# Patient Record
Sex: FEMALE | Race: WHITE | NOT HISPANIC OR LATINO | Employment: OTHER | URBAN - METROPOLITAN AREA
[De-identification: names, ages, dates, MRNs, and addresses within clinical notes are randomized per-mention and may not be internally consistent; named-entity substitution may affect disease eponyms.]

---

## 2017-01-01 ENCOUNTER — PATIENT MESSAGE (OUTPATIENT)
Dept: RHEUMATOLOGY | Facility: CLINIC | Age: 52
End: 2017-01-01

## 2017-01-01 DIAGNOSIS — M06.09 RHEUMATOID ARTHRITIS OF MULTIPLE SITES WITH NEGATIVE RHEUMATOID FACTOR: ICD-10-CM

## 2017-01-02 DIAGNOSIS — M06.00 SERONEGATIVE RHEUMATOID ARTHRITIS: ICD-10-CM

## 2017-01-02 DIAGNOSIS — M54.32 SCIATICA OF LEFT SIDE: ICD-10-CM

## 2017-01-02 DIAGNOSIS — M79.7 FIBROMYALGIA: Chronic | ICD-10-CM

## 2017-01-02 DIAGNOSIS — R51.9 GENERALIZED HEADACHES: ICD-10-CM

## 2017-01-02 RX ORDER — HYDROXYCHLOROQUINE SULFATE 200 MG/1
TABLET, FILM COATED ORAL
Qty: 60 TABLET | Refills: 0 | Status: SHIPPED | OUTPATIENT
Start: 2017-01-02 | End: 2017-02-13 | Stop reason: SDUPTHER

## 2017-01-02 RX ORDER — METHOCARBAMOL 750 MG/1
TABLET, FILM COATED ORAL
Qty: 60 TABLET | Refills: 0 | Status: SHIPPED | OUTPATIENT
Start: 2017-01-02 | End: 2017-01-09 | Stop reason: SDUPTHER

## 2017-01-02 RX ORDER — MELOXICAM 7.5 MG/1
TABLET ORAL
Qty: 180 TABLET | Refills: 0 | Status: SHIPPED | OUTPATIENT
Start: 2017-01-02 | End: 2017-03-22 | Stop reason: SDUPTHER

## 2017-01-03 DIAGNOSIS — M79.7 FIBROMYALGIA: Chronic | ICD-10-CM

## 2017-01-03 DIAGNOSIS — M06.00 SERONEGATIVE RHEUMATOID ARTHRITIS: ICD-10-CM

## 2017-01-03 DIAGNOSIS — M54.32 SCIATICA OF LEFT SIDE: ICD-10-CM

## 2017-01-03 DIAGNOSIS — R51.9 GENERALIZED HEADACHES: ICD-10-CM

## 2017-01-03 RX ORDER — HYDROCODONE BITARTRATE AND ACETAMINOPHEN 5; 325 MG/1; MG/1
TABLET ORAL
Qty: 105 TABLET | Refills: 0 | OUTPATIENT
Start: 2017-01-03

## 2017-01-03 RX ORDER — HYDROCODONE BITARTRATE AND ACETAMINOPHEN 5; 325 MG/1; MG/1
TABLET ORAL
Qty: 105 TABLET | Refills: 0 | Status: SHIPPED | OUTPATIENT
Start: 2017-01-16 | End: 2017-02-15 | Stop reason: SDUPTHER

## 2017-01-03 RX ORDER — NORTRIPTYLINE HYDROCHLORIDE 75 MG/1
CAPSULE ORAL
Qty: 60 CAPSULE | Refills: 0 | Status: SHIPPED | OUTPATIENT
Start: 2017-01-03 | End: 2017-01-29 | Stop reason: SDUPTHER

## 2017-01-05 ENCOUNTER — TELEPHONE (OUTPATIENT)
Dept: OPHTHALMOLOGY | Facility: CLINIC | Age: 52
End: 2017-01-05

## 2017-01-05 DIAGNOSIS — Z79.899 LONG-TERM USE OF PLAQUENIL: Primary | ICD-10-CM

## 2017-01-05 NOTE — TELEPHONE ENCOUNTER
Spoke with pt who stated she sort of forgot appt today something came up can come in on Monday for test and exam.  R/S HVF/OCT and exam on 1/9/17 @ 10:00 and 10: 30.

## 2017-01-07 ENCOUNTER — PATIENT MESSAGE (OUTPATIENT)
Dept: RHEUMATOLOGY | Facility: CLINIC | Age: 52
End: 2017-01-07

## 2017-01-09 ENCOUNTER — PATIENT MESSAGE (OUTPATIENT)
Dept: RHEUMATOLOGY | Facility: CLINIC | Age: 52
End: 2017-01-09

## 2017-01-09 ENCOUNTER — CLINICAL SUPPORT (OUTPATIENT)
Dept: OPHTHALMOLOGY | Facility: CLINIC | Age: 52
End: 2017-01-09
Payer: MEDICAID

## 2017-01-09 ENCOUNTER — INITIAL CONSULT (OUTPATIENT)
Dept: OPTOMETRY | Facility: CLINIC | Age: 52
End: 2017-01-09
Payer: MEDICAID

## 2017-01-09 DIAGNOSIS — Z79.899 LONG-TERM USE OF PLAQUENIL: Primary | ICD-10-CM

## 2017-01-09 DIAGNOSIS — M06.00 SERONEGATIVE RHEUMATOID ARTHRITIS: ICD-10-CM

## 2017-01-09 DIAGNOSIS — Z79.899 HIGH RISK MEDICATION USE: Chronic | ICD-10-CM

## 2017-01-09 DIAGNOSIS — H52.203 ASTIGMATISM, BILATERAL: ICD-10-CM

## 2017-01-09 DIAGNOSIS — H52.4 PRESBYOPIA: ICD-10-CM

## 2017-01-09 DIAGNOSIS — D51.8 OTHER VITAMIN B12 DEFICIENCY ANEMIA: Primary | ICD-10-CM

## 2017-01-09 PROCEDURE — 92004 COMPRE OPH EXAM NEW PT 1/>: CPT | Mod: S$PBB,,, | Performed by: OPTOMETRIST

## 2017-01-09 PROCEDURE — 99999 PR PBB SHADOW E&M-EST. PATIENT-LVL II: CPT | Mod: PBBFAC,,, | Performed by: OPTOMETRIST

## 2017-01-09 PROCEDURE — 92134 CPTRZ OPH DX IMG PST SGM RTA: CPT | Mod: PBBFAC | Performed by: OPTOMETRIST

## 2017-01-09 PROCEDURE — 92083 EXTENDED VISUAL FIELD XM: CPT | Mod: PBBFAC | Performed by: OPTOMETRIST

## 2017-01-09 PROCEDURE — 92015 DETERMINE REFRACTIVE STATE: CPT | Mod: ,,, | Performed by: OPTOMETRIST

## 2017-01-09 PROCEDURE — 99212 OFFICE O/P EST SF 10 MIN: CPT | Mod: PBBFAC | Performed by: OPTOMETRIST

## 2017-01-09 NOTE — PROGRESS NOTES
Rel/fix/coop. Good -[facility]  Assessment /Plan     For exam results, see Encounter Report.    There are no diagnoses linked to this encounter.

## 2017-01-09 NOTE — LETTER
January 13, 2017      Andi Dhillon MD  [address]  [facility] [address]           [facility]  [address]  [facility] [address]  Phone: [phone]  Fax: [phone]          Patient: Angela Carlson   MR Number: [id number]   YOB: 1965   Date of Visit: 1/9/2017       Dear Dr. Andi Dhillon:    Thank you for referring Angela Carlson to me for evaluation. Attached you will find relevant portions of my assessment and plan of care.    If you have questions, please do not hesitate to call me. I look forward to following Angela Carlson along with you.    Sincerely,    Dianna Valdivia, OD    Enclosure  CC:  No Recipients    If you would like to receive this communication electronically, please contact [email or web address] or [phone] to request more information on [employer] access.    For providers and/or their staff who would like to refer a patient to [facility], please contact us through our one-stop-shop provider referral line, [facility], at [phone].    If you feel you have received this communication in error or would no longer like to receive these types of communications, please e-mail [email or web address]

## 2017-01-10 RX ORDER — CYANOCOBALAMIN 1000 UG/ML
INJECTION, SOLUTION INTRAMUSCULAR; SUBCUTANEOUS
Qty: 12 ML | Refills: 0 | Status: SHIPPED | OUTPATIENT
Start: 2017-01-10 | End: 2017-03-17 | Stop reason: SDUPTHER

## 2017-01-13 NOTE — PROGRESS NOTES
HPI     Patient's age: 51 y.o.    Approximate date of last eye examination:   3 mos ago  Name of last eye doctor seen: [facility]      Pt states that medical doctor sent her here to get eyes checked due to   medication that she' on. Also got glasses about 3 mos ago have to hold   head up to read.      Wears glasses? yes     Wears CLs?:  no            Headaches?  no  Eye pain/discomfort?  no                                                                                     Flashes?  no  Floaters?  See little silver stars in vision.  Diplopia/Double vision?  Yes,  3 to 4rtimes a day    Patient's Ocular History:         Any eye surgeries? no         Family history of eye disease?  no    Significant patient medical history:         1. Diabetes?  no       If yes, IDDM or NIDDM? no   2. HBP?  no              3. Other (describe):  Rhematoid Arthritis, Fibromyalgia     ! OTC eyedrops currently using:  none   ! Prescription eye meds currently using:  none           Last edited by Maggie Griffin MA on 1/9/2017 11:45 AM.         Assessment /Plan     For exam results, see Encounter Report.  Seronegative rheumatoid arthritis  High risk medication use  Long-term use of Plaquenil  -     Posterior Segment OCT Retina-WNL OU  -     Davis Visual Field - OU - Extended - WNLOU      Presbyopia  Astigmatism, bilateral   Rx specs    RTC 1 year, DFE, OCT, HVF 10-2

## 2017-01-19 RX ORDER — HYDROXYZINE HYDROCHLORIDE 50 MG/1
TABLET, FILM COATED ORAL
Qty: 60 TABLET | Refills: 0 | Status: SHIPPED | OUTPATIENT
Start: 2017-01-19 | End: 2017-02-16 | Stop reason: SDUPTHER

## 2017-01-27 ENCOUNTER — PATIENT MESSAGE (OUTPATIENT)
Dept: RHEUMATOLOGY | Facility: CLINIC | Age: 52
End: 2017-01-27

## 2017-01-27 ENCOUNTER — PATIENT MESSAGE (OUTPATIENT)
Dept: NEUROLOGY | Facility: CLINIC | Age: 52
End: 2017-01-27

## 2017-01-30 RX ORDER — NORTRIPTYLINE HYDROCHLORIDE 75 MG/1
CAPSULE ORAL
Qty: 60 CAPSULE | Refills: 3 | Status: SHIPPED | OUTPATIENT
Start: 2017-01-30 | End: 2017-05-02 | Stop reason: SDUPTHER

## 2017-01-31 ENCOUNTER — PATIENT MESSAGE (OUTPATIENT)
Dept: NEUROLOGY | Facility: CLINIC | Age: 52
End: 2017-01-31

## 2017-01-31 DIAGNOSIS — M79.7 FIBROMYALGIA: Primary | Chronic | ICD-10-CM

## 2017-01-31 RX ORDER — METHOCARBAMOL 750 MG/1
TABLET, FILM COATED ORAL
Qty: 60 TABLET | Refills: 2 | Status: SHIPPED | OUTPATIENT
Start: 2017-01-31 | End: 2017-04-21 | Stop reason: SDUPTHER

## 2017-02-12 ENCOUNTER — PATIENT MESSAGE (OUTPATIENT)
Dept: RHEUMATOLOGY | Facility: CLINIC | Age: 52
End: 2017-02-12

## 2017-02-12 ENCOUNTER — PATIENT MESSAGE (OUTPATIENT)
Dept: NEUROLOGY | Facility: CLINIC | Age: 52
End: 2017-02-12

## 2017-02-13 DIAGNOSIS — M06.09 RHEUMATOID ARTHRITIS OF MULTIPLE SITES WITH NEGATIVE RHEUMATOID FACTOR: ICD-10-CM

## 2017-02-13 RX ORDER — PROMETHAZINE HYDROCHLORIDE 25 MG/1
TABLET ORAL
Qty: 15 TABLET | Refills: 0 | Status: SHIPPED | OUTPATIENT
Start: 2017-02-13 | End: 2017-05-02 | Stop reason: SDUPTHER

## 2017-02-13 RX ORDER — HYDROXYCHLOROQUINE SULFATE 200 MG/1
TABLET, FILM COATED ORAL
Qty: 60 TABLET | Refills: 2 | Status: SHIPPED | OUTPATIENT
Start: 2017-02-13 | End: 2017-06-17 | Stop reason: SDUPTHER

## 2017-02-15 DIAGNOSIS — M06.00 SERONEGATIVE RHEUMATOID ARTHRITIS: ICD-10-CM

## 2017-02-15 DIAGNOSIS — R51.9 GENERALIZED HEADACHES: ICD-10-CM

## 2017-02-15 DIAGNOSIS — M79.7 FIBROMYALGIA: Chronic | ICD-10-CM

## 2017-02-15 DIAGNOSIS — M54.32 SCIATICA OF LEFT SIDE: ICD-10-CM

## 2017-02-15 RX ORDER — HYDROCODONE BITARTRATE AND ACETAMINOPHEN 5; 325 MG/1; MG/1
TABLET ORAL
Qty: 105 TABLET | Refills: 0 | Status: SHIPPED | OUTPATIENT
Start: 2017-02-15 | End: 2017-03-06 | Stop reason: SDUPTHER

## 2017-02-16 RX ORDER — HYDROXYZINE HYDROCHLORIDE 50 MG/1
TABLET, FILM COATED ORAL
Qty: 60 TABLET | Refills: 0 | Status: SHIPPED | OUTPATIENT
Start: 2017-02-16 | End: 2017-03-15 | Stop reason: SDUPTHER

## 2017-03-06 ENCOUNTER — PATIENT MESSAGE (OUTPATIENT)
Dept: RHEUMATOLOGY | Facility: CLINIC | Age: 52
End: 2017-03-06

## 2017-03-06 DIAGNOSIS — M06.00 SERONEGATIVE RHEUMATOID ARTHRITIS: ICD-10-CM

## 2017-03-06 DIAGNOSIS — M54.32 SCIATICA OF LEFT SIDE: ICD-10-CM

## 2017-03-06 DIAGNOSIS — M79.7 FIBROMYALGIA: Chronic | ICD-10-CM

## 2017-03-06 DIAGNOSIS — R51.9 GENERALIZED HEADACHES: ICD-10-CM

## 2017-03-06 RX ORDER — HYDROCODONE BITARTRATE AND ACETAMINOPHEN 5; 325 MG/1; MG/1
TABLET ORAL
Qty: 105 TABLET | Refills: 0 | Status: SHIPPED | OUTPATIENT
Start: 2017-03-13 | End: 2017-04-10 | Stop reason: SDUPTHER

## 2017-03-16 RX ORDER — HYDROXYZINE HYDROCHLORIDE 50 MG/1
TABLET, FILM COATED ORAL
Qty: 60 TABLET | Refills: 0 | Status: SHIPPED | OUTPATIENT
Start: 2017-03-16 | End: 2017-04-13 | Stop reason: SDUPTHER

## 2017-03-17 ENCOUNTER — TELEPHONE (OUTPATIENT)
Dept: PHARMACY | Facility: CLINIC | Age: 52
End: 2017-03-17

## 2017-03-17 ENCOUNTER — LAB VISIT (OUTPATIENT)
Dept: LAB | Facility: HOSPITAL | Age: 52
End: 2017-03-17
Attending: INTERNAL MEDICINE
Payer: MEDICAID

## 2017-03-17 ENCOUNTER — OFFICE VISIT (OUTPATIENT)
Dept: RHEUMATOLOGY | Facility: CLINIC | Age: 52
End: 2017-03-17
Payer: MEDICAID

## 2017-03-17 VITALS
HEART RATE: 104 BPM | WEIGHT: 190.69 LBS | DIASTOLIC BLOOD PRESSURE: 98 MMHG | BODY MASS INDEX: 27.3 KG/M2 | SYSTOLIC BLOOD PRESSURE: 159 MMHG | HEIGHT: 70 IN

## 2017-03-17 DIAGNOSIS — M06.00 SERONEGATIVE RHEUMATOID ARTHRITIS: ICD-10-CM

## 2017-03-17 DIAGNOSIS — M06.09 RHEUMATOID ARTHRITIS OF MULTIPLE SITES WITH NEGATIVE RHEUMATOID FACTOR: Primary | ICD-10-CM

## 2017-03-17 DIAGNOSIS — Z79.899 HIGH RISK MEDICATION USE: ICD-10-CM

## 2017-03-17 LAB
ALBUMIN SERPL BCP-MCNC: 4.1 G/DL
ALP SERPL-CCNC: 103 U/L
ALT SERPL W/O P-5'-P-CCNC: 16 U/L
ANION GAP SERPL CALC-SCNC: 8 MMOL/L
AST SERPL-CCNC: 15 U/L
BASOPHILS # BLD AUTO: 0 K/UL
BASOPHILS NFR BLD: 0 %
BILIRUB SERPL-MCNC: 0.5 MG/DL
BUN SERPL-MCNC: 15 MG/DL
CALCIUM SERPL-MCNC: 10.2 MG/DL
CHLORIDE SERPL-SCNC: 104 MMOL/L
CO2 SERPL-SCNC: 29 MMOL/L
CREAT SERPL-MCNC: 0.9 MG/DL
CRP SERPL-MCNC: 2.5 MG/L
DIFFERENTIAL METHOD: NORMAL
EOSINOPHIL # BLD AUTO: 0 K/UL
EOSINOPHIL NFR BLD: 0 %
ERYTHROCYTE [DISTWIDTH] IN BLOOD BY AUTOMATED COUNT: 13.4 %
ERYTHROCYTE [SEDIMENTATION RATE] IN BLOOD BY WESTERGREN METHOD: 5 MM/HR
EST. GFR  (AFRICAN AMERICAN): >60 ML/MIN/1.73 M^2
EST. GFR  (NON AFRICAN AMERICAN): >60 ML/MIN/1.73 M^2
GLUCOSE SERPL-MCNC: 88 MG/DL
HCT VFR BLD AUTO: 44.2 %
HGB BLD-MCNC: 14.5 G/DL
LYMPHOCYTES # BLD AUTO: 1.2 K/UL
LYMPHOCYTES NFR BLD: 30.1 %
MCH RBC QN AUTO: 27.1 PG
MCHC RBC AUTO-ENTMCNC: 32.8 %
MCV RBC AUTO: 83 FL
MONOCYTES # BLD AUTO: 0.3 K/UL
MONOCYTES NFR BLD: 6.4 %
NEUTROPHILS # BLD AUTO: 2.6 K/UL
NEUTROPHILS NFR BLD: 63.3 %
PLATELET # BLD AUTO: 222 K/UL
PMV BLD AUTO: 9.9 FL
POTASSIUM SERPL-SCNC: 3.8 MMOL/L
PROT SERPL-MCNC: 7.6 G/DL
RBC # BLD AUTO: 5.36 M/UL
SODIUM SERPL-SCNC: 141 MMOL/L
WBC # BLD AUTO: 4.05 K/UL

## 2017-03-17 PROCEDURE — 99213 OFFICE O/P EST LOW 20 MIN: CPT | Mod: PBBFAC | Performed by: INTERNAL MEDICINE

## 2017-03-17 PROCEDURE — 99214 OFFICE O/P EST MOD 30 MIN: CPT | Mod: S$PBB,,, | Performed by: INTERNAL MEDICINE

## 2017-03-17 PROCEDURE — 99999 PR PBB SHADOW E&M-EST. PATIENT-LVL III: CPT | Mod: PBBFAC,,, | Performed by: INTERNAL MEDICINE

## 2017-03-17 ASSESSMENT — ROUTINE ASSESSMENT OF PATIENT INDEX DATA (RAPID3)
AM STIFFNESS SCORE: 1, YES
PSYCHOLOGICAL DISTRESS SCORE: 4.4
MDHAQ FUNCTION SCORE: .8
FATIGUE SCORE: 8
PAIN SCORE: 7
TOTAL RAPID3 SCORE: 5.55
PATIENT GLOBAL ASSESSMENT SCORE: 7
WHEN YOU AWAKENED IN THE MORNING OVER THE LAST WEEK, PLEASE INDICATE THE AMOUNT OF TIME IT TAKES UNTIL YOU ARE AS LIMBER AS YOU WILL BE FOR THE DAY: 45 MINUTES

## 2017-03-17 ASSESSMENT — DISEASE ACTIVITY SCORE (DAS28)
CRP_MG_PER_LITER: 2.5
TOTAL_SCORE_CRP: 4.7
ESR_MM_PER_HR: 5
TENDER_JOINTS_COUNT: 9
GLOBAL_HEALTH_SCORE: 70
TOTAL_SCORE_ESR: 4.41
SWOLLEN_JOINTS_COUNT: 5

## 2017-03-17 NOTE — PROGRESS NOTES
"Subjective:       Patient ID: Angela Carlson is a 51 y.o. female.    Chief Complaint: Disease Management            Associated symptoms include fatigue and headaches. Pertinent negatives include no fever.          F/u seroneg RA, FMS  RA since ~2004; hx MTX and HCQ 2004-5; Enbrel ~2010, stopped when developed neutropenia; sx remitted 2013-15, then recurrent synovitis; HCQ 2015-current     FMS: Dx before 2013; robaxin and doxepin; nortriptyline added 2014; hydrocodone since 2014 after failed tramadol     Current meds:   bid ; went to eye clinic Dr Valdivia Jan 2017  Meloxicam 7.5 mg  Duloxetine 60  Nortriptyline 75  Hydrocodone : has been using 105 over 90 days  Robaxin, helps her; says less hand and wrist pain ; usually takes afternoon or night    Morning stiffness for about 45 minutes  R hand hurts; cannot remove ring; shakes it to feel better  L hand not bad  R hand throbs  Stays busy ; always cleaning or gardening  Family activities with sister's children    Review of Systems   Constitutional: Positive for fatigue and unexpected weight change. Negative for fever.   HENT: Negative for mouth sores.    Respiratory: Negative for cough and shortness of breath.    Cardiovascular: Negative for chest pain.   Gastrointestinal: Negative for abdominal pain and diarrhea.   Skin: Negative for rash.   Neurological: Positive for headaches.   Psychiatric/Behavioral: Negative for sleep disturbance.         Objective:     BP (!) 159/98  Pulse 104  Ht 5' 9.5" (1.765 m)  Wt 86.5 kg (190 lb 11.2 oz)  BMI 27.76 kg/m2     Physical Exam   Constitutional: She is well-developed, well-nourished, and in no distress.   HENT:   Mouth/Throat: Oropharynx is clear and moist.   Eyes: Conjunctivae are normal.   Cardiovascular: Normal rate, regular rhythm and normal heart sounds.        Right Side Rheumatological Exam     The patient is tender to palpation of the 1st MCP, 2nd PIP, 2nd MCP, 3rd PIP, 3rd MCP, 4th PIP and 5th PIP    She " has swelling of the 1st MCP, 2nd MCP and 3rd MCP    Left Side Rheumatological Exam     The patient is tender to palpation of the wrist, 2nd MCP and 3rd MCP.    She has swelling of the wrist, 2nd MCP and 3rd MCP      Lymphadenopathy:     She has no cervical adenopathy.   Neurological: She is alert.   Skin: No rash noted.         Today:  Nl CBC, ESR 5, CMP nl, CRP 2.5    Assessment:       1. Rheumatoid arthritis of multiple sites with negative rheumatoid factor    2. High risk medication use        Seronegative RA with increased swollen and tender joints  While on hydroxychloroquine; previously failed mtx and previously improved on Enbrel    DAS28 is 4.41 (YAN-crp 4.7)    Chronic hydrocodone    Plan:     1. Schedule Quantiferon Gold next week  2. Start Enbrel after Quantiferon gold TB test; Rx sent to [facility]  3. Cont HCQ until starts Enbrel  4. RTC 3 mo with lab to assess response to Enbrel

## 2017-03-17 NOTE — MR AVS SNAPSHOT
[facility]  [address]  [place] [address]  Phone: [phone]  Fax: [phone]                  Angela Carlson   3/17/2017 10:30 AM   Office Visit    Description:  Female : 1965   Provider:  Andi Dhillon MD   Department:  [facility]           Reason for Visit     Disease Management           Diagnoses this Visit        Comments    Rheumatoid arthritis of multiple sites with negative rheumatoid factor    -  Primary            To Do List           Future Appointments        Provider Department Dept Phone    2017 3:30 PM Chava Montoya MD [facility] [phone]      Goals (5 Years of Data)     None       These Medications        Disp Refills Start End    etanercept (ENBREL SURECLICK) 50 mg/mL (0.98 mL) PnIj 4 Syringe 11 3/17/2017 3/17/2018    Inject 50 mg into the skin once a week. - Subcutaneous    Pharmacy: [facility] [place] [address]  Ph #: [phone]         [facility]     [facility] Nurse Care Line -  Assistance  Registered nurses in the [facility] provide clinical advisement, health education, appointment booking, and other advisory services.  Call for this free service at [phone].             Medications           Message regarding Medications     Verify the changes and/or additions to your medication regime listed below are the same as discussed with your clinician today.  If any of these changes or additions are incorrect, please notify your healthcare provider.        START taking these NEW medications        Refills    etanercept (ENBREL SURECLICK) 50 mg/mL (0.98 mL) PnIj 11    Sig: Inject 50 mg into the skin once a week.    Class: Normal    Route: Subcutaneous           Verify that the below list of medications is an accurate representation of the medications you are currently taking.  If none reported, the list may be blank. If  "incorrect, please contact your healthcare provider. Carry this list with you in case of emergency.           Current Medications     atorvastatin (LIPITOR) 20 MG tablet TK 1 T PO QD    cyanocobalamin 1,000 mcg/mL injection INJECT 1 ML AS DIRECTED 3 TIMES A WEEK    duloxetine (CYMBALTA) 60 MG capsule TAKE 1 CAPSULE BY MOUTH EVERY DAY    hydrocodone-acetaminophen 5-325mg (NORCO) 5-325 mg per tablet Alternate 3 or 4 every other day as needed for pain    hydroxychloroquine (PLAQUENIL) 200 mg tablet TAKE 1 TABLET BY MOUTH TWICE DAILY    hydrOXYzine (ATARAX) 50 MG tablet TAKE 1 TABLET BY MOUTH TWICE DAILY AS NEEDED FOR ITCHING    levothyroxine (SYNTHROID) 150 MCG tablet Take 150 mcg by mouth once daily.    meloxicam (MOBIC) 7.5 MG tablet TAKE 1 TO 2 TABLETS(7.5 TO 15 MG) BY MOUTH DAILY AS NEEDED FOR PAIN OR ARTHRITIS    methocarbamol (ROBAXIN) 750 MG Tab TAKE 1 TABLET BY MOUTH TWICE DAILY AS NEEDED    metoprolol tartrate (LOPRESSOR) 50 MG tablet Take 50 mg by mouth every evening.     nortriptyline (PAMELOR) 75 MG Cap TAKE 1 TO 2 CAPSULES BY MOUTH EVERY NIGHT AT BEDTIME AS NEEDED    promethazine (PHENERGAN) 25 MG tablet TAKE 1 TABLET BY MOUTH EVERY 6 HOURS AS NEEDED FOR NAUSEA OR VOMITING    etanercept (ENBREL SURECLICK) 50 mg/mL (0.98 mL) PnIj Inject 50 mg into the skin once a week.           Clinical Reference Information           Your Vitals Were     BP Pulse Height Weight BMI    159/98 104 5' 9.5" (1.765 m) 86.5 kg (190 lb 11.2 oz) 27.76 kg/m2      Blood Pressure          Most Recent Value    BP  (!)  159/98      Allergies as of 3/17/2017     No Known Allergies      Immunizations Administered on Date of Encounter - 3/17/2017     None      Language Assistance Services     ATTENTION: Language assistance services are available, free of charge. Please call [phone].      ATENCIÓN: Si christal salgado, tiene a fontaine disposición servicios gratuitos de asistencia lingüística. Llame al [phone].     CHÚ Ý: N?u b?n nói " Ti?ng Vi?t, có các d?ch v? h? tr? ngôn ng? mi?n phí dành cho b?n. G?i s? [phone].         [facility] complies with applicable Federal civil rights laws and does not discriminate on the basis of race, color, national origin, age, disability, or sex.

## 2017-03-21 ENCOUNTER — LAB VISIT (OUTPATIENT)
Dept: LAB | Facility: HOSPITAL | Age: 52
End: 2017-03-21
Attending: INTERNAL MEDICINE
Payer: MEDICAID

## 2017-03-21 DIAGNOSIS — Z79.899 HIGH RISK MEDICATION USE: ICD-10-CM

## 2017-03-21 DIAGNOSIS — M06.09 RHEUMATOID ARTHRITIS OF MULTIPLE SITES WITH NEGATIVE RHEUMATOID FACTOR: ICD-10-CM

## 2017-03-21 PROCEDURE — 36415 COLL VENOUS BLD VENIPUNCTURE: CPT

## 2017-03-21 PROCEDURE — 86480 TB TEST CELL IMMUN MEASURE: CPT

## 2017-03-22 DIAGNOSIS — M06.00 SERONEGATIVE RHEUMATOID ARTHRITIS: ICD-10-CM

## 2017-03-22 RX ORDER — MELOXICAM 7.5 MG/1
TABLET ORAL
Qty: 180 TABLET | Refills: 0 | Status: SHIPPED | OUTPATIENT
Start: 2017-03-22 | End: 2017-06-17 | Stop reason: SDUPTHER

## 2017-03-23 LAB
MITOGEN NIL: >10 IU/ML
NIL: 0.09 IU/ML
TB ANTIGEN NIL: -0.02 IU/ML
TB ANTIGEN: 0.07 IU/ML
TB GOLD: NEGATIVE

## 2017-03-27 NOTE — TELEPHONE ENCOUNTER
Dr Dhillon,    Specialty Pharmacy received rx for Enbrel which was submitted for [place] an was denied by her insurance, however, this decision was overturned on the basis of appeal.    PA# [id number]  Approval dates: 3/27/17-9/27/17    Our staff will reach out to the patient to offer consultation/injection training and shipment/ of the medication.      Carlos A [place], Mendoza, BCPS [facility]  [phone]

## 2017-03-28 ENCOUNTER — TELEPHONE (OUTPATIENT)
Dept: PHARMACY | Facility: CLINIC | Age: 52
End: 2017-03-28

## 2017-04-03 ENCOUNTER — TELEPHONE (OUTPATIENT)
Dept: PHARMACY | Facility: CLINIC | Age: 52
End: 2017-04-03

## 2017-04-03 NOTE — TELEPHONE ENCOUNTER
patient contacted to offer consult and setup shipment. She verified she has not started any new medication. She has not developed any new drug allergies or medical conditions. She schedule her ship date for 4-4-17 for a 4-5-17 delivery. She verified her address on file is correct and would like her medication to go there. She is new to pharmacy but existing to drug and has declined consultation with a clinical pharmacist. She verified understanding of the refill process and has no additional questions at the time_ 4-3-17.

## 2017-04-10 ENCOUNTER — PATIENT MESSAGE (OUTPATIENT)
Dept: RHEUMATOLOGY | Facility: CLINIC | Age: 52
End: 2017-04-10

## 2017-04-10 DIAGNOSIS — D51.8 OTHER VITAMIN B12 DEFICIENCY ANEMIA: ICD-10-CM

## 2017-04-10 DIAGNOSIS — M79.7 FIBROMYALGIA: Chronic | ICD-10-CM

## 2017-04-10 DIAGNOSIS — M54.32 SCIATICA OF LEFT SIDE: ICD-10-CM

## 2017-04-10 DIAGNOSIS — M06.00 SERONEGATIVE RHEUMATOID ARTHRITIS: ICD-10-CM

## 2017-04-10 DIAGNOSIS — R51.9 GENERALIZED HEADACHES: ICD-10-CM

## 2017-04-10 RX ORDER — HYDROCODONE BITARTRATE AND ACETAMINOPHEN 5; 325 MG/1; MG/1
TABLET ORAL
Qty: 105 TABLET | Refills: 0 | Status: SHIPPED | OUTPATIENT
Start: 2017-04-10 | End: 2017-05-15 | Stop reason: SDUPTHER

## 2017-04-11 RX ORDER — CYANOCOBALAMIN 1000 UG/ML
INJECTION, SOLUTION INTRAMUSCULAR; SUBCUTANEOUS
Qty: 12 ML | Refills: 0 | Status: SHIPPED | OUTPATIENT
Start: 2017-04-11 | End: 2017-05-02 | Stop reason: SDUPTHER

## 2017-04-13 RX ORDER — HYDROXYZINE HYDROCHLORIDE 50 MG/1
TABLET, FILM COATED ORAL
Qty: 60 TABLET | Refills: 0 | Status: SHIPPED | OUTPATIENT
Start: 2017-04-13 | End: 2017-05-02 | Stop reason: SDUPTHER

## 2017-04-21 DIAGNOSIS — M79.7 FIBROMYALGIA: Chronic | ICD-10-CM

## 2017-04-21 RX ORDER — DULOXETIN HYDROCHLORIDE 60 MG/1
CAPSULE, DELAYED RELEASE ORAL
Qty: 30 CAPSULE | Refills: 2 | Status: SHIPPED | OUTPATIENT
Start: 2017-04-21 | End: 2017-07-19 | Stop reason: SDUPTHER

## 2017-04-21 RX ORDER — METHOCARBAMOL 750 MG/1
TABLET, FILM COATED ORAL
Qty: 60 TABLET | Refills: 2 | Status: SHIPPED | OUTPATIENT
Start: 2017-04-21 | End: 2017-07-13 | Stop reason: SDUPTHER

## 2017-04-25 ENCOUNTER — TELEPHONE (OUTPATIENT)
Dept: PHARMACY | Facility: CLINIC | Age: 52
End: 2017-04-25

## 2017-05-02 ENCOUNTER — OFFICE VISIT (OUTPATIENT)
Dept: NEUROLOGY | Facility: CLINIC | Age: 52
End: 2017-05-02
Payer: MEDICAID

## 2017-05-02 VITALS
HEART RATE: 100 BPM | HEIGHT: 69 IN | WEIGHT: 189.81 LBS | BODY MASS INDEX: 28.11 KG/M2 | RESPIRATION RATE: 16 BRPM | DIASTOLIC BLOOD PRESSURE: 92 MMHG | SYSTOLIC BLOOD PRESSURE: 124 MMHG

## 2017-05-02 DIAGNOSIS — G47.33 OSA (OBSTRUCTIVE SLEEP APNEA): ICD-10-CM

## 2017-05-02 DIAGNOSIS — M79.7 FIBROMYALGIA: ICD-10-CM

## 2017-05-02 DIAGNOSIS — G43.109 MIGRAINE WITH AURA AND WITHOUT STATUS MIGRAINOSUS, NOT INTRACTABLE: Primary | ICD-10-CM

## 2017-05-02 PROCEDURE — 99999 PR PBB SHADOW E&M-EST. PATIENT-LVL III: CPT | Mod: PBBFAC,,, | Performed by: PSYCHIATRY & NEUROLOGY

## 2017-05-02 PROCEDURE — 99214 OFFICE O/P EST MOD 30 MIN: CPT | Mod: S$PBB,,, | Performed by: PSYCHIATRY & NEUROLOGY

## 2017-05-02 PROCEDURE — 99213 OFFICE O/P EST LOW 20 MIN: CPT | Mod: PBBFAC | Performed by: PSYCHIATRY & NEUROLOGY

## 2017-05-02 RX ORDER — HYDROXYZINE HYDROCHLORIDE 50 MG/1
TABLET, FILM COATED ORAL
Qty: 60 TABLET | Refills: 11 | Status: SHIPPED | OUTPATIENT
Start: 2017-05-02 | End: 2018-05-08 | Stop reason: SDUPTHER

## 2017-05-02 RX ORDER — PROMETHAZINE HYDROCHLORIDE 25 MG/1
TABLET ORAL
Qty: 15 TABLET | Refills: 5 | Status: SHIPPED | OUTPATIENT
Start: 2017-05-02 | End: 2017-07-06 | Stop reason: SDUPTHER

## 2017-05-02 RX ORDER — NORTRIPTYLINE HYDROCHLORIDE 75 MG/1
CAPSULE ORAL
Qty: 60 CAPSULE | Refills: 11 | Status: SHIPPED | OUTPATIENT
Start: 2017-05-02 | End: 2018-05-14 | Stop reason: SDUPTHER

## 2017-05-02 NOTE — PROGRESS NOTES
HPI: Angela Carlson is a 51 y.o. female with migraine with aura and with  cervicogenic trigger possibly, although MRI C spine with only minor changes. Some complicated migraine with some alteration of consciousness at times-resolved. Anxiety and depression. She has  TREY.   Symptoms of lumbar radicular pain down the left leg with MRI showing disc bulge and nerve root displacement at S1 on the left 2015.   She reports great control of her headaches since 2/2017.  She states this is because she does not isolate at home any more and stress is reduced.  Her pain is well controlled with a new bed  She is on Enbrel for RA starting today  Her Lumbar radicular pain is worse in episodes. She treats this with rest.   She is using atrax for anxiety/itching related to anxiety a few days per week.  She uses phenergan once or twice per week to help with nausea side effects of other meds  RA is also managed with hydrocodone via Rheumatology  She is no longer Fioricet    Review of Systems   Constitutional: Negative for fever.   HENT: Negative for nosebleeds.    Eyes: Negative for double vision.   Respiratory: Negative for hemoptysis.    Cardiovascular: Negative for leg swelling.   Gastrointestinal: Negative for abdominal pain, blood in stool and diarrhea.   Genitourinary: Negative for hematuria.   Musculoskeletal: Positive for back pain.   Skin: Negative for rash.   Neurological: Negative for tremors and seizures.   Psychiatric/Behavioral: The patient does not have insomnia.        Exam:     Gen Appearance, well developed/nourished in no apparent distress  CV: 2+ distal pulses with no edema or swelling  Neuro:  MS: Awake, alert, Sustains attention. Recent/remote memory intact, Language is full to spontaneous speech and comprehension.  Fund of Knowledge is full  CN: Optic discs are flat with normal vasculature, PERRL, Extraoccular movements and visual fields are full. Normal facial sensation and strength, Hearing symmetric,  Tongue and Palate are midline and strong. Shoulder Shrug symmetric and strong.  Motor: Normal bulk, tone, no abnormal movements. 5/5 strength bilateral upper/lower extremities with 2+ reflexes  Sensory: Romberg negative and intact to temp, vibration  Cerebellar: Finger-nose, Rapid alternating movements intact  Gait: Normal stance, no ataxia          Assessment/Recommendation: Angela Carlson is a 51 y.o. female with migraine with aura and with  cervicogenic trigger possibly, although MRI C spine with only minor changes. Some complicated migraine with some alteration of consciousness at times-resolved. Anxiety and depression. She has  TREY.   Symptoms of lumbar radicular pain down the left leg with MRI showing disc bulge and nerve root displacement at S1 on the left 2015.   I recommend:     1. Saw spine surgery- conservative measures recommended and she is currently responding to rest for episodic back pain  2. Regarding Fibromyalgia and headache prevention: She is using Pamelor via (higher dose per rheumatology prior) me for prevention of pain. Also using Cymbalta with Rheumatology and has good pain relief without symptoms of serotonin excessive.  Gabapentin caused GI upset and she tried Lyrica and Zonegran prior  3.  Can continue Atarax PRN( don't use atarax with phenergan) for anxiety. Phenergan for nausea associated with headache and med side effects (all currently well controlled) Rheumatology also managing hydrocodone for pain with RA.   4. Continue CPAP for TREY  5. Prior spells resolved/ could have been complicated migraine  6. Continue B12 monthly for prior deficiency      RTC in   1 year

## 2017-05-02 NOTE — MR AVS SNAPSHOT
[facility]  [address] [facility] [address]  Phone: [phone]  Fax: [phone]                  Angela Carlson   2017 3:30 PM   Office Visit    Description:  Female : 1965   Provider:  Chava Montoya MD   Department:  [facility]           Reason for Visit     Migraine           Diagnoses this Visit        Comments    Migraine with aura and without status migrainosus, not intractable    -  Primary     Fibromyalgia         TREY (obstructive sleep apnea)                To Do List           Future Appointments        Provider Department Dept Phone    2017 8:45 AM LAB, SAME DAY [facility] [phone]    2017 9:30 AM Andi Dhillon MD [facility] - Rheumatology [phone]      Goals (5 Years of Data)     None      Follow-Up and Disposition     Return in about 1 year (around 2018).       These Medications        Disp Refills Start End    hydrOXYzine (ATARAX) 50 MG tablet 60 tablet 11 2017     TAKE 1 TABLET BY MOUTH TWICE DAILY AS NEEDED FOR ITCHING    Pharmacy: [facility] [address] [place] [address] - [address] AT [facility] Ph #: [phone]       promethazine (PHENERGAN) 25 MG tablet 15 tablet 5 2017     TAKE 1 TABLET BY MOUTH EVERY 6 HOURS AS NEEDED FOR NAUSEA OR VOMITING    [facility] [id number] [facility] [place] [facility] [address] AT [facility] Ph #: [phone]       nortriptyline (PAMELOR) 75 MG Cap 60 capsule 11 2017     TAKE 2 CAPSULES BY MOUTH EVERY NIGHT AT BEDTIME    Pharmacy: [facility] [address] [facility] [place] - [address] AT [facility] Ph #: [phone]         [facility]     [facility] Nurse Care Line -  Assistance  Unless otherwise directed by your provider, please contact [facility], our nurse care line that is available for  assistance.     Registered nurses in the [facility] provide:  appointment scheduling, clinical advisement, health education, and other advisory services.  Call: [phone] (toll free)               Medications           Message regarding Medications     Verify the changes and/or additions to your medication regime listed below are the same as discussed with your clinician today.  If any of these changes or additions are incorrect, please notify your healthcare provider.        CHANGE how you are taking these medications     Start Taking Instead of    nortriptyline (PAMELOR) 75 MG Cap nortriptyline (PAMELOR) 75 MG Cap    Dosage:  TAKE 2 CAPSULES BY MOUTH EVERY NIGHT AT BEDTIME Dosage:  TAKE 1 TO 2 CAPSULES BY MOUTH EVERY NIGHT AT BEDTIME AS NEEDED    Reason for Change:  Reorder            Verify that the below list of medications is an accurate representation of the medications you are currently taking.  If none reported, the list may be blank. If incorrect, please contact your healthcare provider. Carry this list with you in case of emergency.           Current Medications     atorvastatin (LIPITOR) 20 MG tablet TK 1 T PO QD    cyanocobalamin 1,000 mcg/mL injection INJECT 1 ML AS DIRECTED 3 TIMES A WEEK    duloxetine (CYMBALTA) 60 MG capsule TAKE 1 CAPSULE BY MOUTH EVERY DAY    etanercept (ENBREL SURECLICK) 50 mg/mL (0.98 mL) PnIj Inject 50 mg into the skin once a week.    hydrocodone-acetaminophen 5-325mg (NORCO) 5-325 mg per tablet Alternate 3 or 4 every other day as needed for pain    hydroxychloroquine (PLAQUENIL) 200 mg tablet TAKE 1 TABLET BY MOUTH TWICE DAILY    hydrOXYzine (ATARAX) 50 MG tablet TAKE 1 TABLET BY MOUTH TWICE DAILY AS NEEDED FOR ITCHING    levothyroxine (SYNTHROID) 150 MCG tablet Take 150 mcg by mouth once daily.    meloxicam (MOBIC) 7.5 MG tablet TAKE 1 TO 2 TABLETS(7.5 TO 15 MG) BY MOUTH DAILY AS NEEDED FOR PAIN OR ARTHRITIS    methocarbamol (ROBAXIN) 750 MG Tab TAKE 1 TABLET BY MOUTH TWICE DAILY AS NEEDED    metoprolol tartrate (LOPRESSOR) 50 MG  "tablet Take 50 mg by mouth every evening.     nortriptyline (PAMELOR) 75 MG Cap TAKE 2 CAPSULES BY MOUTH EVERY NIGHT AT BEDTIME    promethazine (PHENERGAN) 25 MG tablet TAKE 1 TABLET BY MOUTH EVERY 6 HOURS AS NEEDED FOR NAUSEA OR VOMITING           Clinical Reference Information           Your Vitals Were     BP Pulse Resp Height Weight BMI    124/92 (BP Location: Right arm, Patient Position: Sitting, BP Method: Manual) 100 16 5' 9" (1.753 m) 86.1 kg (189 lb 13.1 oz) 28.03 kg/m2      Blood Pressure          Most Recent Value    BP  (!)  124/92      Allergies as of 5/2/2017     No Known Allergies      Immunizations Administered on Date of Encounter - 5/2/2017     None      Language Assistance Services     ATTENTION: Language assistance services are available, free of charge. Please call [phone].      ATENCIÓN: Si christal salgado, tiene a fontaine disposición servicios gratuitos de asistencia lingüística. Llame al [phone].     CHÚ Ý: N?u b?n nói Ti?ng Vi?t, có các d?ch v? h? tr? ngôn ng? mi?n phí dành cho b?n. G?i s? [phone].         [facility] complies with applicable Federal civil rights laws and does not discriminate on the basis of race, color, national origin, age, disability, or sex.        "

## 2017-05-15 ENCOUNTER — PATIENT MESSAGE (OUTPATIENT)
Dept: RHEUMATOLOGY | Facility: CLINIC | Age: 52
End: 2017-05-15

## 2017-05-15 DIAGNOSIS — M79.7 FIBROMYALGIA: Chronic | ICD-10-CM

## 2017-05-15 DIAGNOSIS — M06.00 SERONEGATIVE RHEUMATOID ARTHRITIS: ICD-10-CM

## 2017-05-15 DIAGNOSIS — M54.32 SCIATICA OF LEFT SIDE: ICD-10-CM

## 2017-05-15 DIAGNOSIS — R51.9 GENERALIZED HEADACHES: ICD-10-CM

## 2017-05-15 RX ORDER — HYDROCODONE BITARTRATE AND ACETAMINOPHEN 5; 325 MG/1; MG/1
TABLET ORAL
Qty: 105 TABLET | Refills: 0 | Status: SHIPPED | OUTPATIENT
Start: 2017-05-15 | End: 2017-06-05 | Stop reason: SDUPTHER

## 2017-05-15 RX ORDER — HYDROXYZINE HYDROCHLORIDE 50 MG/1
TABLET, FILM COATED ORAL
Qty: 60 TABLET | Refills: 0 | OUTPATIENT
Start: 2017-05-15

## 2017-05-24 ENCOUNTER — TELEPHONE (OUTPATIENT)
Dept: PHARMACY | Facility: CLINIC | Age: 52
End: 2017-05-24

## 2017-05-24 NOTE — TELEPHONE ENCOUNTER
Patient was diagnosed with RA ~ 10 years ago and has been on Enbrel for about 2 months.     Description: The RAPID3 score is a sum of the converted Patient Functional Status Score, the Pain Tolerance score and the Global health score.  Score Value: 14.3  Score Stratification: HIGH Activity  Recommended Follow up: Wed June 21 2017  TTN

## 2017-06-05 ENCOUNTER — PATIENT MESSAGE (OUTPATIENT)
Dept: RHEUMATOLOGY | Facility: CLINIC | Age: 52
End: 2017-06-05

## 2017-06-05 DIAGNOSIS — M06.00 SERONEGATIVE RHEUMATOID ARTHRITIS: ICD-10-CM

## 2017-06-05 DIAGNOSIS — M79.7 FIBROMYALGIA: Chronic | ICD-10-CM

## 2017-06-05 DIAGNOSIS — R51.9 GENERALIZED HEADACHES: ICD-10-CM

## 2017-06-05 DIAGNOSIS — M54.32 SCIATICA OF LEFT SIDE: ICD-10-CM

## 2017-06-05 RX ORDER — HYDROCODONE BITARTRATE AND ACETAMINOPHEN 5; 325 MG/1; MG/1
TABLET ORAL
Qty: 105 TABLET | Refills: 0 | Status: SHIPPED | OUTPATIENT
Start: 2017-06-05 | End: 2017-07-10 | Stop reason: SDUPTHER

## 2017-06-13 DIAGNOSIS — D51.8 OTHER VITAMIN B12 DEFICIENCY ANEMIA: ICD-10-CM

## 2017-06-13 RX ORDER — CYANOCOBALAMIN 1000 UG/ML
INJECTION, SOLUTION INTRAMUSCULAR; SUBCUTANEOUS
Qty: 12 ML | Refills: 0 | Status: SHIPPED | OUTPATIENT
Start: 2017-06-13 | End: 2017-11-01

## 2017-06-17 DIAGNOSIS — M06.00 SERONEGATIVE RHEUMATOID ARTHRITIS: ICD-10-CM

## 2017-06-17 DIAGNOSIS — M06.09 RHEUMATOID ARTHRITIS OF MULTIPLE SITES WITH NEGATIVE RHEUMATOID FACTOR: ICD-10-CM

## 2017-06-19 RX ORDER — HYDROXYCHLOROQUINE SULFATE 200 MG/1
TABLET, FILM COATED ORAL
Qty: 60 TABLET | Refills: 0 | Status: SHIPPED | OUTPATIENT
Start: 2017-06-19 | End: 2017-07-19 | Stop reason: SDUPTHER

## 2017-06-19 RX ORDER — MELOXICAM 7.5 MG/1
TABLET ORAL
Qty: 180 TABLET | Refills: 0 | Status: SHIPPED | OUTPATIENT
Start: 2017-06-19 | End: 2017-09-22

## 2017-06-20 ENCOUNTER — TELEPHONE (OUTPATIENT)
Dept: PHARMACY | Facility: CLINIC | Age: 52
End: 2017-06-20

## 2017-07-06 RX ORDER — PROMETHAZINE HYDROCHLORIDE 25 MG/1
TABLET ORAL
Qty: 15 TABLET | Refills: 0 | Status: SHIPPED | OUTPATIENT
Start: 2017-07-06 | End: 2017-11-13 | Stop reason: SDUPTHER

## 2017-07-09 ENCOUNTER — PATIENT MESSAGE (OUTPATIENT)
Dept: RHEUMATOLOGY | Facility: CLINIC | Age: 52
End: 2017-07-09

## 2017-07-09 DIAGNOSIS — R51.9 GENERALIZED HEADACHES: ICD-10-CM

## 2017-07-09 DIAGNOSIS — M54.32 SCIATICA OF LEFT SIDE: ICD-10-CM

## 2017-07-09 DIAGNOSIS — M06.00 SERONEGATIVE RHEUMATOID ARTHRITIS: ICD-10-CM

## 2017-07-09 DIAGNOSIS — M79.7 FIBROMYALGIA: Chronic | ICD-10-CM

## 2017-07-10 RX ORDER — HYDROCODONE BITARTRATE AND ACETAMINOPHEN 5; 325 MG/1; MG/1
TABLET ORAL
Qty: 105 TABLET | Refills: 0 | Status: SHIPPED | OUTPATIENT
Start: 2017-07-10 | End: 2017-08-10 | Stop reason: SDUPTHER

## 2017-07-13 DIAGNOSIS — M79.7 FIBROMYALGIA: Chronic | ICD-10-CM

## 2017-07-13 DIAGNOSIS — D51.8 OTHER VITAMIN B12 DEFICIENCY ANEMIA: ICD-10-CM

## 2017-07-13 RX ORDER — CYANOCOBALAMIN 1000 UG/ML
INJECTION, SOLUTION INTRAMUSCULAR; SUBCUTANEOUS
Qty: 12 ML | Refills: 0 | Status: SHIPPED | OUTPATIENT
Start: 2017-07-13 | End: 2019-04-02

## 2017-07-13 RX ORDER — METHOCARBAMOL 750 MG/1
750 TABLET, FILM COATED ORAL 2 TIMES DAILY PRN
Qty: 60 TABLET | Refills: 2 | Status: SHIPPED | OUTPATIENT
Start: 2017-07-13 | End: 2017-11-01 | Stop reason: SDUPTHER

## 2017-07-13 RX ORDER — METHOCARBAMOL 750 MG/1
TABLET, FILM COATED ORAL
Qty: 60 TABLET | Refills: 0 | OUTPATIENT
Start: 2017-07-13

## 2017-07-17 ENCOUNTER — TELEPHONE (OUTPATIENT)
Dept: PHARMACY | Facility: CLINIC | Age: 52
End: 2017-07-17

## 2017-07-19 ENCOUNTER — TELEPHONE (OUTPATIENT)
Dept: PHARMACY | Facility: CLINIC | Age: 52
End: 2017-07-19

## 2017-07-19 DIAGNOSIS — M06.09 RHEUMATOID ARTHRITIS OF MULTIPLE SITES WITH NEGATIVE RHEUMATOID FACTOR: ICD-10-CM

## 2017-07-19 DIAGNOSIS — M79.7 FIBROMYALGIA: Chronic | ICD-10-CM

## 2017-07-19 RX ORDER — DULOXETIN HYDROCHLORIDE 60 MG/1
CAPSULE, DELAYED RELEASE ORAL
Qty: 30 CAPSULE | Refills: 2 | Status: SHIPPED | OUTPATIENT
Start: 2017-07-19 | End: 2017-10-25 | Stop reason: SDUPTHER

## 2017-07-19 RX ORDER — HYDROXYCHLOROQUINE SULFATE 200 MG/1
TABLET, FILM COATED ORAL
Qty: 60 TABLET | Refills: 2 | Status: SHIPPED | OUTPATIENT
Start: 2017-07-19 | End: 2017-10-25 | Stop reason: SDUPTHER

## 2017-08-10 ENCOUNTER — TELEPHONE (OUTPATIENT)
Dept: PHARMACY | Facility: CLINIC | Age: 52
End: 2017-08-10

## 2017-08-10 ENCOUNTER — PATIENT MESSAGE (OUTPATIENT)
Dept: RHEUMATOLOGY | Facility: CLINIC | Age: 52
End: 2017-08-10

## 2017-08-10 DIAGNOSIS — M54.32 SCIATICA OF LEFT SIDE: ICD-10-CM

## 2017-08-10 DIAGNOSIS — M06.00 SERONEGATIVE RHEUMATOID ARTHRITIS: ICD-10-CM

## 2017-08-10 DIAGNOSIS — R51.9 GENERALIZED HEADACHES: ICD-10-CM

## 2017-08-10 DIAGNOSIS — M79.7 FIBROMYALGIA: Chronic | ICD-10-CM

## 2017-08-10 RX ORDER — HYDROCODONE BITARTRATE AND ACETAMINOPHEN 5; 325 MG/1; MG/1
TABLET ORAL
Qty: 105 TABLET | Refills: 0 | Status: SHIPPED | OUTPATIENT
Start: 2017-08-10 | End: 2017-09-06 | Stop reason: SDUPTHER

## 2017-08-14 ENCOUNTER — TELEPHONE (OUTPATIENT)
Dept: PHARMACY | Facility: CLINIC | Age: 52
End: 2017-08-14

## 2017-08-25 ENCOUNTER — TELEPHONE (OUTPATIENT)
Dept: PHARMACY | Facility: CLINIC | Age: 52
End: 2017-08-25

## 2017-09-06 ENCOUNTER — PATIENT MESSAGE (OUTPATIENT)
Dept: RHEUMATOLOGY | Facility: CLINIC | Age: 52
End: 2017-09-06

## 2017-09-06 DIAGNOSIS — M79.7 FIBROMYALGIA: Chronic | ICD-10-CM

## 2017-09-06 DIAGNOSIS — R51.9 GENERALIZED HEADACHES: ICD-10-CM

## 2017-09-06 DIAGNOSIS — M06.00 SERONEGATIVE RHEUMATOID ARTHRITIS: ICD-10-CM

## 2017-09-06 DIAGNOSIS — M54.32 SCIATICA OF LEFT SIDE: ICD-10-CM

## 2017-09-06 RX ORDER — HYDROCODONE BITARTRATE AND ACETAMINOPHEN 5; 325 MG/1; MG/1
TABLET ORAL
Qty: 105 TABLET | Refills: 0 | Status: SHIPPED | OUTPATIENT
Start: 2017-09-06 | End: 2017-11-01 | Stop reason: SDUPTHER

## 2017-09-12 NOTE — TELEPHONE ENCOUNTER
DOCUMENTATION ONLY  Prior authorization re auth approved from 9/12/2017 through 9/12/2018. Copay is $3.00. LBM

## 2017-09-13 ENCOUNTER — TELEPHONE (OUTPATIENT)
Dept: PHARMACY | Facility: HOSPITAL | Age: 52
End: 2017-09-13

## 2017-09-13 NOTE — TELEPHONE ENCOUNTER
Total RAPID3 Assessment Score  Description: The RAPID3 score is a sum of the converted Patient Functional Status Score, the Pain Tolerance score and the Global health score.  Score Value:   13    Score Stratification: High Activity  Recommended Follow up: Fri Oct 13 2017            patient is doing well on enbrel, she does think that it is working.  we will reassess TTT in the recommended 1 month.  she confirmed shipping of enbrel 9/14/17.

## 2017-09-21 DIAGNOSIS — M06.00 SERONEGATIVE RHEUMATOID ARTHRITIS: ICD-10-CM

## 2017-09-22 RX ORDER — MELOXICAM 7.5 MG/1
TABLET ORAL
Qty: 180 TABLET | Refills: 0 | Status: SHIPPED | OUTPATIENT
Start: 2017-09-22 | End: 2017-12-11

## 2017-10-05 ENCOUNTER — TELEPHONE (OUTPATIENT)
Dept: PHARMACY | Facility: CLINIC | Age: 52
End: 2017-10-05

## 2017-10-07 DIAGNOSIS — M79.7 FIBROMYALGIA: Chronic | ICD-10-CM

## 2017-10-08 ENCOUNTER — PATIENT MESSAGE (OUTPATIENT)
Dept: RHEUMATOLOGY | Facility: CLINIC | Age: 52
End: 2017-10-08

## 2017-10-09 DIAGNOSIS — R51.9 GENERALIZED HEADACHES: ICD-10-CM

## 2017-10-09 DIAGNOSIS — M06.00 SERONEGATIVE RHEUMATOID ARTHRITIS: ICD-10-CM

## 2017-10-09 DIAGNOSIS — M79.7 FIBROMYALGIA: Chronic | ICD-10-CM

## 2017-10-09 DIAGNOSIS — M54.32 SCIATICA OF LEFT SIDE: ICD-10-CM

## 2017-10-09 RX ORDER — METHOCARBAMOL 750 MG/1
TABLET, FILM COATED ORAL
Qty: 60 TABLET | Refills: 0 | OUTPATIENT
Start: 2017-10-09

## 2017-10-09 RX ORDER — HYDROCODONE BITARTRATE AND ACETAMINOPHEN 5; 325 MG/1; MG/1
TABLET ORAL
Qty: 105 TABLET | Refills: 0 | OUTPATIENT
Start: 2017-10-09

## 2017-10-10 ENCOUNTER — PATIENT MESSAGE (OUTPATIENT)
Dept: RHEUMATOLOGY | Facility: CLINIC | Age: 52
End: 2017-10-10

## 2017-10-10 RX ORDER — HYDROCODONE BITARTRATE AND ACETAMINOPHEN 5; 325 MG/1; MG/1
TABLET ORAL
Qty: 105 TABLET | Refills: 0 | OUTPATIENT
Start: 2017-10-10

## 2017-10-25 DIAGNOSIS — M06.09 RHEUMATOID ARTHRITIS OF MULTIPLE SITES WITH NEGATIVE RHEUMATOID FACTOR: ICD-10-CM

## 2017-10-25 DIAGNOSIS — M79.7 FIBROMYALGIA: Chronic | ICD-10-CM

## 2017-10-25 RX ORDER — DULOXETIN HYDROCHLORIDE 60 MG/1
CAPSULE, DELAYED RELEASE ORAL
Qty: 30 CAPSULE | Refills: 0 | Status: SHIPPED | OUTPATIENT
Start: 2017-10-25 | End: 2017-11-27 | Stop reason: SDUPTHER

## 2017-10-25 RX ORDER — HYDROXYCHLOROQUINE SULFATE 200 MG/1
TABLET, FILM COATED ORAL
Qty: 60 TABLET | Refills: 0 | Status: SHIPPED | OUTPATIENT
Start: 2017-10-25 | End: 2017-11-01 | Stop reason: SDUPTHER

## 2017-11-01 ENCOUNTER — LAB VISIT (OUTPATIENT)
Dept: LAB | Facility: HOSPITAL | Age: 52
End: 2017-11-01
Attending: INTERNAL MEDICINE
Payer: MEDICAID

## 2017-11-01 ENCOUNTER — OFFICE VISIT (OUTPATIENT)
Dept: RHEUMATOLOGY | Facility: CLINIC | Age: 52
End: 2017-11-01
Payer: MEDICAID

## 2017-11-01 VITALS
DIASTOLIC BLOOD PRESSURE: 91 MMHG | SYSTOLIC BLOOD PRESSURE: 132 MMHG | WEIGHT: 181.5 LBS | HEART RATE: 77 BPM | HEIGHT: 69 IN | BODY MASS INDEX: 26.88 KG/M2

## 2017-11-01 DIAGNOSIS — M54.32 SCIATICA OF LEFT SIDE: ICD-10-CM

## 2017-11-01 DIAGNOSIS — R51.9 GENERALIZED HEADACHES: ICD-10-CM

## 2017-11-01 DIAGNOSIS — M79.7 FIBROMYALGIA: Chronic | ICD-10-CM

## 2017-11-01 DIAGNOSIS — M06.00 SERONEGATIVE RHEUMATOID ARTHRITIS: Primary | ICD-10-CM

## 2017-11-01 DIAGNOSIS — M06.00 SERONEGATIVE RHEUMATOID ARTHRITIS: ICD-10-CM

## 2017-11-01 DIAGNOSIS — M06.09 RHEUMATOID ARTHRITIS OF MULTIPLE SITES WITH NEGATIVE RHEUMATOID FACTOR: ICD-10-CM

## 2017-11-01 LAB
ALBUMIN SERPL BCP-MCNC: 4.2 G/DL
ALP SERPL-CCNC: 86 U/L
ALT SERPL W/O P-5'-P-CCNC: 21 U/L
ANION GAP SERPL CALC-SCNC: 8 MMOL/L
AST SERPL-CCNC: 19 U/L
BASOPHILS # BLD AUTO: 0 K/UL
BASOPHILS NFR BLD: 0 %
BILIRUB SERPL-MCNC: 0.4 MG/DL
BUN SERPL-MCNC: 12 MG/DL
CALCIUM SERPL-MCNC: 10.3 MG/DL
CHLORIDE SERPL-SCNC: 105 MMOL/L
CO2 SERPL-SCNC: 27 MMOL/L
CREAT SERPL-MCNC: 0.8 MG/DL
CRP SERPL-MCNC: 1.7 MG/L
DIFFERENTIAL METHOD: NORMAL
EOSINOPHIL # BLD AUTO: 0 K/UL
EOSINOPHIL NFR BLD: 0.2 %
ERYTHROCYTE [DISTWIDTH] IN BLOOD BY AUTOMATED COUNT: 12.3 %
ERYTHROCYTE [SEDIMENTATION RATE] IN BLOOD BY WESTERGREN METHOD: 5 MM/HR
EST. GFR  (AFRICAN AMERICAN): >60 ML/MIN/1.73 M^2
EST. GFR  (NON AFRICAN AMERICAN): >60 ML/MIN/1.73 M^2
GLUCOSE SERPL-MCNC: 93 MG/DL
HCT VFR BLD AUTO: 43.3 %
HGB BLD-MCNC: 14.1 G/DL
IMM GRANULOCYTES # BLD AUTO: 0.01 K/UL
IMM GRANULOCYTES NFR BLD AUTO: 0.2 %
LYMPHOCYTES # BLD AUTO: 1.3 K/UL
LYMPHOCYTES NFR BLD: 27.4 %
MCH RBC QN AUTO: 27.5 PG
MCHC RBC AUTO-ENTMCNC: 32.6 G/DL
MCV RBC AUTO: 84 FL
MONOCYTES # BLD AUTO: 0.4 K/UL
MONOCYTES NFR BLD: 8 %
NEUTROPHILS # BLD AUTO: 3 K/UL
NEUTROPHILS NFR BLD: 64.2 %
NRBC BLD-RTO: 0 /100 WBC
PLATELET # BLD AUTO: 232 K/UL
PMV BLD AUTO: 10.6 FL
POTASSIUM SERPL-SCNC: 3.7 MMOL/L
PROT SERPL-MCNC: 7.7 G/DL
RBC # BLD AUTO: 5.13 M/UL
SODIUM SERPL-SCNC: 140 MMOL/L
WBC # BLD AUTO: 4.74 K/UL

## 2017-11-01 PROCEDURE — 86140 C-REACTIVE PROTEIN: CPT

## 2017-11-01 PROCEDURE — 99214 OFFICE O/P EST MOD 30 MIN: CPT | Mod: S$PBB,,, | Performed by: INTERNAL MEDICINE

## 2017-11-01 PROCEDURE — 85025 COMPLETE CBC W/AUTO DIFF WBC: CPT

## 2017-11-01 PROCEDURE — 99213 OFFICE O/P EST LOW 20 MIN: CPT | Mod: PBBFAC | Performed by: INTERNAL MEDICINE

## 2017-11-01 PROCEDURE — 85651 RBC SED RATE NONAUTOMATED: CPT

## 2017-11-01 PROCEDURE — 36415 COLL VENOUS BLD VENIPUNCTURE: CPT

## 2017-11-01 PROCEDURE — 99999 PR PBB SHADOW E&M-EST. PATIENT-LVL III: CPT | Mod: PBBFAC,,, | Performed by: INTERNAL MEDICINE

## 2017-11-01 PROCEDURE — 80053 COMPREHEN METABOLIC PANEL: CPT

## 2017-11-01 RX ORDER — HYDROCODONE BITARTRATE AND ACETAMINOPHEN 5; 325 MG/1; MG/1
TABLET ORAL
Qty: 105 TABLET | Refills: 0 | Status: SHIPPED | OUTPATIENT
Start: 2017-11-01 | End: 2017-11-28 | Stop reason: SDUPTHER

## 2017-11-01 RX ORDER — METHOCARBAMOL 750 MG/1
750 TABLET, FILM COATED ORAL 2 TIMES DAILY PRN
Qty: 60 TABLET | Refills: 2 | Status: SHIPPED | OUTPATIENT
Start: 2017-11-01 | End: 2018-01-24 | Stop reason: SDUPTHER

## 2017-11-01 RX ORDER — HYDROXYCHLOROQUINE SULFATE 200 MG/1
200 TABLET, FILM COATED ORAL 2 TIMES DAILY
Qty: 60 TABLET | Refills: 5 | Status: SHIPPED | OUTPATIENT
Start: 2017-11-01 | End: 2017-11-27

## 2017-11-01 ASSESSMENT — DISEASE ACTIVITY SCORE (DAS28)
ESR_MM_PER_HR: 5
GLOBAL_HEALTH_SCORE: 60
TENDER_JOINTS_COUNT: 0
CRP_MG_PER_LITER: 1.7
TOTAL_SCORE_CRP: 2.64
SWOLLEN_JOINTS_COUNT: 3
TOTAL_SCORE_ESR: 2.45

## 2017-11-01 ASSESSMENT — ROUTINE ASSESSMENT OF PATIENT INDEX DATA (RAPID3)
AM STIFFNESS SCORE: 1, YES
PAIN SCORE: 6.5
FATIGUE SCORE: 6
PSYCHOLOGICAL DISTRESS SCORE: 2.2
TOTAL RAPID3 SCORE: 4.72
WHEN YOU AWAKENED IN THE MORNING OVER THE LAST WEEK, PLEASE INDICATE THE AMOUNT OF TIME IT TAKES UNTIL YOU ARE AS LIMBER AS YOU WILL BE FOR THE DAY: 25 MINUTES
PATIENT GLOBAL ASSESSMENT SCORE: 6
MDHAQ FUNCTION SCORE: .5

## 2017-11-01 NOTE — PROGRESS NOTES
"Subjective:       Patient ID: Angela Carlson is a 52 y.o. female.    Chief Complaint: Fibromyalgia and Rheumatoid Arthritis    F/u seroneg RA, FMS  RA since ~2004;   hx MTX and HCQ 2004-5;   Enbrel ~2010, stopped when developed neutropenia; sx remitted 2013-15, then recurrent synovitis;   HCQ 2015-current  Enbrel restarted March 2017    FMS: Dx before 2013; robaxin and doxepin; nortriptyline added 2014; hydrocodone since 2014 after failed tramadol     Current meds:  Enbrel 50 weekly   bid ; went to eye clinic Dr Valdivia Jan 2017  Meloxicam 7.5 mg  Duloxetine 60  Nortriptyline 75  Hydrocodone : has been using 105 over 90 days  Robaxin, helps her; says less hand and wrist pain ; usually takes afternoon or night                Associated symptoms include fatigue and headaches. Pertinent negatives include no fever.       Fibromyalgia   Associated symptoms include fatigue and headaches. Pertinent negatives include no abdominal pain, chest pain, coughing, fever or rash.      Enbrel has helped morning stiffness and joint pains and mobility  Now c/o FMS pain in neck and shoulders and back    Stays busy ; always cleaning or gardening  Family activities with sister's children    Review of Systems   Constitutional: Positive for fatigue and unexpected weight change. Negative for fever.   HENT: Negative for mouth sores.    Respiratory: Negative for cough and shortness of breath.    Cardiovascular: Negative for chest pain.   Gastrointestinal: Negative for abdominal pain and diarrhea.   Skin: Negative for rash.   Neurological: Positive for headaches.   Psychiatric/Behavioral: Negative for sleep disturbance.         Objective:     BP (!) 132/91 (BP Location: Left arm, Patient Position: Sitting, BP Method: Small (Automatic))   Pulse 77   Ht 5' 9" (1.753 m)   Wt 82.3 kg (181 lb 8 oz)   BMI 26.80 kg/m²      Physical Exam   Constitutional: She is well-developed, well-nourished, and in no distress.   HENT:   Mouth/Throat: " Oropharynx is clear and moist.   Eyes: Conjunctivae are normal.   Cardiovascular: Normal rate, regular rhythm and normal heart sounds.        Right Side Rheumatological Exam     She has swelling of the 1st MCP, 2nd MCP and 3rd MCP    Left Side Rheumatological Exam     She has swelling of the 2nd MCP and 3rd MCP      Lymphadenopathy:     She has no cervical adenopathy.   Neurological: She is alert.   Skin: No rash noted.           Lab Results   Component Value Date    SEDRATE 5 11/01/2017      Lab Results   Component Value Date    CRP 1.7 11/01/2017      Lab Results   Component Value Date    WBC 4.74 11/01/2017    HGB 14.1 11/01/2017    HCT 43.3 11/01/2017    MCV 84 11/01/2017     11/01/2017        Assessment:       1. Seronegative rheumatoid arthritis    2. Fibromyalgia    3. Generalized headaches    4. Sciatica of left side    5. Rheumatoid arthritis of multiple sites with negative rheumatoid factor        Seronegative RA improved with Enbrel while on hydroxychloroquine; previously failed mtx and previously improved on Enbrel    Fibromyalgia more symptomatic since off of Robaxin    Chronic hydrocodone    Plan:     1. Cont HCQ plus Enbrel  2. Refill Robaxin  3. Refill  Hydrocodone  4. Lab today  4. RTC 3-4 mo with lab

## 2017-11-02 ENCOUNTER — TELEPHONE (OUTPATIENT)
Dept: PHARMACY | Facility: CLINIC | Age: 52
End: 2017-11-02

## 2017-11-03 DIAGNOSIS — D51.8 OTHER VITAMIN B12 DEFICIENCY ANEMIA: ICD-10-CM

## 2017-11-03 RX ORDER — CYANOCOBALAMIN 1000 UG/ML
INJECTION, SOLUTION INTRAMUSCULAR; SUBCUTANEOUS
Qty: 12 ML | Refills: 0 | Status: SHIPPED | OUTPATIENT
Start: 2017-11-03 | End: 2018-03-08 | Stop reason: SDUPTHER

## 2017-11-09 NOTE — TELEPHONE ENCOUNTER
Enbrel refill scheduled for 11/13/17. Address confirmed. Copay $3 (316) - CC on file. Next dose due 11/15/17.     Description: The RAPID3 score is a sum of the converted Patient Functional Status Score, the Pain Tolerance score and the Global health score.  Score Value:   12    Score Stratification: Moderate Activity

## 2017-11-13 RX ORDER — PROMETHAZINE HYDROCHLORIDE 25 MG/1
TABLET ORAL
Qty: 15 TABLET | Refills: 0 | Status: SHIPPED | OUTPATIENT
Start: 2017-11-13 | End: 2018-02-09 | Stop reason: SDUPTHER

## 2017-11-27 DIAGNOSIS — M54.32 SCIATICA OF LEFT SIDE: ICD-10-CM

## 2017-11-27 DIAGNOSIS — M79.7 FIBROMYALGIA: Chronic | ICD-10-CM

## 2017-11-27 DIAGNOSIS — M06.00 SERONEGATIVE RHEUMATOID ARTHRITIS: ICD-10-CM

## 2017-11-27 DIAGNOSIS — R51.9 GENERALIZED HEADACHES: ICD-10-CM

## 2017-11-27 DIAGNOSIS — M06.09 RHEUMATOID ARTHRITIS OF MULTIPLE SITES WITH NEGATIVE RHEUMATOID FACTOR: ICD-10-CM

## 2017-11-27 RX ORDER — DULOXETIN HYDROCHLORIDE 60 MG/1
CAPSULE, DELAYED RELEASE ORAL
Qty: 30 CAPSULE | Refills: 2 | Status: SHIPPED | OUTPATIENT
Start: 2017-11-27 | End: 2018-02-19 | Stop reason: SDUPTHER

## 2017-11-27 RX ORDER — HYDROXYCHLOROQUINE SULFATE 200 MG/1
TABLET, FILM COATED ORAL
Qty: 60 TABLET | Refills: 0 | Status: SHIPPED | OUTPATIENT
Start: 2017-11-27 | End: 2018-08-06

## 2017-11-28 ENCOUNTER — PATIENT MESSAGE (OUTPATIENT)
Dept: RHEUMATOLOGY | Facility: CLINIC | Age: 52
End: 2017-11-28

## 2017-11-28 RX ORDER — HYDROCODONE BITARTRATE AND ACETAMINOPHEN 5; 325 MG/1; MG/1
TABLET ORAL
Qty: 105 TABLET | Refills: 0 | Status: SHIPPED | OUTPATIENT
Start: 2017-11-28 | End: 2017-11-29 | Stop reason: SDUPTHER

## 2017-11-29 DIAGNOSIS — M06.00 SERONEGATIVE RHEUMATOID ARTHRITIS: ICD-10-CM

## 2017-11-29 DIAGNOSIS — R51.9 GENERALIZED HEADACHES: ICD-10-CM

## 2017-11-29 DIAGNOSIS — M79.7 FIBROMYALGIA: Chronic | ICD-10-CM

## 2017-11-29 DIAGNOSIS — M54.32 SCIATICA OF LEFT SIDE: ICD-10-CM

## 2017-11-29 RX ORDER — HYDROCODONE BITARTRATE AND ACETAMINOPHEN 5; 325 MG/1; MG/1
TABLET ORAL
Qty: 105 TABLET | Refills: 0 | Status: SHIPPED | OUTPATIENT
Start: 2017-11-29 | End: 2017-12-26 | Stop reason: SDUPTHER

## 2017-11-29 RX ORDER — PRAVASTATIN SODIUM 80 MG/1
TABLET ORAL
Refills: 1 | COMMUNITY
Start: 2017-11-11 | End: 2021-11-02

## 2017-12-04 ENCOUNTER — TELEPHONE (OUTPATIENT)
Dept: PHARMACY | Facility: CLINIC | Age: 52
End: 2017-12-04

## 2017-12-08 DIAGNOSIS — D51.8 OTHER VITAMIN B12 DEFICIENCY ANEMIA: ICD-10-CM

## 2017-12-08 RX ORDER — CYANOCOBALAMIN 1000 UG/ML
INJECTION, SOLUTION INTRAMUSCULAR; SUBCUTANEOUS
Qty: 12 ML | Refills: 0 | Status: SHIPPED | OUTPATIENT
Start: 2017-12-08 | End: 2018-03-08 | Stop reason: SDUPTHER

## 2017-12-09 DIAGNOSIS — M06.00 SERONEGATIVE RHEUMATOID ARTHRITIS: ICD-10-CM

## 2017-12-11 RX ORDER — MELOXICAM 7.5 MG/1
TABLET ORAL
Qty: 180 TABLET | Refills: 0 | Status: SHIPPED | OUTPATIENT
Start: 2017-12-11 | End: 2018-06-11

## 2017-12-26 ENCOUNTER — PATIENT MESSAGE (OUTPATIENT)
Dept: RHEUMATOLOGY | Facility: CLINIC | Age: 52
End: 2017-12-26

## 2017-12-26 DIAGNOSIS — M79.7 FIBROMYALGIA: Chronic | ICD-10-CM

## 2017-12-26 DIAGNOSIS — M06.00 SERONEGATIVE RHEUMATOID ARTHRITIS: ICD-10-CM

## 2017-12-26 DIAGNOSIS — M54.32 SCIATICA OF LEFT SIDE: ICD-10-CM

## 2017-12-26 DIAGNOSIS — R51.9 GENERALIZED HEADACHES: ICD-10-CM

## 2017-12-27 RX ORDER — HYDROCODONE BITARTRATE AND ACETAMINOPHEN 5; 325 MG/1; MG/1
TABLET ORAL
Qty: 105 TABLET | Refills: 0 | Status: SHIPPED | OUTPATIENT
Start: 2017-12-27 | End: 2018-01-25 | Stop reason: SDUPTHER

## 2018-01-02 ENCOUNTER — TELEPHONE (OUTPATIENT)
Dept: PHARMACY | Facility: CLINIC | Age: 53
End: 2018-01-02

## 2018-01-04 ENCOUNTER — TELEPHONE (OUTPATIENT)
Dept: PHARMACY | Facility: CLINIC | Age: 53
End: 2018-01-04

## 2018-01-05 DIAGNOSIS — D51.8 OTHER VITAMIN B12 DEFICIENCY ANEMIA: ICD-10-CM

## 2018-01-05 RX ORDER — CYANOCOBALAMIN 1000 UG/ML
INJECTION, SOLUTION INTRAMUSCULAR; SUBCUTANEOUS
Qty: 12 ML | Refills: 0 | Status: SHIPPED | OUTPATIENT
Start: 2018-01-05 | End: 2018-03-08 | Stop reason: SDUPTHER

## 2018-01-08 ENCOUNTER — TELEPHONE (OUTPATIENT)
Dept: PHARMACY | Facility: HOSPITAL | Age: 53
End: 2018-01-08

## 2018-01-08 NOTE — TELEPHONE ENCOUNTER
Patient called back in response to voicemail and setup shipment for Enbrel. Patient verified she has not started any new medications. Patient has not developed any new drug allergies or medical conditions. Patient scheduled her ship date for 01-15 -18 for a 01-16 -18 delivery. Patient verified her address on file is correct and would like the medication to go there. Patient verified no doses were missed in the past 4 weeks and has (1) dose remaining on hand. Patient verified understanding of the refill process and has no additional questions at the time. 01-08 -17.

## 2018-01-24 DIAGNOSIS — M79.7 FIBROMYALGIA: Chronic | ICD-10-CM

## 2018-01-24 RX ORDER — METHOCARBAMOL 750 MG/1
TABLET, FILM COATED ORAL
Qty: 60 TABLET | Refills: 2 | Status: SHIPPED | OUTPATIENT
Start: 2018-01-24 | End: 2018-04-15 | Stop reason: SDUPTHER

## 2018-01-25 ENCOUNTER — PATIENT MESSAGE (OUTPATIENT)
Dept: RHEUMATOLOGY | Facility: CLINIC | Age: 53
End: 2018-01-25

## 2018-01-25 DIAGNOSIS — M06.00 SERONEGATIVE RHEUMATOID ARTHRITIS: ICD-10-CM

## 2018-01-25 DIAGNOSIS — M54.32 SCIATICA OF LEFT SIDE: ICD-10-CM

## 2018-01-25 DIAGNOSIS — R51.9 GENERALIZED HEADACHES: ICD-10-CM

## 2018-01-25 DIAGNOSIS — M79.7 FIBROMYALGIA: Chronic | ICD-10-CM

## 2018-01-25 RX ORDER — HYDROCODONE BITARTRATE AND ACETAMINOPHEN 5; 325 MG/1; MG/1
TABLET ORAL
Qty: 105 TABLET | Refills: 0 | Status: SHIPPED | OUTPATIENT
Start: 2018-01-25 | End: 2018-02-19 | Stop reason: SDUPTHER

## 2018-01-26 ENCOUNTER — PATIENT MESSAGE (OUTPATIENT)
Dept: RHEUMATOLOGY | Facility: CLINIC | Age: 53
End: 2018-01-26

## 2018-02-04 DIAGNOSIS — D51.8 OTHER VITAMIN B12 DEFICIENCY ANEMIA: ICD-10-CM

## 2018-02-04 RX ORDER — CYANOCOBALAMIN 1000 UG/ML
INJECTION, SOLUTION INTRAMUSCULAR; SUBCUTANEOUS
Qty: 12 ML | Refills: 0 | Status: SHIPPED | OUTPATIENT
Start: 2018-02-04 | End: 2018-03-08

## 2018-02-05 ENCOUNTER — TELEPHONE (OUTPATIENT)
Dept: PHARMACY | Facility: CLINIC | Age: 53
End: 2018-02-05

## 2018-02-06 ENCOUNTER — PATIENT MESSAGE (OUTPATIENT)
Dept: RHEUMATOLOGY | Facility: CLINIC | Age: 53
End: 2018-02-06

## 2018-02-09 RX ORDER — PROMETHAZINE HYDROCHLORIDE 25 MG/1
TABLET ORAL
Qty: 15 TABLET | Refills: 0 | Status: SHIPPED | OUTPATIENT
Start: 2018-02-09 | End: 2018-08-06 | Stop reason: SDUPTHER

## 2018-02-12 ENCOUNTER — TELEPHONE (OUTPATIENT)
Dept: PHARMACY | Facility: CLINIC | Age: 53
End: 2018-02-12

## 2018-02-19 ENCOUNTER — PATIENT MESSAGE (OUTPATIENT)
Dept: RHEUMATOLOGY | Facility: CLINIC | Age: 53
End: 2018-02-19

## 2018-02-19 DIAGNOSIS — R51.9 GENERALIZED HEADACHES: ICD-10-CM

## 2018-02-19 DIAGNOSIS — M79.7 FIBROMYALGIA: Chronic | ICD-10-CM

## 2018-02-19 DIAGNOSIS — M06.00 SERONEGATIVE RHEUMATOID ARTHRITIS: ICD-10-CM

## 2018-02-19 DIAGNOSIS — M54.32 SCIATICA OF LEFT SIDE: ICD-10-CM

## 2018-02-19 RX ORDER — DULOXETIN HYDROCHLORIDE 60 MG/1
CAPSULE, DELAYED RELEASE ORAL
Qty: 30 CAPSULE | Refills: 0 | Status: SHIPPED | OUTPATIENT
Start: 2018-02-19 | End: 2018-03-08 | Stop reason: SDUPTHER

## 2018-02-19 RX ORDER — HYDROCODONE BITARTRATE AND ACETAMINOPHEN 5; 325 MG/1; MG/1
TABLET ORAL
Qty: 105 TABLET | Refills: 0 | Status: SHIPPED | OUTPATIENT
Start: 2018-02-19 | End: 2018-03-08 | Stop reason: SDUPTHER

## 2018-02-20 ENCOUNTER — PATIENT MESSAGE (OUTPATIENT)
Dept: RHEUMATOLOGY | Facility: CLINIC | Age: 53
End: 2018-02-20

## 2018-03-05 DIAGNOSIS — D51.8 OTHER VITAMIN B12 DEFICIENCY ANEMIA: ICD-10-CM

## 2018-03-05 RX ORDER — CYANOCOBALAMIN 1000 UG/ML
INJECTION, SOLUTION INTRAMUSCULAR; SUBCUTANEOUS
Qty: 12 ML | Refills: 0 | Status: SHIPPED | OUTPATIENT
Start: 2018-03-05 | End: 2018-03-08 | Stop reason: SDUPTHER

## 2018-03-07 DIAGNOSIS — M06.00 SERONEGATIVE RHEUMATOID ARTHRITIS: ICD-10-CM

## 2018-03-07 RX ORDER — MELOXICAM 7.5 MG/1
TABLET ORAL
Qty: 180 TABLET | Refills: 0 | Status: SHIPPED | OUTPATIENT
Start: 2018-03-07 | End: 2018-03-08 | Stop reason: SDUPTHER

## 2018-03-08 ENCOUNTER — OFFICE VISIT (OUTPATIENT)
Dept: RHEUMATOLOGY | Facility: CLINIC | Age: 53
End: 2018-03-08
Payer: MEDICAID

## 2018-03-08 VITALS
SYSTOLIC BLOOD PRESSURE: 159 MMHG | HEIGHT: 69 IN | BODY MASS INDEX: 26.96 KG/M2 | DIASTOLIC BLOOD PRESSURE: 93 MMHG | HEART RATE: 103 BPM | WEIGHT: 182 LBS

## 2018-03-08 DIAGNOSIS — M54.32 SCIATICA OF LEFT SIDE: ICD-10-CM

## 2018-03-08 DIAGNOSIS — M06.00 SERONEGATIVE RHEUMATOID ARTHRITIS: Primary | ICD-10-CM

## 2018-03-08 DIAGNOSIS — R51.9 GENERALIZED HEADACHES: ICD-10-CM

## 2018-03-08 DIAGNOSIS — M79.7 FIBROMYALGIA: Chronic | ICD-10-CM

## 2018-03-08 DIAGNOSIS — Z79.899 HIGH RISK MEDICATION USE: Chronic | ICD-10-CM

## 2018-03-08 PROCEDURE — 99214 OFFICE O/P EST MOD 30 MIN: CPT | Mod: PBBFAC | Performed by: INTERNAL MEDICINE

## 2018-03-08 PROCEDURE — 99214 OFFICE O/P EST MOD 30 MIN: CPT | Mod: S$PBB,,, | Performed by: INTERNAL MEDICINE

## 2018-03-08 PROCEDURE — 99999 PR PBB SHADOW E&M-EST. PATIENT-LVL IV: CPT | Mod: PBBFAC,,, | Performed by: INTERNAL MEDICINE

## 2018-03-08 RX ORDER — HYDROCODONE BITARTRATE AND ACETAMINOPHEN 5; 325 MG/1; MG/1
1 TABLET ORAL EVERY 8 HOURS PRN
Qty: 90 TABLET | Refills: 0 | Status: SHIPPED | OUTPATIENT
Start: 2018-03-08 | End: 2018-04-23 | Stop reason: SDUPTHER

## 2018-03-08 RX ORDER — DULOXETIN HYDROCHLORIDE 60 MG/1
60 CAPSULE, DELAYED RELEASE ORAL DAILY
Qty: 30 CAPSULE | Refills: 5 | Status: SHIPPED | OUTPATIENT
Start: 2018-03-08 | End: 2018-10-11 | Stop reason: SDUPTHER

## 2018-03-08 ASSESSMENT — ROUTINE ASSESSMENT OF PATIENT INDEX DATA (RAPID3)
PATIENT GLOBAL ASSESSMENT SCORE: 4.5
PSYCHOLOGICAL DISTRESS SCORE: 1.1
TOTAL RAPID3 SCORE: 4
WHEN YOU AWAKENED IN THE MORNING OVER THE LAST WEEK, PLEASE INDICATE THE AMOUNT OF TIME IT TAKES UNTIL YOU ARE AS LIMBER AS YOU WILL BE FOR THE DAY: 45 MINUTES
PAIN SCORE: 5.5
FATIGUE SCORE: 7
AM STIFFNESS SCORE: 1, YES
MDHAQ FUNCTION SCORE: .6

## 2018-03-08 NOTE — PROGRESS NOTES
"Subjective:       Patient ID: Angela Carlson is a 52 y.o. female.    Chief Complaint: Disease Management    F/u seroneg RA, FMS  RA since ~2004;   hx MTX and HCQ 2004-5;   Enbrel ~2010, stopped when developed neutropenia; sx remitted 2013-15, then recurrent synovitis;   HCQ 2015-current  Enbrel restarted March 2017    FMS: Dx before 2013; robaxin and doxepin; nortriptyline added 2014; hydrocodone since 2014 after failed tramadol     Current meds:  Enbrel 50 weekly   bid ; went to eye clinic Dr Valdivia Jan 2017  Meloxicam 7.5 mg  Duloxetine 60  Nortriptyline 75  Hydrocodone : has been using 105 over 90 days  Robaxin, helps her; says less hand and wrist pain ; usually takes afternoon or night        Fibromyalgia   Associated symptoms include fatigue. Pertinent negatives include no abdominal pain, chest pain, coughing, fever, headaches or rash.           Associated symptoms include fatigue. Pertinent negatives include no fever or headaches.          Enbrel plus HcQ cont to work well  FMS still symptomatic ; burning in trapezius; aggravating  Stays busy ; always cleaning or gardening  Family activities with sister's children    Just had oral surgery to remove redundant tissue; good outcome    Review of Systems   Constitutional: Positive for fatigue. Negative for fever and unexpected weight change.   HENT: Negative for mouth sores.    Respiratory: Negative for cough and shortness of breath.    Cardiovascular: Negative for chest pain.   Gastrointestinal: Negative for abdominal pain and diarrhea.   Skin: Negative for rash.   Neurological: Negative for headaches.   Psychiatric/Behavioral: Negative for sleep disturbance.         Objective:     BP (!) 159/93   Pulse 103   Ht 5' 9" (1.753 m)   Wt 82.6 kg (182 lb)   BMI 26.88 kg/m²      Physical Exam   Constitutional: She is well-developed, well-nourished, and in no distress.   HENT:   Mouth/Throat: Oropharynx is clear and moist.   Eyes: Conjunctivae are normal. "   Cardiovascular: Normal rate, regular rhythm and normal heart sounds.    Lymphadenopathy:     She has no cervical adenopathy.   Neurological: She is alert.   Skin: No rash noted.       Physical Exam     Tenderness:   Right hand: 2nd MCP and 3rd MCP    Swelling:   Right hand: 2nd MCP and 3rd MCP    YAN-28 tender joint count: 2  YAN-28 swollen joint count: 2  ESR (mm/hr): 5  Patient global assessment: 45  YAN-28 score: 2.94 (Low Disease Activity)       Lab Results   Component Value Date    SEDRATE 5 11/01/2017      Lab Results   Component Value Date    CRP 1.7 11/01/2017      Lab Results   Component Value Date    WBC 4.74 11/01/2017    HGB 14.1 11/01/2017    HCT 43.3 11/01/2017    MCV 84 11/01/2017     11/01/2017        Assessment:       1. Seronegative rheumatoid arthritis    2. Fibromyalgia    3. Generalized headaches    4. Sciatica of left side        Seronegative RA with low disease activity on Enbrel and hydroxychloroquine; previously failed mtx and previously improved on Enbrel    Fibromyalgia with moderate sx    Chronic hydrocodone; discussed with her the need to gradually taper this and she agrees    Plan:     1. Cont HCQ plus Enbrel  2. Refill   Hydrocodone for #90 (reduce from # 105/30d)  3. RTC 3-4 mo with lab

## 2018-03-13 DIAGNOSIS — M06.09 RHEUMATOID ARTHRITIS OF MULTIPLE SITES WITH NEGATIVE RHEUMATOID FACTOR: ICD-10-CM

## 2018-03-14 ENCOUNTER — TELEPHONE (OUTPATIENT)
Dept: PHARMACY | Facility: CLINIC | Age: 53
End: 2018-03-14

## 2018-04-03 PROBLEM — E03.4 HYPOTHYROIDISM DUE TO ACQUIRED ATROPHY OF THYROID: Chronic | Status: ACTIVE | Noted: 2018-04-03

## 2018-04-06 DIAGNOSIS — D51.8 OTHER VITAMIN B12 DEFICIENCY ANEMIA: ICD-10-CM

## 2018-04-06 RX ORDER — CYANOCOBALAMIN 1000 UG/ML
INJECTION, SOLUTION INTRAMUSCULAR; SUBCUTANEOUS
Qty: 12 ML | Refills: 0 | Status: SHIPPED | OUTPATIENT
Start: 2018-04-06 | End: 2018-08-06 | Stop reason: SDUPTHER

## 2018-04-09 ENCOUNTER — TELEPHONE (OUTPATIENT)
Dept: PHARMACY | Facility: CLINIC | Age: 53
End: 2018-04-09

## 2018-04-15 DIAGNOSIS — M79.7 FIBROMYALGIA: Chronic | ICD-10-CM

## 2018-04-15 RX ORDER — METHOCARBAMOL 750 MG/1
TABLET, FILM COATED ORAL
Qty: 60 TABLET | Refills: 0 | Status: SHIPPED | OUTPATIENT
Start: 2018-04-15 | End: 2018-05-12 | Stop reason: SDUPTHER

## 2018-04-23 ENCOUNTER — HOSPITAL ENCOUNTER (EMERGENCY)
Facility: HOSPITAL | Age: 53
Discharge: HOME | End: 2018-04-23
Attending: SURGERY
Payer: MEDICAID

## 2018-04-23 VITALS
WEIGHT: 158 LBS | HEART RATE: 77 BPM | TEMPERATURE: 98 F | BODY MASS INDEX: 23.33 KG/M2 | DIASTOLIC BLOOD PRESSURE: 82 MMHG | SYSTOLIC BLOOD PRESSURE: 146 MMHG | RESPIRATION RATE: 18 BRPM

## 2018-04-23 DIAGNOSIS — H61.20 IMPACTED CERUMEN, UNSPECIFIED LATERALITY: Primary | ICD-10-CM

## 2018-04-23 DIAGNOSIS — M06.00 SERONEGATIVE RHEUMATOID ARTHRITIS: ICD-10-CM

## 2018-04-23 DIAGNOSIS — M54.32 SCIATICA OF LEFT SIDE: ICD-10-CM

## 2018-04-23 DIAGNOSIS — R51.9 GENERALIZED HEADACHES: ICD-10-CM

## 2018-04-23 DIAGNOSIS — M79.7 FIBROMYALGIA: Chronic | ICD-10-CM

## 2018-04-23 DIAGNOSIS — H60.501 ACUTE OTITIS EXTERNA OF RIGHT EAR, UNSPECIFIED TYPE: ICD-10-CM

## 2018-04-23 PROCEDURE — 69210 REMOVE IMPACTED EAR WAX UNI: CPT | Mod: RT

## 2018-04-23 PROCEDURE — 99283 EMERGENCY DEPT VISIT LOW MDM: CPT | Mod: 25

## 2018-04-23 RX ORDER — NEOMYCIN SULFATE, POLYMYXIN B SULFATE AND HYDROCORTISONE 10; 3.5; 1 MG/ML; MG/ML; [USP'U]/ML
4 SUSPENSION/ DROPS AURICULAR (OTIC) 3 TIMES DAILY
Qty: 10 ML | Refills: 0 | Status: SHIPPED | OUTPATIENT
Start: 2018-04-23 | End: 2018-08-06

## 2018-04-23 RX ORDER — HYDROCODONE BITARTRATE AND ACETAMINOPHEN 5; 325 MG/1; MG/1
1 TABLET ORAL EVERY 8 HOURS PRN
Qty: 90 TABLET | Refills: 0 | Status: SHIPPED | OUTPATIENT
Start: 2018-04-26 | End: 2018-04-26 | Stop reason: SDUPTHER

## 2018-04-23 NOTE — ED PROVIDER NOTES
Encounter Date: 2018       History     Chief Complaint   Patient presents with    Hearing Problem     right ear decreased hearing     The history is provided by the patient.   Otalgia   This is a new problem. The current episode started several days ago. There is pain in the right ear. Episode frequency: with movement of ear. The problem has been unchanged. Associated symptoms include hearing loss (decrease in hearing). Pertinent negatives include no headaches, no rhinorrhea, no sore throat, no abdominal pain, no diarrhea, no vomiting, no cough and no rash.     Review of patient's allergies indicates:  No Known Allergies  Past Medical History:   Diagnosis Date    Anemia     Anxiety     Arthritis     Depression     Fibromyalgia 2013    Heart murmur     mitral valve prolapse    Hypertension     Lupus     Neutropenia associated with autoimmune disease 2013    Thyroid disease      Past Surgical History:   Procedure Laterality Date     SECTION, CLASSIC      COLONOSCOPY      HYSTERECTOMY      KNEE SURGERY      SOFT PALATE REVISION      TONSILLECTOMY       Family History   Problem Relation Age of Onset    Diabetes Mother     Lupus Father     Diabetes Maternal Aunt     Diabetes Maternal Grandmother     Stroke Maternal Grandfather     Cancer Paternal Aunt      brain cancer    Lupus Paternal Aunt      Social History   Substance Use Topics    Smoking status: Never Smoker    Smokeless tobacco: Never Used    Alcohol use No     Review of Systems   Constitutional: Negative for chills, fatigue and fever.   HENT: Positive for ear pain and hearing loss (decrease in hearing). Negative for congestion, dental problem, rhinorrhea, sore throat and trouble swallowing.    Eyes: Negative for pain, discharge, redness and visual disturbance.   Respiratory: Negative for cough, chest tightness, shortness of breath and wheezing.    Cardiovascular: Negative for chest pain, palpitations and leg  swelling.   Gastrointestinal: Negative for abdominal pain, constipation, diarrhea, nausea and vomiting.   Genitourinary: Negative for difficulty urinating, dysuria, flank pain, frequency, hematuria and urgency.   Musculoskeletal: Negative for arthralgias, back pain and myalgias.   Skin: Negative for color change, pallor and rash.   Neurological: Negative for seizures, weakness and headaches.   Psychiatric/Behavioral: Negative.        Physical Exam     Initial Vitals [04/23/18 1005]   BP Pulse Resp Temp SpO2   (!) 153/70 81 20 98.3 °F (36.8 °C) --      MAP       97.67         Physical Exam    Nursing note and vitals reviewed.  Constitutional: No distress.   HENT:   Head: Normocephalic and atraumatic.   Right Ear: External ear normal. There is tenderness. No swelling. Decreased hearing is noted.   Left Ear: External ear normal.   Nose: Nose normal.   Mouth/Throat: Oropharynx is clear and moist.   Left: partial view of TM d/t cerumen. Right: cerumen impaction (unable to view TM). Erythema noted to areas of R canal s/p partial cerumen removal.   Eyes: Conjunctivae, EOM and lids are normal. Pupils are equal, round, and reactive to light.   Neck: Normal range of motion. Neck supple.   Cardiovascular: Normal rate, regular rhythm, S1 normal, S2 normal and intact distal pulses.   Pulmonary/Chest: Effort normal and breath sounds normal. No respiratory distress. She has no wheezes. She has no rhonchi.   Abdominal: Soft. Bowel sounds are normal. She exhibits no distension. There is no tenderness.   Musculoskeletal: Normal range of motion.   Lymphadenopathy:     She has no cervical adenopathy.   Neurological: She is alert and oriented to person, place, and time. She has normal strength.   Skin: Skin is warm and dry. Capillary refill takes less than 2 seconds. No rash noted.   Psychiatric: She has a normal mood and affect. Her speech is normal and behavior is normal.         ED Course   Ear Wax Removal  Date/Time: 4/23/2018 11:53  AM  Performed by: BRITTANY CRUZ  Authorized by: DANYA SANCHEZ     Anesthesia:  Local Anesthetic: none  Ceruminolytics applied prior to the procedure.  Location details: right ear  Procedure type: (Both curette and irrigation)    Cerumen Removal Results: Cerumen partially removed.  Patient tolerance: Patient tolerated the procedure well with no immediate complications (Patient reports an increase in hearing after partial removal of wax)  Comments: Verbal consent obtained from patient prior to procedure.  Antibiotic (cortisporin) ear drops prescribed.  Instructed to follow up with ENT.                                      Clinical Impression:   The primary encounter diagnosis was Impacted cerumen, unspecified laterality. A diagnosis of Acute otitis externa of right ear, unspecified type was also pertinent to this visit.    Disposition:   Disposition: Discharged  Condition: Stable     The patient acknowledges that close follow up with medical provider is required. Instructed to follow up with ENT within 2 days. Patient was given specific return precautions. The patient agrees to comply with all instruction and directions given in the ER.     Discharge Medication List as of 4/23/2018 10:45 AM      START taking these medications    Details   neomycin-polymyxin-hydrocortisone (CORTISPORIN) 3.5-10,000-1 mg/mL-unit/mL-% otic suspension Place 4 drops into the right ear 3 (three) times daily., Starting Mon 4/23/2018, Normal                                 Brittany Cruz NP  04/23/18 1265

## 2018-04-23 NOTE — ED TRIAGE NOTES
52 y.o. female presents to ER ED 03 /ED 03A   Chief Complaint   Patient presents with    Hearing Problem     right ear decreased hearing   Pt reports decreased hearing and pain to right ear for one week. No acute distress noted.

## 2018-04-24 ENCOUNTER — PATIENT MESSAGE (OUTPATIENT)
Dept: RHEUMATOLOGY | Facility: CLINIC | Age: 53
End: 2018-04-24

## 2018-04-24 DIAGNOSIS — M06.00 SERONEGATIVE RHEUMATOID ARTHRITIS: ICD-10-CM

## 2018-04-24 DIAGNOSIS — M54.32 SCIATICA OF LEFT SIDE: ICD-10-CM

## 2018-04-24 DIAGNOSIS — M79.7 FIBROMYALGIA: Chronic | ICD-10-CM

## 2018-04-24 DIAGNOSIS — R51.9 GENERALIZED HEADACHES: ICD-10-CM

## 2018-04-26 RX ORDER — HYDROCODONE BITARTRATE AND ACETAMINOPHEN 5; 325 MG/1; MG/1
1 TABLET ORAL EVERY 8 HOURS PRN
Qty: 90 TABLET | Refills: 0 | Status: SHIPPED | OUTPATIENT
Start: 2018-04-26 | End: 2018-05-21 | Stop reason: SDUPTHER

## 2018-04-27 ENCOUNTER — TELEPHONE (OUTPATIENT)
Dept: RHEUMATOLOGY | Facility: CLINIC | Age: 53
End: 2018-04-27

## 2018-05-06 DIAGNOSIS — D51.8 OTHER VITAMIN B12 DEFICIENCY ANEMIA: ICD-10-CM

## 2018-05-07 RX ORDER — CYANOCOBALAMIN 1000 UG/ML
INJECTION, SOLUTION INTRAMUSCULAR; SUBCUTANEOUS
Qty: 12 ML | Refills: 0 | Status: SHIPPED | OUTPATIENT
Start: 2018-05-07 | End: 2018-08-06 | Stop reason: SDUPTHER

## 2018-05-08 ENCOUNTER — TELEPHONE (OUTPATIENT)
Dept: PHARMACY | Facility: CLINIC | Age: 53
End: 2018-05-08

## 2018-05-08 RX ORDER — HYDROXYZINE HYDROCHLORIDE 50 MG/1
TABLET, FILM COATED ORAL
Qty: 60 TABLET | Refills: 0 | Status: SHIPPED | OUTPATIENT
Start: 2018-05-08 | End: 2018-06-29 | Stop reason: SDUPTHER

## 2018-05-12 DIAGNOSIS — M79.7 FIBROMYALGIA: Chronic | ICD-10-CM

## 2018-05-14 RX ORDER — METHOCARBAMOL 750 MG/1
TABLET, FILM COATED ORAL
Qty: 60 TABLET | Refills: 2 | Status: SHIPPED | OUTPATIENT
Start: 2018-05-14 | End: 2018-08-05 | Stop reason: SDUPTHER

## 2018-05-14 RX ORDER — NORTRIPTYLINE HYDROCHLORIDE 75 MG/1
CAPSULE ORAL
Qty: 60 CAPSULE | Refills: 0 | Status: SHIPPED | OUTPATIENT
Start: 2018-05-14 | End: 2018-06-06 | Stop reason: SDUPTHER

## 2018-05-21 ENCOUNTER — PATIENT MESSAGE (OUTPATIENT)
Dept: RHEUMATOLOGY | Facility: CLINIC | Age: 53
End: 2018-05-21

## 2018-05-21 DIAGNOSIS — M79.7 FIBROMYALGIA: Chronic | ICD-10-CM

## 2018-05-21 DIAGNOSIS — M54.32 SCIATICA OF LEFT SIDE: ICD-10-CM

## 2018-05-21 DIAGNOSIS — R51.9 GENERALIZED HEADACHES: ICD-10-CM

## 2018-05-21 DIAGNOSIS — M06.00 SERONEGATIVE RHEUMATOID ARTHRITIS: ICD-10-CM

## 2018-05-21 RX ORDER — HYDROCODONE BITARTRATE AND ACETAMINOPHEN 5; 325 MG/1; MG/1
1 TABLET ORAL EVERY 8 HOURS PRN
Qty: 90 TABLET | Refills: 0 | Status: SHIPPED | OUTPATIENT
Start: 2018-05-30 | End: 2018-06-25 | Stop reason: SDUPTHER

## 2018-06-03 DIAGNOSIS — D51.8 OTHER VITAMIN B12 DEFICIENCY ANEMIA: ICD-10-CM

## 2018-06-04 RX ORDER — CYANOCOBALAMIN 1000 UG/ML
INJECTION, SOLUTION INTRAMUSCULAR; SUBCUTANEOUS
Qty: 12 ML | Refills: 0 | Status: SHIPPED | OUTPATIENT
Start: 2018-06-04 | End: 2018-08-06 | Stop reason: SDUPTHER

## 2018-06-07 RX ORDER — NORTRIPTYLINE HYDROCHLORIDE 75 MG/1
CAPSULE ORAL
Qty: 60 CAPSULE | Refills: 0 | Status: SHIPPED | OUTPATIENT
Start: 2018-06-07 | End: 2018-07-30 | Stop reason: SDUPTHER

## 2018-06-10 DIAGNOSIS — M06.00 SERONEGATIVE RHEUMATOID ARTHRITIS: ICD-10-CM

## 2018-06-11 ENCOUNTER — TELEPHONE (OUTPATIENT)
Dept: PHARMACY | Facility: CLINIC | Age: 53
End: 2018-06-11

## 2018-06-11 RX ORDER — MELOXICAM 7.5 MG/1
TABLET ORAL
Qty: 180 TABLET | Refills: 0 | Status: SHIPPED | OUTPATIENT
Start: 2018-06-11 | End: 2018-08-06

## 2018-06-23 ENCOUNTER — PATIENT MESSAGE (OUTPATIENT)
Dept: RHEUMATOLOGY | Facility: CLINIC | Age: 53
End: 2018-06-23

## 2018-06-25 DIAGNOSIS — M79.7 FIBROMYALGIA: Chronic | ICD-10-CM

## 2018-06-25 DIAGNOSIS — M06.00 SERONEGATIVE RHEUMATOID ARTHRITIS: ICD-10-CM

## 2018-06-25 DIAGNOSIS — M54.32 SCIATICA OF LEFT SIDE: ICD-10-CM

## 2018-06-25 DIAGNOSIS — R51.9 GENERALIZED HEADACHES: ICD-10-CM

## 2018-06-25 RX ORDER — HYDROCODONE BITARTRATE AND ACETAMINOPHEN 5; 325 MG/1; MG/1
1 TABLET ORAL EVERY 8 HOURS PRN
Qty: 90 TABLET | Refills: 0 | Status: SHIPPED | OUTPATIENT
Start: 2018-06-29 | End: 2018-07-23 | Stop reason: SDUPTHER

## 2018-06-29 RX ORDER — HYDROXYZINE HYDROCHLORIDE 50 MG/1
TABLET, FILM COATED ORAL
Qty: 60 TABLET | Refills: 0 | Status: SHIPPED | OUTPATIENT
Start: 2018-06-29 | End: 2018-07-30 | Stop reason: SDUPTHER

## 2018-07-07 DIAGNOSIS — D51.8 OTHER VITAMIN B12 DEFICIENCY ANEMIA: ICD-10-CM

## 2018-07-09 RX ORDER — CYANOCOBALAMIN 1000 UG/ML
INJECTION, SOLUTION INTRAMUSCULAR; SUBCUTANEOUS
Qty: 12 ML | Refills: 0 | Status: SHIPPED | OUTPATIENT
Start: 2018-07-09 | End: 2018-08-06 | Stop reason: SDUPTHER

## 2018-07-10 ENCOUNTER — TELEPHONE (OUTPATIENT)
Dept: PHARMACY | Facility: CLINIC | Age: 53
End: 2018-07-10

## 2018-07-22 ENCOUNTER — PATIENT MESSAGE (OUTPATIENT)
Dept: RHEUMATOLOGY | Facility: CLINIC | Age: 53
End: 2018-07-22

## 2018-07-23 DIAGNOSIS — M79.7 FIBROMYALGIA: Chronic | ICD-10-CM

## 2018-07-23 DIAGNOSIS — M54.32 SCIATICA OF LEFT SIDE: ICD-10-CM

## 2018-07-23 DIAGNOSIS — M06.00 SERONEGATIVE RHEUMATOID ARTHRITIS: ICD-10-CM

## 2018-07-23 DIAGNOSIS — R51.9 GENERALIZED HEADACHES: ICD-10-CM

## 2018-07-23 RX ORDER — HYDROCODONE BITARTRATE AND ACETAMINOPHEN 5; 325 MG/1; MG/1
1 TABLET ORAL EVERY 8 HOURS PRN
Qty: 90 TABLET | Refills: 0 | Status: SHIPPED | OUTPATIENT
Start: 2018-07-27 | End: 2018-08-20 | Stop reason: SDUPTHER

## 2018-07-30 RX ORDER — NORTRIPTYLINE HYDROCHLORIDE 75 MG/1
CAPSULE ORAL
Qty: 60 CAPSULE | Refills: 0 | Status: SHIPPED | OUTPATIENT
Start: 2018-07-30 | End: 2018-08-06 | Stop reason: SDUPTHER

## 2018-07-30 RX ORDER — HYDROXYZINE HYDROCHLORIDE 50 MG/1
TABLET, FILM COATED ORAL
Qty: 60 TABLET | Refills: 0 | Status: SHIPPED | OUTPATIENT
Start: 2018-07-30 | End: 2018-08-06 | Stop reason: SDUPTHER

## 2018-08-03 ENCOUNTER — TELEPHONE (OUTPATIENT)
Dept: PHARMACY | Facility: CLINIC | Age: 53
End: 2018-08-03

## 2018-08-03 DIAGNOSIS — D51.8 OTHER VITAMIN B12 DEFICIENCY ANEMIA: ICD-10-CM

## 2018-08-03 RX ORDER — CYANOCOBALAMIN 1000 UG/ML
INJECTION, SOLUTION INTRAMUSCULAR; SUBCUTANEOUS
Qty: 12 ML | Refills: 0 | OUTPATIENT
Start: 2018-08-03

## 2018-08-05 DIAGNOSIS — M79.7 FIBROMYALGIA: Chronic | ICD-10-CM

## 2018-08-06 ENCOUNTER — OFFICE VISIT (OUTPATIENT)
Dept: NEUROLOGY | Facility: CLINIC | Age: 53
End: 2018-08-06
Payer: MEDICAID

## 2018-08-06 ENCOUNTER — PATIENT MESSAGE (OUTPATIENT)
Dept: ADMINISTRATIVE | Facility: OTHER | Age: 53
End: 2018-08-06

## 2018-08-06 ENCOUNTER — LAB VISIT (OUTPATIENT)
Dept: LAB | Facility: HOSPITAL | Age: 53
End: 2018-08-06
Attending: PSYCHIATRY & NEUROLOGY
Payer: MEDICAID

## 2018-08-06 VITALS
WEIGHT: 198.44 LBS | HEART RATE: 98 BPM | BODY MASS INDEX: 29.39 KG/M2 | RESPIRATION RATE: 16 BRPM | DIASTOLIC BLOOD PRESSURE: 84 MMHG | HEIGHT: 69 IN | SYSTOLIC BLOOD PRESSURE: 144 MMHG

## 2018-08-06 DIAGNOSIS — D51.8 OTHER VITAMIN B12 DEFICIENCY ANEMIA: ICD-10-CM

## 2018-08-06 DIAGNOSIS — M79.7 FIBROMYALGIA: ICD-10-CM

## 2018-08-06 DIAGNOSIS — G43.109 MIGRAINE WITH AURA AND WITHOUT STATUS MIGRAINOSUS, NOT INTRACTABLE: Primary | ICD-10-CM
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
## 2018-08-06 DIAGNOSIS — G47.33 OSA (OBSTRUCTIVE SLEEP APNEA): ICD-10-CM

## 2018-08-06 LAB — VIT B12 SERPL-MCNC: 1122 PG/ML

## 2018-08-06 PROCEDURE — 36415 COLL VENOUS BLD VENIPUNCTURE: CPT

## 2018-08-06 PROCEDURE — 99214 OFFICE O/P EST MOD 30 MIN: CPT | Mod: S$PBB,,, | Performed by: PSYCHIATRY & NEUROLOGY

## 2018-08-06 PROCEDURE — 99213 OFFICE O/P EST LOW 20 MIN: CPT | Mod: PBBFAC | Performed by: PSYCHIATRY & NEUROLOGY

## 2018-08-06 PROCEDURE — 99999 PR PBB SHADOW E&M-EST. PATIENT-LVL III: CPT | Mod: PBBFAC,,, | Performed by: PSYCHIATRY & NEUROLOGY

## 2018-08-06 PROCEDURE — 82607 VITAMIN B-12: CPT

## 2018-08-06 RX ORDER — PROMETHAZINE HYDROCHLORIDE 25 MG/1
TABLET ORAL
Qty: 15 TABLET | Refills: 1 | Status: SHIPPED | OUTPATIENT
Start: 2018-08-06 | End: 2018-11-05 | Stop reason: SDUPTHER

## 2018-08-06 RX ORDER — NORTRIPTYLINE HYDROCHLORIDE 75 MG/1
CAPSULE ORAL
Qty: 60 CAPSULE | Refills: 11 | Status: SHIPPED | OUTPATIENT
Start: 2018-08-06 | End: 2019-02-05 | Stop reason: SDUPTHER

## 2018-08-06 RX ORDER — METHOCARBAMOL 750 MG/1
TABLET, FILM COATED ORAL
Qty: 60 TABLET | Refills: 0 | Status: SHIPPED | OUTPATIENT
Start: 2018-08-06 | End: 2018-09-02 | Stop reason: SDUPTHER

## 2018-08-06 RX ORDER — HYDROXYZINE HYDROCHLORIDE 50 MG/1
TABLET, FILM COATED ORAL
Qty: 60 TABLET | Refills: 11 | Status: SHIPPED | OUTPATIENT
Start: 2018-08-06 | End: 2019-02-05 | Stop reason: SDUPTHER

## 2018-08-06 NOTE — PROGRESS NOTES
HPI: Angela Carlson is a 53 y.o. female with migraine with aura and with  cervicogenic trigger possibly, although MRI C spine with only minor changes. Some complicated migraine with some alteration of consciousness at times-resolved. Anxiety and depression. She has  TREY.   Symptoms of lumbar radicular pain down the left leg with MRI showing disc bulge and nerve root displacement at S1 on the left 2015.     She is here today for her annual follow up.  Her headaches have been well controlled this past year- only 1-2 migraines triggered by heat exposure. This responded to rest and hydration.  Her back pain is well controlled. She treats any flare ups with rest.   She continues to follow with rheumatology.  She is still taking pamelor 140mg nightly  She takes atarax as needed for anxiety and sleep.  She is still doing B12 injection weekly for deficiency (three times per week) without level check in sometime as oncology is no longer seeing her  Asking about bites on legs.     Review of Systems   Constitutional: Negative for fever.   HENT: Negative for nosebleeds.    Eyes: Negative for double vision.   Respiratory: Negative for hemoptysis.    Cardiovascular: Negative for leg swelling.   Gastrointestinal: Negative for blood in stool.   Genitourinary: Negative for hematuria.   Musculoskeletal: Positive for myalgias.   Skin: Negative for rash.   Neurological: Negative for dizziness.   Psychiatric/Behavioral: The patient does not have insomnia.        Exam:     Gen Appearance, well developed/nourished in no apparent distress  CV: 2+ distal pulses with no edema or swelling  Neuro:  MS: Awake, alert, Sustains attention. Recent/remote memory intact, Language is full to spontaneous speech and comprehension.  Fund of Knowledge is full  CN: Optic discs are flat with normal vasculature, PERRL, Extraoccular movements and visual fields are full. Normal facial sensation and strength, Hearing symmetric, Tongue and Palate are midline  and strong. Shoulder Shrug symmetric and strong.  Motor: Normal bulk, tone, no abnormal movements. 5/5 strength bilateral upper/lower extremities with 2+ reflexes  Sensory: Romberg negative and intact to temp, vibration  Cerebellar: Finger-nose, Rapid alternating movements intact  Gait: Normal stance, no ataxia    She has 3 bites on the left lower leg in a row, now more then 0.3cm, and 2 on the right leg the same. They are well healing.   No recent travel and her bed partner does not have the same. This Is pruritic for her  There is no draining or erythema    Labs: CBC normal in 2/2017      Assessment/Recommendation: Angela Carlson is a 53 y.o. female with migraine with aura and with  cervicogenic trigger possibly, although MRI C spine with only minor changes. Some complicated migraine with some alteration of consciousness at times-resolved. Anxiety and depression. She has  TREY.   Symptoms of lumbar radicular pain down the left leg with MRI showing disc bulge and nerve root displacement at S1 on the left 2015.   I recommend:     1. Saw spine surgery prior- conservative measures recommended. Back pain is currently episodic and responds to rest.   2. Regarding Fibromyalgia and headache prevention: She is using Pamelor   (higher dose per rheumatology prior) for prevention of pain. Also using Cymbalta with Rheumatology and has good pain relief without symptoms of serotonin excessive.  Gabapentin caused GI upset and she tried Lyrica and Zonegran prior  3.  Can continue Atarax PRN( don't use atarax with phenergan) for anxiety. Phenergan for nausea associated with headache- rare use only recommended. Watch for sedation with meds. Rheumatology also managing hydrocodone for pain with RA.   4. Continue CPAP for TREY  5. Prior spells resolved/ could have been complicated migraine  6. Continue B12 monthly for prior deficiency- check level today (she uses B12 three times weekly currently per hematology years ago for iron  deficiency anemia with B12 deficency- anemia resolved now)  7. Use topical antibiotic ointment at night with cortisone cream in the day for 1 week for bites on legs. See PCP if not healed after one week or sooner if fever, drainage or worsening  RTC in   1 year

## 2018-08-11 ENCOUNTER — HOSPITAL ENCOUNTER (EMERGENCY)
Facility: HOSPITAL | Age: 53
Discharge: HOME | End: 2018-08-11
Attending: SURGERY
Payer: MEDICAID

## 2018-08-11 VITALS
SYSTOLIC BLOOD PRESSURE: 175 MMHG | OXYGEN SATURATION: 98 % | DIASTOLIC BLOOD PRESSURE: 88 MMHG | HEART RATE: 66 BPM | TEMPERATURE: 98 F | RESPIRATION RATE: 18 BRPM
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
## 2018-08-11 DIAGNOSIS — S80.262A INSECT BITE OF LEFT KNEE, INITIAL ENCOUNTER: Primary | ICD-10-CM

## 2018-08-11 PROCEDURE — S0077 INJECTION, CLINDAMYCIN PHOSP: HCPCS | Performed by: SURGERY

## 2018-08-11 PROCEDURE — 96372 THER/PROPH/DIAG INJ SC/IM: CPT

## 2018-08-11 PROCEDURE — 99283 EMERGENCY DEPT VISIT LOW MDM: CPT | Mod: 25

## 2018-08-11 PROCEDURE — 25000003 PHARM REV CODE 250: Performed by: SURGERY

## 2018-08-11 RX ORDER — CLINDAMYCIN HYDROCHLORIDE 300 MG/1
300 CAPSULE ORAL 4 TIMES DAILY
Qty: 28 CAPSULE | Refills: 0 | Status: SHIPPED | OUTPATIENT
Start: 2018-08-11 | End: 2018-08-18

## 2018-08-11 RX ORDER — CLINDAMYCIN PHOSPHATE 150 MG/ML
600 INJECTION, SOLUTION INTRAVENOUS
Status: COMPLETED | OUTPATIENT
Start: 2018-08-11 | End: 2018-08-11

## 2018-08-11 RX ORDER — IBUPROFEN 800 MG/1
800 TABLET ORAL EVERY 6 HOURS PRN
Qty: 20 TABLET | Refills: 0 | Status: SHIPPED | OUTPATIENT
Start: 2018-08-11 | End: 2018-09-04

## 2018-08-11 RX ORDER — MUPIROCIN 20 MG/G
OINTMENT TOPICAL 3 TIMES DAILY
Qty: 15 G | Refills: 0 | Status: SHIPPED | OUTPATIENT
Start: 2018-08-11 | End: 2018-08-21

## 2018-08-11 RX ADMIN — CLINDAMYCIN PHOSPHATE 600 MG: 150 INJECTION, SOLUTION INTRAMUSCULAR; INTRAVENOUS at 11:08

## 2018-08-11 NOTE — ED PROVIDER NOTES
[facility]                                                 Chief Complaint  53 y.o. female with Insect Bite (left knee)    History of Present Illness  Angela Carlson presents to the emergency room with left knee insect bite  Patient has a small insect bite the size of a quarter on the left lower shin area  The patient's insect bites just below the knee with no knee joint involvement  Patient's insect but is superficial with no cellulitis or cellulitic spread noted  Pt has a central pustule with no definitive abscess or fluctuance on exam    The history is provided by the patient   device was not used during this ER visit    Past Medical History   -- Anemia      -- Anxiety      -- Arthritis      -- Depression      -- Fibromyalgia      -- Heart murmur      -- Hypertension      -- Lupus      -- Neutropenia associated with autoimmune disease      -- Thyroid disease         Past Surgical History   -- Colonoscopy        --  section, classic        -- Knee surgery        -- Soft palate revision        -- Hysterectomy        -- Tonsillectomy           No Known Allergies     Review of Systems and Physical Exam      Review of Systems  -- Constitution - no fever, denies fatigue, no weakness, no chills  -- Eyes - no tearing or redness, no visual disturbance  -- Ear, Nose - no tinnitus or earache, no nasal congestion or discharge  -- Mouth,Throat - no sore throat, no toothache, normal voice, normal swallowing  -- Respiratory - denies cough and congestion, no shortness of breath, no ALVARADO  -- Cardiovascular - denies chest pain, no palpitations, denies claudication  -- Gastrointestinal - denies abdominal pain, nausea, vomiting, or diarrhea  -- Genitourinary - no dysuria, denies flank pain, no hematuria, no STD risk  -- Musculoskeletal - denies back pain, negative for myalgias and arthralgias   -- Neurological - no headache, denies weakness or seizure; no LOC  -- Skin - insect bite  on the left lower leg    BP (!) 181/97  Pulse 67   Temp 99.6 °F (37.6 °C) (Oral)   SpO2 98%    Physical Exam  -- Nursing note and vitals reviewed  -- Constitutional: Appears well-developed and well-nourished  -- Head: Atraumatic. Normocephalic. No obvious abnormality  -- Eyes: Pupils are equal and reactive to light. Normal conjunctiva and lids  -- Cardiac: Normal rate, regular rhythm and normal heart sounds  -- Pulmonary: Normal respiratory effort, breath sounds clear to auscultation  -- Abdominal: Soft, no tenderness. Normal bowel sounds. Normal liver edge  -- Musculoskeletal: Normal range of motion, no effusions. Joints stable   -- Neurological: No focal deficits. Showed good interaction with staff  -- Vascular: Posterior tibial, dorsalis pedis and radial pulses 2+ bilaterally    -- Lymphatics: No cervical or peripheral lymphadenopathy. No edema noted  -- Skin:  Quarter-size pustule below the left knee, no joint involvement    Emergency Room Course      Treatment and Evaluation  --  mg Clindamycin given today in the ER    Diagnosis  -- The encounter diagnosis was Insect bite of left knee, initial encounter.    Disposition and Plan  -- Disposition: home  -- Condition: stable  -- Follow-up: Patient to follow up with Primary Doctor No in 1-2 days.  -- I advised the patient that we have found no life threatening condition today  -- At this time, I believe the patient is clinically stable for discharge.   -- The patient acknowledges that close follow up with a MD is required   -- Patient agrees to comply with all instruction and direction given in the ER    This note is dictated on Dragon Natural Speaking word recognition program.  There are word recognition mistakes that are occasionally missed on review.            Willy Espinoza MD  08/11/18 0579

## 2018-08-11 NOTE — ED TRIAGE NOTES
Patient presents to the ER with c/o spider bite to left knee.  Patient reports that she was bitten a week ago with worsening of symptoms.

## 2018-08-14 ENCOUNTER — HOSPITAL ENCOUNTER (EMERGENCY)
Facility: HOSPITAL | Age: 53
Discharge: HOME | End: 2018-08-14
Attending: SURGERY
Payer: MEDICAID

## 2018-08-14 ENCOUNTER — PATIENT MESSAGE (OUTPATIENT)
Dept: NEUROLOGY | Facility: CLINIC | Age: 53
End: 2018-08-14

## 2018-08-14 VITALS
HEART RATE: 61 BPM | SYSTOLIC BLOOD PRESSURE: 159 MMHG | RESPIRATION RATE: 18 BRPM | BODY MASS INDEX: 27.32 KG/M2 | OXYGEN SATURATION: 97 % | DIASTOLIC BLOOD PRESSURE: 75 MMHG | TEMPERATURE: 98 F | WEIGHT: 185 LBS

## 2018-08-14 DIAGNOSIS — L02.91 ABSCESS: Primary | ICD-10-CM
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
## 2018-08-14 PROCEDURE — 25000003 PHARM REV CODE 250: Performed by: SURGERY

## 2018-08-14 PROCEDURE — 10060 I&D ABSCESS SIMPLE/SINGLE: CPT

## 2018-08-14 PROCEDURE — 99283 EMERGENCY DEPT VISIT LOW MDM: CPT | Mod: 25

## 2018-08-14 RX ORDER — LIDOCAINE HYDROCHLORIDE 10 MG/ML
5 INJECTION, SOLUTION EPIDURAL; INFILTRATION; INTRACAUDAL; PERINEURAL
Status: COMPLETED | OUTPATIENT
Start: 2018-08-14 | End: 2018-08-14

## 2018-08-14 RX ADMIN — LIDOCAINE HYDROCHLORIDE 50 MG: 10 INJECTION, SOLUTION EPIDURAL; INFILTRATION; INTRACAUDAL; PERINEURAL at 02:08

## 2018-08-14 RX ADMIN — BACITRACIN, NEOMYCIN, POLYMYXIN B 1 EACH: 400; 3.5; 5 OINTMENT TOPICAL at 02:08

## 2018-08-14 NOTE — ED PROVIDER NOTES
Encounter Date: 2018       History     Chief Complaint   Patient presents with    Insect Bite     insect bites to lt leg     Patient is 53-year-old white female who presents with pain and swelling in the left lower extremity just above the knee.  She was seen in this ED about 3 days ago, had I and D performed of the superficial abscess and started on clindamycin therapy.  Since that time she has developed a secondary abscess just above the left knee, with some surrounding erythema and a little bit of sero purulent drainage.          Review of patient's allergies indicates:  No Known Allergies  Past Medical History:   Diagnosis Date    Anemia     Anxiety     Arthritis     Depression     Fibromyalgia 2013    Heart murmur     mitral valve prolapse    Hypertension     Lupus     Neutropenia associated with autoimmune disease 2013    Thyroid disease      Past Surgical History:   Procedure Laterality Date     SECTION, CLASSIC      COLONOSCOPY      HYSTERECTOMY      KNEE SURGERY      SOFT PALATE REVISION      TONSILLECTOMY       Family History   Problem Relation Age of Onset    Diabetes Mother     Lupus Father     Diabetes Maternal Aunt     Diabetes Maternal Grandmother     Stroke Maternal Grandfather     Cancer Paternal Aunt         brain cancer    Lupus Paternal Aunt      Social History     Tobacco Use    Smoking status: Never Smoker    Smokeless tobacco: Never Used   Substance Use Topics    Alcohol use: No    Drug use: No     Review of Systems   HENT: Negative.    Respiratory: Negative.    Cardiovascular: Negative.    Gastrointestinal: Negative.    Skin: Positive for color change and wound.   Neurological: Negative.    Psychiatric/Behavioral: Negative.    All other systems reviewed and are negative.      Physical Exam     Initial Vitals [18 1258]   BP Pulse Resp Temp SpO2   (!) 152/84 68 16 97 °F (36.1 °C) 98 %      MAP       --         Physical Exam    Nursing note  and vitals reviewed.  Constitutional: She appears well-developed and well-nourished.   HENT:   Head: Normocephalic and atraumatic.   Eyes: EOM are normal. Pupils are equal, round, and reactive to light.   Neck: Normal range of motion. Neck supple.   Cardiovascular: Normal rate.   Pulmonary/Chest: Breath sounds normal.   Abdominal: Soft. Bowel sounds are normal.   Neurological: She is alert and oriented to person, place, and time.   Skin: Abscess noted.   Small superficial abscess         ED Course   I & D - Incision and Drainage  Date/Time: 8/14/2018 2:14 PM  Location procedure was performed: [facility] EMERGENCY DEPARTMENT  Performed by: Aj Carlos Jr., MD  Authorized by: Aj Carlos Jr., MD   Pre-operative diagnosis: superficial abscess  Post-operative diagnosis: same  Consent Done: Not Needed  Type: abscess  Body area: lower extremity  Location details: left leg  Anesthesia: local infiltration    Anesthesia:  Local Anesthetic: lidocaine 1% without epinephrine  Anesthetic total: 2 mL  Patient sedated: no  Description of findings: Simple I & D with #15 blade scalpel   Scalpel size: 15  Incision type: single straight  Complexity: simple  Drainage: serosanguinous  Drainage amount: scant  Wound treatment: wound left open  Patient tolerance: Patient tolerated the procedure well with no immediate complications        Labs Reviewed - No data to display       Imaging Results    None                               Clinical Impression:   The encounter diagnosis was Abscess.      Disposition:   Disposition: Discharged  Condition: Stable                        Aj Carlos Jr., MD  08/14/18 [id number]

## 2018-08-14 NOTE — ED NOTES
Pt sitting on bed, no distress noted, she denies needs and concerns at this time, instructed pt to call for needs and concerns , call bell within reach, pt verbalized understanding

## 2018-08-20 ENCOUNTER — PATIENT MESSAGE (OUTPATIENT)
Dept: RHEUMATOLOGY | Facility: CLINIC | Age: 53
End: 2018-08-20

## 2018-08-20 DIAGNOSIS — R51.9 GENERALIZED HEADACHES: ICD-10-CM

## 2018-08-20 DIAGNOSIS — M79.7 FIBROMYALGIA: Chronic | ICD-10-CM

## 2018-08-20 DIAGNOSIS — M54.32 SCIATICA OF LEFT SIDE: ICD-10-CM

## 2018-08-20 DIAGNOSIS — M06.00 SERONEGATIVE RHEUMATOID ARTHRITIS: ICD-10-CM

## 2018-08-20 RX ORDER — HYDROCODONE BITARTRATE AND ACETAMINOPHEN 5; 325 MG/1; MG/1
1 TABLET ORAL EVERY 8 HOURS PRN
Qty: 90 TABLET | Refills: 0 | Status: SHIPPED | OUTPATIENT
Start: 2018-08-20 | End: 2018-09-21 | Stop reason: SDUPTHER

## 2018-08-31 ENCOUNTER — TELEPHONE (OUTPATIENT)
Dept: PHARMACY | Facility: CLINIC | Age: 53
End: 2018-08-31

## 2018-09-02 DIAGNOSIS — M06.00 SERONEGATIVE RHEUMATOID ARTHRITIS: ICD-10-CM

## 2018-09-02 DIAGNOSIS — M79.7 FIBROMYALGIA: Chronic | ICD-10-CM

## 2018-09-04 ENCOUNTER — PATIENT MESSAGE (OUTPATIENT)
Dept: ADMINISTRATIVE | Facility: OTHER | Age: 53
End: 2018-09-04

## 2018-09-04 RX ORDER — MELOXICAM 7.5 MG/1
TABLET ORAL
Qty: 180 TABLET | Refills: 0 | Status: SHIPPED | OUTPATIENT
Start: 2018-09-04 | End: 2018-11-26

## 2018-09-04 RX ORDER — METHOCARBAMOL 750 MG/1
TABLET, FILM COATED ORAL
Qty: 60 TABLET | Refills: 0 | Status: SHIPPED | OUTPATIENT
Start: 2018-09-04 | End: 2018-10-01 | Stop reason: SDUPTHER

## 2018-09-21 ENCOUNTER — PATIENT MESSAGE (OUTPATIENT)
Dept: RHEUMATOLOGY | Facility: CLINIC | Age: 53
End: 2018-09-21

## 2018-09-21 DIAGNOSIS — M54.32 SCIATICA OF LEFT SIDE: ICD-10-CM

## 2018-09-21 DIAGNOSIS — M06.00 SERONEGATIVE RHEUMATOID ARTHRITIS: ICD-10-CM

## 2018-09-21 DIAGNOSIS — M79.7 FIBROMYALGIA: Chronic | ICD-10-CM

## 2018-09-21 DIAGNOSIS — R51.9 GENERALIZED HEADACHES: ICD-10-CM

## 2018-09-21 RX ORDER — HYDROCODONE BITARTRATE AND ACETAMINOPHEN 5; 325 MG/1; MG/1
1 TABLET ORAL EVERY 8 HOURS PRN
Qty: 90 TABLET | Refills: 0 | Status: SHIPPED | OUTPATIENT
Start: 2018-09-24 | End: 2018-10-22 | Stop reason: SDUPTHER

## 2018-09-23 ENCOUNTER — PATIENT MESSAGE (OUTPATIENT)
Dept: NEUROLOGY | Facility: CLINIC | Age: 53
End: 2018-09-23

## 2018-09-28 ENCOUNTER — TELEPHONE (OUTPATIENT)
Dept: PHARMACY | Facility: CLINIC | Age: 53
End: 2018-09-28

## 2018-10-01 DIAGNOSIS — M79.7 FIBROMYALGIA: Chronic | ICD-10-CM

## 2018-10-01 RX ORDER — METHOCARBAMOL 750 MG/1
TABLET, FILM COATED ORAL
Qty: 60 TABLET | Refills: 2 | Status: SHIPPED | OUTPATIENT
Start: 2018-10-01 | End: 2018-12-21 | Stop reason: SDUPTHER

## 2018-10-11 DIAGNOSIS — M79.7 FIBROMYALGIA: Chronic | ICD-10-CM

## 2018-10-11 RX ORDER — DULOXETIN HYDROCHLORIDE 60 MG/1
CAPSULE, DELAYED RELEASE ORAL
Qty: 30 CAPSULE | Refills: 2 | Status: SHIPPED | OUTPATIENT
Start: 2018-10-11 | End: 2019-02-05 | Stop reason: SDUPTHER

## 2018-10-22 ENCOUNTER — PATIENT MESSAGE (OUTPATIENT)
Dept: RHEUMATOLOGY | Facility: CLINIC | Age: 53
End: 2018-10-22

## 2018-10-22 DIAGNOSIS — M06.00 SERONEGATIVE RHEUMATOID ARTHRITIS: ICD-10-CM

## 2018-10-22 DIAGNOSIS — R51.9 GENERALIZED HEADACHES: ICD-10-CM

## 2018-10-22 DIAGNOSIS — M79.7 FIBROMYALGIA: Chronic | ICD-10-CM

## 2018-10-22 DIAGNOSIS — M54.32 SCIATICA OF LEFT SIDE: ICD-10-CM

## 2018-10-23 ENCOUNTER — TELEPHONE (OUTPATIENT)
Dept: PHARMACY | Facility: CLINIC | Age: 53
End: 2018-10-23

## 2018-10-23 RX ORDER — HYDROCODONE BITARTRATE AND ACETAMINOPHEN 5; 325 MG/1; MG/1
1 TABLET ORAL EVERY 8 HOURS PRN
Qty: 90 TABLET | Refills: 0 | Status: SHIPPED | OUTPATIENT
Start: 2018-10-23 | End: 2018-11-20 | Stop reason: SDUPTHER

## 2018-11-05 RX ORDER — PROMETHAZINE HYDROCHLORIDE 25 MG/1
TABLET ORAL
Qty: 15 TABLET | Refills: 1 | Status: SHIPPED | OUTPATIENT
Start: 2018-11-05 | End: 2019-02-05 | Stop reason: SDUPTHER

## 2018-11-20 ENCOUNTER — PATIENT MESSAGE (OUTPATIENT)
Dept: RHEUMATOLOGY | Facility: CLINIC | Age: 53
End: 2018-11-20

## 2018-11-20 DIAGNOSIS — R51.9 GENERALIZED HEADACHES: ICD-10-CM

## 2018-11-20 DIAGNOSIS — M06.00 SERONEGATIVE RHEUMATOID ARTHRITIS: ICD-10-CM

## 2018-11-20 DIAGNOSIS — M79.7 FIBROMYALGIA: Chronic | ICD-10-CM

## 2018-11-20 DIAGNOSIS — M54.32 SCIATICA OF LEFT SIDE: ICD-10-CM

## 2018-11-20 RX ORDER — HYDROCODONE BITARTRATE AND ACETAMINOPHEN 5; 325 MG/1; MG/1
1 TABLET ORAL EVERY 8 HOURS PRN
Qty: 90 TABLET | Refills: 0 | Status: SHIPPED | OUTPATIENT
Start: 2018-11-22 | End: 2018-12-20 | Stop reason: SDUPTHER

## 2018-11-25 DIAGNOSIS — M06.00 SERONEGATIVE RHEUMATOID ARTHRITIS: ICD-10-CM

## 2018-11-26 ENCOUNTER — TELEPHONE (OUTPATIENT)
Dept: PHARMACY | Facility: CLINIC | Age: 53
End: 2018-11-26

## 2018-11-26 RX ORDER — MELOXICAM 7.5 MG/1
TABLET ORAL
Qty: 180 TABLET | Refills: 0 | Status: SHIPPED | OUTPATIENT
Start: 2018-11-26 | End: 2019-02-25

## 2018-11-27 PROBLEM — I10 HYPERTENSION: Chronic | Status: ACTIVE | Noted: 2018-11-27

## 2018-11-28 DIAGNOSIS — M06.09 RHEUMATOID ARTHRITIS OF MULTIPLE SITES WITH NEGATIVE RHEUMATOID FACTOR: ICD-10-CM

## 2018-12-20 ENCOUNTER — PATIENT MESSAGE (OUTPATIENT)
Dept: RHEUMATOLOGY | Facility: CLINIC | Age: 53
End: 2018-12-20

## 2018-12-20 DIAGNOSIS — M54.32 SCIATICA OF LEFT SIDE: ICD-10-CM

## 2018-12-20 DIAGNOSIS — R51.9 GENERALIZED HEADACHES: ICD-10-CM

## 2018-12-20 DIAGNOSIS — M79.7 FIBROMYALGIA: Chronic | ICD-10-CM

## 2018-12-20 DIAGNOSIS — M06.00 SERONEGATIVE RHEUMATOID ARTHRITIS: ICD-10-CM

## 2018-12-20 RX ORDER — HYDROCODONE BITARTRATE AND ACETAMINOPHEN 5; 325 MG/1; MG/1
1 TABLET ORAL EVERY 8 HOURS PRN
Qty: 90 TABLET | Refills: 0 | Status: SHIPPED | OUTPATIENT
Start: 2018-12-20 | End: 2019-01-18 | Stop reason: SDUPTHER

## 2018-12-21 DIAGNOSIS — M79.7 FIBROMYALGIA: Chronic | ICD-10-CM

## 2018-12-21 RX ORDER — METHOCARBAMOL 750 MG/1
TABLET, FILM COATED ORAL
Qty: 60 TABLET | Refills: 0 | Status: SHIPPED | OUTPATIENT
Start: 2018-12-21 | End: 2019-01-17 | Stop reason: SDUPTHER

## 2018-12-24 ENCOUNTER — TELEPHONE (OUTPATIENT)
Dept: PHARMACY | Facility: CLINIC | Age: 53
End: 2018-12-24

## 2019-01-17 ENCOUNTER — PATIENT MESSAGE (OUTPATIENT)
Dept: RHEUMATOLOGY | Facility: CLINIC | Age: 54
End: 2019-01-17

## 2019-01-17 DIAGNOSIS — M54.32 SCIATICA OF LEFT SIDE: ICD-10-CM

## 2019-01-17 DIAGNOSIS — R51.9 GENERALIZED HEADACHES: ICD-10-CM

## 2019-01-17 DIAGNOSIS — M06.00 SERONEGATIVE RHEUMATOID ARTHRITIS: ICD-10-CM

## 2019-01-17 DIAGNOSIS — M79.7 FIBROMYALGIA: Chronic | ICD-10-CM

## 2019-01-18 ENCOUNTER — PATIENT MESSAGE (OUTPATIENT)
Dept: RHEUMATOLOGY | Facility: CLINIC | Age: 54
End: 2019-01-18

## 2019-01-18 ENCOUNTER — TELEPHONE (OUTPATIENT)
Dept: PHARMACY | Facility: CLINIC | Age: 54
End: 2019-01-18

## 2019-01-18 RX ORDER — METHOCARBAMOL 750 MG/1
TABLET, FILM COATED ORAL
Qty: 60 TABLET | Refills: 0 | Status: SHIPPED | OUTPATIENT
Start: 2019-01-18 | End: 2019-02-13 | Stop reason: SDUPTHER

## 2019-01-18 RX ORDER — HYDROCODONE BITARTRATE AND ACETAMINOPHEN 5; 325 MG/1; MG/1
1 TABLET ORAL EVERY 8 HOURS PRN
Qty: 90 TABLET | Refills: 0 | Status: SHIPPED | OUTPATIENT
Start: 2019-01-18 | End: 2019-02-19 | Stop reason: SDUPTHER

## 2019-01-18 NOTE — TELEPHONE ENCOUNTER
Patient reached regard specialty medication refill for Enbrel Sureclick 50mg/mL $3.00/004- Patient scheduled to have medication ship out on Wed 1/23 to arrive on Thurs 1/24 address confirmed & [facility]. Patient informed no new medications, conditions or allergies since last talked to [facility]. Patient have 1 injection remaining & no missed dose. Patient next injection due date is on Wed 1/24 & patient have 1 injection remaining. Patient declined questions for the clinical pharmacist. Patient voiced understanding.

## 2019-02-05 DIAGNOSIS — M79.7 FIBROMYALGIA: Chronic | ICD-10-CM

## 2019-02-05 RX ORDER — PROMETHAZINE HYDROCHLORIDE 25 MG/1
25 TABLET ORAL EVERY 6 HOURS PRN
Qty: 15 TABLET | Refills: 5 | Status: SHIPPED | OUTPATIENT
Start: 2019-02-05 | End: 2020-07-02 | Stop reason: SDUPTHER

## 2019-02-05 RX ORDER — DULOXETIN HYDROCHLORIDE 60 MG/1
CAPSULE, DELAYED RELEASE ORAL
Qty: 30 CAPSULE | Refills: 2 | Status: SHIPPED | OUTPATIENT
Start: 2019-02-05 | End: 2019-04-02

## 2019-02-05 RX ORDER — HYDROXYZINE HYDROCHLORIDE 50 MG/1
50 TABLET, FILM COATED ORAL 2 TIMES DAILY PRN
Qty: 60 TABLET | Refills: 5 | Status: SHIPPED | OUTPATIENT
Start: 2019-02-05 | End: 2019-09-05 | Stop reason: SDUPTHER

## 2019-02-05 RX ORDER — NORTRIPTYLINE HYDROCHLORIDE 75 MG/1
150 CAPSULE ORAL NIGHTLY
Qty: 60 CAPSULE | Refills: 5 | Status: SHIPPED | OUTPATIENT
Start: 2019-02-05 | End: 2019-08-26 | Stop reason: SDUPTHER

## 2019-02-13 ENCOUNTER — TELEPHONE (OUTPATIENT)
Dept: PHARMACY | Facility: CLINIC | Age: 54
End: 2019-02-13

## 2019-02-13 DIAGNOSIS — M79.7 FIBROMYALGIA: Chronic | ICD-10-CM

## 2019-02-13 DIAGNOSIS — M06.09 RHEUMATOID ARTHRITIS OF MULTIPLE SITES WITH NEGATIVE RHEUMATOID FACTOR: ICD-10-CM

## 2019-02-13 RX ORDER — METHOCARBAMOL 750 MG/1
TABLET, FILM COATED ORAL
Qty: 60 TABLET | Refills: 2 | Status: SHIPPED | OUTPATIENT
Start: 2019-02-13 | End: 2019-05-05 | Stop reason: SDUPTHER

## 2019-02-16 ENCOUNTER — PATIENT MESSAGE (OUTPATIENT)
Dept: RHEUMATOLOGY | Facility: CLINIC | Age: 54
End: 2019-02-16

## 2019-02-19 ENCOUNTER — TELEPHONE (OUTPATIENT)
Dept: PHARMACY | Facility: CLINIC | Age: 54
End: 2019-02-19

## 2019-02-19 ENCOUNTER — PATIENT MESSAGE (OUTPATIENT)
Dept: RHEUMATOLOGY | Facility: CLINIC | Age: 54
End: 2019-02-19

## 2019-02-19 DIAGNOSIS — M54.32 SCIATICA OF LEFT SIDE: ICD-10-CM

## 2019-02-19 DIAGNOSIS — M06.00 SERONEGATIVE RHEUMATOID ARTHRITIS: ICD-10-CM

## 2019-02-19 DIAGNOSIS — M79.7 FIBROMYALGIA: Chronic | ICD-10-CM

## 2019-02-19 DIAGNOSIS — R51.9 GENERALIZED HEADACHES: ICD-10-CM

## 2019-02-19 RX ORDER — HYDROCODONE BITARTRATE AND ACETAMINOPHEN 5; 325 MG/1; MG/1
1 TABLET ORAL EVERY 8 HOURS PRN
Qty: 90 TABLET | Refills: 0 | Status: SHIPPED | OUTPATIENT
Start: 2019-02-19 | End: 2019-03-18 | Stop reason: SDUPTHER

## 2019-02-19 RX ORDER — HYDROCODONE BITARTRATE AND ACETAMINOPHEN 5; 325 MG/1; MG/1
1 TABLET ORAL EVERY 8 HOURS PRN
Qty: 90 TABLET | Refills: 0 | Status: CANCELLED | OUTPATIENT
Start: 2019-02-19 | End: 2019-03-21

## 2019-02-22 DIAGNOSIS — D51.8 OTHER VITAMIN B12 DEFICIENCY ANEMIA: ICD-10-CM

## 2019-02-22 RX ORDER — CYANOCOBALAMIN 1000 UG/ML
INJECTION, SOLUTION INTRAMUSCULAR; SUBCUTANEOUS
Qty: 12 ML | Refills: 0 | Status: SHIPPED | OUTPATIENT
Start: 2019-02-22 | End: 2019-04-02

## 2019-02-24 DIAGNOSIS — M06.00 SERONEGATIVE RHEUMATOID ARTHRITIS: ICD-10-CM

## 2019-02-25 RX ORDER — MELOXICAM 7.5 MG/1
TABLET ORAL
Qty: 180 TABLET | Refills: 0 | Status: SHIPPED | OUTPATIENT
Start: 2019-02-25 | End: 2019-05-23

## 2019-03-18 ENCOUNTER — PATIENT MESSAGE (OUTPATIENT)
Dept: RHEUMATOLOGY | Facility: CLINIC | Age: 54
End: 2019-03-18

## 2019-03-18 DIAGNOSIS — M54.32 SCIATICA OF LEFT SIDE: ICD-10-CM

## 2019-03-18 DIAGNOSIS — M79.7 FIBROMYALGIA: Chronic | ICD-10-CM

## 2019-03-18 DIAGNOSIS — M06.00 SERONEGATIVE RHEUMATOID ARTHRITIS: ICD-10-CM

## 2019-03-18 DIAGNOSIS — M06.09 RHEUMATOID ARTHRITIS OF MULTIPLE SITES WITH NEGATIVE RHEUMATOID FACTOR: ICD-10-CM

## 2019-03-18 DIAGNOSIS — R51.9 GENERALIZED HEADACHES: ICD-10-CM

## 2019-03-18 RX ORDER — HYDROCODONE BITARTRATE AND ACETAMINOPHEN 5; 325 MG/1; MG/1
1 TABLET ORAL EVERY 8 HOURS PRN
Qty: 90 TABLET | Refills: 0 | Status: SHIPPED | OUTPATIENT
Start: 2019-03-18 | End: 2019-04-02

## 2019-03-19 ENCOUNTER — TELEPHONE (OUTPATIENT)
Dept: PHARMACY | Facility: CLINIC | Age: 54
End: 2019-03-19

## 2019-03-19 DIAGNOSIS — D51.8 OTHER VITAMIN B12 DEFICIENCY ANEMIA: ICD-10-CM

## 2019-03-19 RX ORDER — CYANOCOBALAMIN 1000 UG/ML
INJECTION, SOLUTION INTRAMUSCULAR; SUBCUTANEOUS
Qty: 12 ML | Refills: 0 | Status: SHIPPED | OUTPATIENT
Start: 2019-03-19 | End: 2020-06-29

## 2019-04-02 ENCOUNTER — OFFICE VISIT (OUTPATIENT)
Dept: RHEUMATOLOGY | Facility: CLINIC | Age: 54
End: 2019-04-02
Payer: MEDICAID

## 2019-04-02 VITALS
BODY MASS INDEX: 28.77 KG/M2 | WEIGHT: 194.25 LBS | SYSTOLIC BLOOD PRESSURE: 151 MMHG | HEART RATE: 72 BPM | HEIGHT: 69 IN | DIASTOLIC BLOOD PRESSURE: 82 MMHG

## 2019-04-02 DIAGNOSIS — M79.7 FIBROMYALGIA: Chronic | ICD-10-CM

## 2019-04-02 DIAGNOSIS — M06.00 SERONEGATIVE RHEUMATOID ARTHRITIS: Primary | ICD-10-CM

## 2019-04-02 DIAGNOSIS — Z79.899 HIGH RISK MEDICATION USE: Chronic | ICD-10-CM

## 2019-04-02 DIAGNOSIS — R05.9 COUGH: ICD-10-CM

## 2019-04-02 DIAGNOSIS — R51.9 GENERALIZED HEADACHES: ICD-10-CM

## 2019-04-02 DIAGNOSIS — M54.32 SCIATICA OF LEFT SIDE: ICD-10-CM

## 2019-04-02 PROCEDURE — 99214 PR OFFICE/OUTPT VISIT, EST, LEVL IV, 30-39 MIN: ICD-10-PCS | Mod: S$PBB,,, | Performed by: INTERNAL MEDICINE

## 2019-04-02 PROCEDURE — 99213 OFFICE O/P EST LOW 20 MIN: CPT | Mod: PBBFAC | Performed by: INTERNAL MEDICINE

## 2019-04-02 PROCEDURE — 99214 OFFICE O/P EST MOD 30 MIN: CPT | Mod: S$PBB,,, | Performed by: INTERNAL MEDICINE

## 2019-04-02 PROCEDURE — 99999 PR PBB SHADOW E&M-EST. PATIENT-LVL III: ICD-10-PCS | Mod: PBBFAC,,, | Performed by: INTERNAL MEDICINE

## 2019-04-02 PROCEDURE — 99999 PR PBB SHADOW E&M-EST. PATIENT-LVL III: CPT | Mod: PBBFAC,,, | Performed by: INTERNAL MEDICINE

## 2019-04-02 RX ORDER — HYDROCODONE BITARTRATE AND ACETAMINOPHEN 5; 325 MG/1; MG/1
1 TABLET ORAL EVERY 8 HOURS PRN
Qty: 85 TABLET | Refills: 0
Start: 2019-04-02 | End: 2019-04-22 | Stop reason: SDUPTHER

## 2019-04-02 ASSESSMENT — ROUTINE ASSESSMENT OF PATIENT INDEX DATA (RAPID3)
MDHAQ FUNCTION SCORE: .5
PSYCHOLOGICAL DISTRESS SCORE: 3
PATIENT GLOBAL ASSESSMENT SCORE: 3
TOTAL RAPID3 SCORE: 3.22
PAIN SCORE: 5
FATIGUE SCORE: 5

## 2019-04-02 NOTE — ASSESSMENT & PLAN NOTE
Cough but no sputum after recent bronchitis  No other interval issues     Plan CXR  Lab as above  Lab 6 mo if all ok today

## 2019-04-02 NOTE — ASSESSMENT & PLAN NOTE
Symptomatically stable on Enbrel though persistent mcp synovitis    Needs lab and xrays for full assessment of status    Otherwise will cont Enbrel for now  RTC 6 mo if lab and XR ok

## 2019-04-02 NOTE — ASSESSMENT & PLAN NOTE
Off of Cymbalta due to side effects (?)  Not over-utilizing hydrocodone  Willing to reduce Hydrocodone to 85/month

## 2019-04-02 NOTE — PROGRESS NOTES
"Subjective:       Patient ID: Angela Carlson is a 53 y.o. female.    Chief Complaint: Disease Management    F/u seroneg RA, FMS  RA since ~2004;   hx MTX and HCQ 2004-5;   Enbrel ~2010, stopped when developed neutropenia; sx remitted 2013-15, then recurrent synovitis;   HCQ 2015-current  Enbrel restarted March 2017     FMS: Dx before 2013; robaxin and doxepin; nortriptyline added 2014; hydrocodone since 2014 after failed tramadol     Current meds:  Enbrel 50 weekly   bid ; went to eye clinic Dr Valdivia Jan 2017  Meloxicam 7.5 mg  Nortriptyline 75  Hydrocodone : has been using 90 tab/ 90 days  Robaxin, helps her with back pain ; usually takes afternoon or night ; "it's my savior"       Had bronchitis for 2 weeks  No other infections  meds largely stable   90 tab q30d    Review of Systems   Constitutional: Negative for fever and unexpected weight change.   HENT: Negative for mouth sores and trouble swallowing.         No Dry mouth   Eyes: Negative for redness.        No Dry eyes   Respiratory: Positive for cough. Negative for shortness of breath.    Cardiovascular: Negative for chest pain.   Gastrointestinal: Negative for abdominal pain, constipation and diarrhea.   Genitourinary: Negative for dysuria and genital sores.   Skin: Negative for rash.   Neurological: Negative for headaches.   Hematological: Negative for adenopathy. Does not bruise/bleed easily.   Psychiatric/Behavioral: Negative for sleep disturbance.         Objective:   BP (!) 151/82   Pulse 72   Ht 5' 9" (1.753 m)   Wt 88.1 kg (194 lb 3.6 oz)   BMI 28.68 kg/m²      Physical Exam   Constitutional: She is well-developed, well-nourished, and in no distress.   HENT:   Mouth/Throat: Oropharynx is clear and moist.   Eyes: Conjunctivae are normal.   Cardiovascular: Normal rate and regular rhythm.    Pulmonary/Chest: She has no wheezes. She has no rales.   Lymphadenopathy:     She has no cervical adenopathy.   Skin: No rash noted. "     Musculoskeletal:   Synovitis hand MP and PIP joints          Physical Exam     Tenderness:   Right hand: 1st MCP, 2nd MCP, 3rd MCP and 3rd PIP  Left hand: 1st MCP, 2nd MCP, 3rd MCP and 3rd PIP    Swelling:   Right hand: 1st MCP, 2nd MCP, 3rd MCP and 3rd PIP  Left hand: 1st MCP, 2nd MCP, 3rd MCP and 3rd PIP    YAN-28 tender joint count: 8  YAN-28 swollen joint count: 8  Patient global assessment: 30    Lab Results   Component Value Date    SEDRATE 5 11/01/2017          Assessment:       1. Seronegative rheumatoid arthritis    2. High risk medication use    3. Fibromyalgia    4. Generalized headaches    5. Sciatica of left side    6. Cough            Plan:       Problem List Items Addressed This Visit        Active Problems    Seronegative rheumatoid arthritis - Primary     Symptomatically stable on Enbrel though persistent mcp synovitis    Needs lab and xrays for full assessment of status    Otherwise will cont Enbrel for now  RTC 6 mo if lab and XR ok         Relevant Medications    HYDROcodone-acetaminophen (NORCO) 5-325 mg per tablet    Other Relevant Orders    C-reactive protein    CBC auto differential    Sedimentation rate    XR Arthritis Survey    High risk medication use (Chronic)     Cough but no sputum after recent bronchitis  No other interval issues     Plan CXR  Lab as above  Lab 6 mo if all ok today         Relevant Orders    Comprehensive metabolic panel    Quantiferon Gold TB    Hepatitis B core antibody, total    Hepatitis B surface antibody    Hepatitis B surface antigen    Fibromyalgia (Chronic)     Off of Cymbalta due to side effects (?)  Not over-utilizing hydrocodone  Willing to reduce Hydrocodone to 85/month           Relevant Medications    HYDROcodone-acetaminophen (NORCO) 5-325 mg per tablet    Generalized headaches    Relevant Medications    HYDROcodone-acetaminophen (NORCO) 5-325 mg per tablet      Other Visit Diagnoses     Sciatica of left side        Relevant Medications     HYDROcodone-acetaminophen (NORCO) 5-325 mg per tablet    Cough        Relevant Orders    X-Ray Chest PA And Lateral

## 2019-04-09 ENCOUNTER — HOSPITAL ENCOUNTER (OUTPATIENT)
Dept: RADIOLOGY | Facility: HOSPITAL | Age: 54
Discharge: HOME | End: 2019-04-09
Attending: INTERNAL MEDICINE
Payer: MEDICAID

## 2019-04-09 DIAGNOSIS — M06.00 SERONEGATIVE RHEUMATOID ARTHRITIS: ICD-10-CM

## 2019-04-09 DIAGNOSIS — R05.9 COUGH: ICD-10-CM

## 2019-04-09 PROCEDURE — 77077 JOINT SURVEY SINGLE VIEW: CPT | Mod: 26,,, | Performed by: RADIOLOGY

## 2019-04-09 PROCEDURE — 77077 XR ARTHRITIS SURVEY: ICD-10-PCS | Mod: 26,,, | Performed by: RADIOLOGY

## 2019-04-09 PROCEDURE — 71046 X-RAY EXAM CHEST 2 VIEWS: CPT | Mod: 26,,, | Performed by: RADIOLOGY

## 2019-04-09 PROCEDURE — 71046 XR CHEST PA AND LATERAL: ICD-10-PCS | Mod: 26,,, | Performed by: RADIOLOGY

## 2019-04-09 PROCEDURE — 71046 X-RAY EXAM CHEST 2 VIEWS: CPT | Mod: TC

## 2019-04-09 PROCEDURE — 77077 JOINT SURVEY SINGLE VIEW: CPT | Mod: TC

## 2019-04-12 ENCOUNTER — PATIENT MESSAGE (OUTPATIENT)
Dept: NEUROLOGY | Facility: CLINIC | Age: 54
End: 2019-04-12

## 2019-04-15 ENCOUNTER — TELEPHONE (OUTPATIENT)
Dept: PHARMACY | Facility: CLINIC | Age: 54
End: 2019-04-15

## 2019-04-15 DIAGNOSIS — M06.09 RHEUMATOID ARTHRITIS OF MULTIPLE SITES WITH NEGATIVE RHEUMATOID FACTOR: ICD-10-CM

## 2019-04-17 ENCOUNTER — PATIENT MESSAGE (OUTPATIENT)
Dept: RHEUMATOLOGY | Facility: CLINIC | Age: 54
End: 2019-04-17

## 2019-04-17 DIAGNOSIS — M06.09 RHEUMATOID ARTHRITIS OF MULTIPLE SITES WITH NEGATIVE RHEUMATOID FACTOR: ICD-10-CM

## 2019-04-17 DIAGNOSIS — M79.7 FIBROMYALGIA: Chronic | ICD-10-CM

## 2019-04-17 DIAGNOSIS — M06.00 SERONEGATIVE RHEUMATOID ARTHRITIS: ICD-10-CM

## 2019-04-17 DIAGNOSIS — R51.9 GENERALIZED HEADACHES: ICD-10-CM

## 2019-04-17 DIAGNOSIS — M54.32 SCIATICA OF LEFT SIDE: ICD-10-CM

## 2019-04-17 RX ORDER — HYDROCODONE BITARTRATE AND ACETAMINOPHEN 5; 325 MG/1; MG/1
1 TABLET ORAL EVERY 8 HOURS PRN
Qty: 85 TABLET | Refills: 0 | Status: CANCELLED
Start: 2019-04-17 | End: 2019-05-17

## 2019-04-22 ENCOUNTER — PATIENT MESSAGE (OUTPATIENT)
Dept: RHEUMATOLOGY | Facility: CLINIC | Age: 54
End: 2019-04-22

## 2019-04-22 DIAGNOSIS — M79.7 FIBROMYALGIA: Chronic | ICD-10-CM

## 2019-04-22 DIAGNOSIS — D51.8 OTHER VITAMIN B12 DEFICIENCY ANEMIA: ICD-10-CM

## 2019-04-22 DIAGNOSIS — R51.9 GENERALIZED HEADACHES: ICD-10-CM

## 2019-04-22 DIAGNOSIS — M54.32 SCIATICA OF LEFT SIDE: ICD-10-CM

## 2019-04-22 DIAGNOSIS — M06.00 SERONEGATIVE RHEUMATOID ARTHRITIS: ICD-10-CM

## 2019-04-22 RX ORDER — CYANOCOBALAMIN 1000 UG/ML
INJECTION, SOLUTION INTRAMUSCULAR; SUBCUTANEOUS
Qty: 12 ML | Refills: 0 | Status: SHIPPED | OUTPATIENT
Start: 2019-04-22 | End: 2019-08-06

## 2019-04-22 RX ORDER — HYDROCODONE BITARTRATE AND ACETAMINOPHEN 5; 325 MG/1; MG/1
1 TABLET ORAL EVERY 8 HOURS PRN
Qty: 85 TABLET | Refills: 0 | Status: SHIPPED | OUTPATIENT
Start: 2019-04-22 | End: 2019-05-17 | Stop reason: SDUPTHER

## 2019-05-05 DIAGNOSIS — M79.7 FIBROMYALGIA: Chronic | ICD-10-CM

## 2019-05-06 RX ORDER — METHOCARBAMOL 750 MG/1
TABLET, FILM COATED ORAL
Qty: 60 TABLET | Refills: 0 | Status: SHIPPED | OUTPATIENT
Start: 2019-05-06 | End: 2019-06-02 | Stop reason: SDUPTHER

## 2019-05-15 ENCOUNTER — TELEPHONE (OUTPATIENT)
Dept: PHARMACY | Facility: CLINIC | Age: 54
End: 2019-05-15

## 2019-05-17 ENCOUNTER — PATIENT MESSAGE (OUTPATIENT)
Dept: RHEUMATOLOGY | Facility: CLINIC | Age: 54
End: 2019-05-17

## 2019-05-17 DIAGNOSIS — D51.8 OTHER VITAMIN B12 DEFICIENCY ANEMIA: ICD-10-CM

## 2019-05-17 DIAGNOSIS — R51.9 GENERALIZED HEADACHES: ICD-10-CM

## 2019-05-17 DIAGNOSIS — M06.00 SERONEGATIVE RHEUMATOID ARTHRITIS: ICD-10-CM

## 2019-05-17 DIAGNOSIS — M54.32 SCIATICA OF LEFT SIDE: ICD-10-CM

## 2019-05-17 DIAGNOSIS — M79.7 FIBROMYALGIA: Chronic | ICD-10-CM

## 2019-05-17 RX ORDER — HYDROCODONE BITARTRATE AND ACETAMINOPHEN 5; 325 MG/1; MG/1
1 TABLET ORAL EVERY 8 HOURS PRN
Qty: 85 TABLET | Refills: 0 | Status: SHIPPED | OUTPATIENT
Start: 2019-05-21 | End: 2019-06-18 | Stop reason: SDUPTHER

## 2019-05-17 RX ORDER — CYANOCOBALAMIN 1000 UG/ML
INJECTION, SOLUTION INTRAMUSCULAR; SUBCUTANEOUS
Qty: 12 ML | Refills: 0 | Status: SHIPPED | OUTPATIENT
Start: 2019-05-17 | End: 2019-08-06

## 2019-05-23 DIAGNOSIS — M06.00 SERONEGATIVE RHEUMATOID ARTHRITIS: ICD-10-CM

## 2019-05-23 RX ORDER — MELOXICAM 7.5 MG/1
TABLET ORAL
Qty: 180 TABLET | Refills: 0 | Status: SHIPPED | OUTPATIENT
Start: 2019-05-23 | End: 2019-11-13 | Stop reason: SDUPTHER

## 2019-06-02 DIAGNOSIS — M79.7 FIBROMYALGIA: Chronic | ICD-10-CM

## 2019-06-03 RX ORDER — METHOCARBAMOL 750 MG/1
TABLET, FILM COATED ORAL
Qty: 60 TABLET | Refills: 2 | Status: SHIPPED | OUTPATIENT
Start: 2019-06-03 | End: 2019-07-19 | Stop reason: SDUPTHER

## 2019-06-12 ENCOUNTER — TELEPHONE (OUTPATIENT)
Dept: PHARMACY | Facility: CLINIC | Age: 54
End: 2019-06-12

## 2019-06-18 ENCOUNTER — PATIENT MESSAGE (OUTPATIENT)
Dept: RHEUMATOLOGY | Facility: CLINIC | Age: 54
End: 2019-06-18

## 2019-06-18 DIAGNOSIS — M54.32 SCIATICA OF LEFT SIDE: ICD-10-CM

## 2019-06-18 DIAGNOSIS — M06.00 SERONEGATIVE RHEUMATOID ARTHRITIS: ICD-10-CM

## 2019-06-18 DIAGNOSIS — R51.9 GENERALIZED HEADACHES: ICD-10-CM

## 2019-06-18 DIAGNOSIS — M79.7 FIBROMYALGIA: Chronic | ICD-10-CM

## 2019-06-18 RX ORDER — HYDROCODONE BITARTRATE AND ACETAMINOPHEN 5; 325 MG/1; MG/1
1 TABLET ORAL EVERY 8 HOURS PRN
Qty: 85 TABLET | Refills: 0 | Status: SHIPPED | OUTPATIENT
Start: 2019-06-18 | End: 2019-07-19 | Stop reason: SDUPTHER

## 2019-07-11 DIAGNOSIS — M06.09 RHEUMATOID ARTHRITIS OF MULTIPLE SITES WITH NEGATIVE RHEUMATOID FACTOR: ICD-10-CM

## 2019-07-18 ENCOUNTER — TELEPHONE (OUTPATIENT)
Dept: PHARMACY | Facility: CLINIC | Age: 54
End: 2019-07-18

## 2019-07-18 ENCOUNTER — PATIENT MESSAGE (OUTPATIENT)
Dept: RHEUMATOLOGY | Facility: CLINIC | Age: 54
End: 2019-07-18

## 2019-07-18 NOTE — TELEPHONE ENCOUNTER
Patient confirmed shipping of Enbrel on  to arrive . Address and  verified. $0 copay in 004. Patient is in need of a new sharps container. Patient has 0 doses on hand. Patient was to inject yesterday, , but did not have medication on hand. Patient plans to inject upon arrival, on . Patient stated she has not started any new medications or developed any new allergies/health conditions. Patient had no further questions or concerns.

## 2019-07-19 ENCOUNTER — PATIENT MESSAGE (OUTPATIENT)
Dept: RHEUMATOLOGY | Facility: CLINIC | Age: 54
End: 2019-07-19

## 2019-07-19 DIAGNOSIS — M54.32 SCIATICA OF LEFT SIDE: ICD-10-CM

## 2019-07-19 DIAGNOSIS — M79.7 FIBROMYALGIA: Chronic | ICD-10-CM

## 2019-07-19 DIAGNOSIS — R51.9 GENERALIZED HEADACHES: ICD-10-CM

## 2019-07-19 DIAGNOSIS — M06.00 SERONEGATIVE RHEUMATOID ARTHRITIS: ICD-10-CM

## 2019-07-19 RX ORDER — HYDROCODONE BITARTRATE AND ACETAMINOPHEN 5; 325 MG/1; MG/1
1 TABLET ORAL EVERY 8 HOURS PRN
Qty: 85 TABLET | Refills: 0 | Status: SHIPPED | OUTPATIENT
Start: 2019-07-19 | End: 2019-08-19 | Stop reason: SDUPTHER

## 2019-07-19 RX ORDER — METHOCARBAMOL 750 MG/1
TABLET, FILM COATED ORAL
Qty: 60 TABLET | Refills: 2 | Status: SHIPPED | OUTPATIENT
Start: 2019-07-19 | End: 2019-08-23 | Stop reason: SDUPTHER

## 2019-08-09 DIAGNOSIS — D51.8 OTHER VITAMIN B12 DEFICIENCY ANEMIA: ICD-10-CM

## 2019-08-09 RX ORDER — CYANOCOBALAMIN 1000 UG/ML
INJECTION, SOLUTION INTRAMUSCULAR; SUBCUTANEOUS
Qty: 12 ML | Refills: 0 | Status: SHIPPED | OUTPATIENT
Start: 2019-08-09 | End: 2019-08-27 | Stop reason: SDUPTHER

## 2019-08-12 ENCOUNTER — TELEPHONE (OUTPATIENT)
Dept: PHARMACY | Facility: CLINIC | Age: 54
End: 2019-08-12

## 2019-08-18 ENCOUNTER — PATIENT MESSAGE (OUTPATIENT)
Dept: RHEUMATOLOGY | Facility: CLINIC | Age: 54
End: 2019-08-18

## 2019-08-18 DIAGNOSIS — M06.00 SERONEGATIVE RHEUMATOID ARTHRITIS: ICD-10-CM

## 2019-08-18 DIAGNOSIS — M54.32 SCIATICA OF LEFT SIDE: ICD-10-CM

## 2019-08-18 DIAGNOSIS — M79.7 FIBROMYALGIA: Chronic | ICD-10-CM

## 2019-08-18 DIAGNOSIS — R51.9 GENERALIZED HEADACHES: ICD-10-CM

## 2019-08-19 RX ORDER — HYDROCODONE BITARTRATE AND ACETAMINOPHEN 5; 325 MG/1; MG/1
1 TABLET ORAL EVERY 8 HOURS PRN
Qty: 85 TABLET | Refills: 0 | Status: SHIPPED | OUTPATIENT
Start: 2019-08-19 | End: 2019-09-18 | Stop reason: SDUPTHER

## 2019-08-23 DIAGNOSIS — M79.7 FIBROMYALGIA: Chronic | ICD-10-CM

## 2019-08-24 DIAGNOSIS — M79.7 FIBROMYALGIA: Chronic | ICD-10-CM

## 2019-08-25 RX ORDER — METHOCARBAMOL 750 MG/1
TABLET, FILM COATED ORAL
Qty: 60 TABLET | Refills: 2 | Status: SHIPPED | OUTPATIENT
Start: 2019-08-25 | End: 2019-08-27 | Stop reason: SDUPTHER

## 2019-08-25 RX ORDER — METHOCARBAMOL 750 MG/1
TABLET, FILM COATED ORAL
Qty: 60 TABLET | Refills: 2 | Status: SHIPPED | OUTPATIENT
Start: 2019-08-25 | End: 2019-11-13 | Stop reason: SDUPTHER

## 2019-08-26 RX ORDER — NORTRIPTYLINE HYDROCHLORIDE 75 MG/1
150 CAPSULE ORAL NIGHTLY
Qty: 60 CAPSULE | Refills: 0 | Status: SHIPPED | OUTPATIENT
Start: 2019-08-26 | End: 2019-08-27 | Stop reason: SDUPTHER

## 2019-08-27 ENCOUNTER — OFFICE VISIT (OUTPATIENT)
Dept: NEUROLOGY | Facility: CLINIC | Age: 54
End: 2019-08-27
Payer: MEDICAID

## 2019-08-27 VITALS
SYSTOLIC BLOOD PRESSURE: 132 MMHG | HEART RATE: 85 BPM | RESPIRATION RATE: 16 BRPM | DIASTOLIC BLOOD PRESSURE: 80 MMHG | WEIGHT: 182.13 LBS | BODY MASS INDEX: 26.97 KG/M2 | HEIGHT: 69 IN

## 2019-08-27 DIAGNOSIS — G43.109 MIGRAINE WITH AURA AND WITHOUT STATUS MIGRAINOSUS, NOT INTRACTABLE: Primary | ICD-10-CM

## 2019-08-27 DIAGNOSIS — G47.33 OSA (OBSTRUCTIVE SLEEP APNEA): ICD-10-CM

## 2019-08-27 DIAGNOSIS — M79.7 FIBROMYALGIA: ICD-10-CM

## 2019-08-27 DIAGNOSIS — D51.8 OTHER VITAMIN B12 DEFICIENCY ANEMIA: ICD-10-CM

## 2019-08-27 PROCEDURE — 99999 PR PBB SHADOW E&M-EST. PATIENT-LVL III: ICD-10-PCS | Mod: PBBFAC,,, | Performed by: PSYCHIATRY & NEUROLOGY

## 2019-08-27 PROCEDURE — 99214 OFFICE O/P EST MOD 30 MIN: CPT | Mod: S$PBB,,, | Performed by: PSYCHIATRY & NEUROLOGY

## 2019-08-27 PROCEDURE — 99213 OFFICE O/P EST LOW 20 MIN: CPT | Mod: PBBFAC | Performed by: PSYCHIATRY & NEUROLOGY

## 2019-08-27 PROCEDURE — 99214 PR OFFICE/OUTPT VISIT, EST, LEVL IV, 30-39 MIN: ICD-10-PCS | Mod: S$PBB,,, | Performed by: PSYCHIATRY & NEUROLOGY

## 2019-08-27 PROCEDURE — 99999 PR PBB SHADOW E&M-EST. PATIENT-LVL III: CPT | Mod: PBBFAC,,, | Performed by: PSYCHIATRY & NEUROLOGY

## 2019-08-27 RX ORDER — NORTRIPTYLINE HYDROCHLORIDE 75 MG/1
150 CAPSULE ORAL NIGHTLY
Qty: 60 CAPSULE | Refills: 11 | Status: SHIPPED | OUTPATIENT
Start: 2019-08-27 | End: 2020-07-02 | Stop reason: SDUPTHER

## 2019-09-05 RX ORDER — HYDROXYZINE HYDROCHLORIDE 50 MG/1
50 TABLET, FILM COATED ORAL 2 TIMES DAILY PRN
Qty: 60 TABLET | Refills: 5 | Status: SHIPPED | OUTPATIENT
Start: 2019-09-05 | End: 2020-02-20 | Stop reason: SDUPTHER

## 2019-09-12 ENCOUNTER — TELEPHONE (OUTPATIENT)
Dept: PHARMACY | Facility: CLINIC | Age: 54
End: 2019-09-12

## 2019-09-13 NOTE — TELEPHONE ENCOUNTER
DOCUMENTATION ONLY:  Prior RE - authorization for Enbrel 50 mg Sureclick approved from 09/13/2019 to 09/13/2020    Co-pay: $0.00    Patient Assistance IS NOT required. Magdy CLIFFORD

## 2019-09-16 ENCOUNTER — TELEPHONE (OUTPATIENT)
Dept: PHARMACY | Facility: CLINIC | Age: 54
End: 2019-09-16

## 2019-09-18 ENCOUNTER — PATIENT MESSAGE (OUTPATIENT)
Dept: RHEUMATOLOGY | Facility: CLINIC | Age: 54
End: 2019-09-18

## 2019-09-18 DIAGNOSIS — M79.7 FIBROMYALGIA: Chronic | ICD-10-CM

## 2019-09-18 DIAGNOSIS — R51.9 GENERALIZED HEADACHES: ICD-10-CM

## 2019-09-18 DIAGNOSIS — M54.32 SCIATICA OF LEFT SIDE: ICD-10-CM

## 2019-09-18 DIAGNOSIS — M06.00 SERONEGATIVE RHEUMATOID ARTHRITIS: ICD-10-CM

## 2019-09-18 RX ORDER — HYDROCODONE BITARTRATE AND ACETAMINOPHEN 5; 325 MG/1; MG/1
1 TABLET ORAL EVERY 8 HOURS PRN
Qty: 85 TABLET | Refills: 0 | Status: SHIPPED | OUTPATIENT
Start: 2019-09-18 | End: 2019-10-17 | Stop reason: SDUPTHER

## 2019-09-30 ENCOUNTER — HOSPITAL ENCOUNTER (EMERGENCY)
Facility: HOSPITAL | Age: 54
Discharge: HOME | End: 2019-09-30
Attending: SURGERY
Payer: MEDICAID

## 2019-09-30 VITALS
WEIGHT: 182 LBS | TEMPERATURE: 98 F | OXYGEN SATURATION: 97 % | SYSTOLIC BLOOD PRESSURE: 122 MMHG | DIASTOLIC BLOOD PRESSURE: 76 MMHG | BODY MASS INDEX: 26.96 KG/M2 | HEART RATE: 88 BPM | RESPIRATION RATE: 20 BRPM | HEIGHT: 69 IN

## 2019-09-30 DIAGNOSIS — J20.9 ACUTE BRONCHITIS, UNSPECIFIED ORGANISM: Primary | ICD-10-CM

## 2019-09-30 PROCEDURE — 63600175 PHARM REV CODE 636 W HCPCS: Performed by: SURGERY

## 2019-09-30 PROCEDURE — 99284 EMERGENCY DEPT VISIT MOD MDM: CPT | Mod: 25

## 2019-09-30 PROCEDURE — 96372 THER/PROPH/DIAG INJ SC/IM: CPT

## 2019-09-30 RX ORDER — METHYLPREDNISOLONE 4 MG/1
TABLET ORAL
Qty: 1 PACKAGE | Refills: 0 | Status: SHIPPED | OUTPATIENT
Start: 2019-09-30 | End: 2020-07-02

## 2019-09-30 RX ORDER — PROMETHAZINE HYDROCHLORIDE AND DEXTROMETHORPHAN HYDROBROMIDE 6.25; 15 MG/5ML; MG/5ML
5 SYRUP ORAL EVERY 6 HOURS PRN
Qty: 118 ML | Refills: 0 | Status: SHIPPED | OUTPATIENT
Start: 2019-09-30 | End: 2019-10-10

## 2019-09-30 RX ORDER — LEVOFLOXACIN 500 MG/1
500 TABLET, FILM COATED ORAL DAILY
Qty: 7 TABLET | Refills: 0 | Status: SHIPPED | OUTPATIENT
Start: 2019-09-30 | End: 2019-10-07

## 2019-09-30 RX ORDER — METHYLPREDNISOLONE SOD SUCC 125 MG
125 VIAL (EA) INJECTION
Status: COMPLETED | OUTPATIENT
Start: 2019-09-30 | End: 2019-09-30

## 2019-09-30 RX ADMIN — METHYLPREDNISOLONE SODIUM SUCCINATE 125 MG: 125 INJECTION, POWDER, FOR SOLUTION INTRAMUSCULAR; INTRAVENOUS at 01:09

## 2019-09-30 NOTE — ED PROVIDER NOTES
[facility]                                                 Chief Complaint  54 y.o. female with Cough    History of Present Illness  Angela Carlson presents to the emergency room with nasal congestion today  Patient with nasal congestion and cold symptoms, no fever noted now in ER  Patient has no wheezing or shortness of breath, no sputum production noted  Patient denies any flu-like symptoms with no nausea vomiting or diarrhea    The history is provided by the patient   device was not used during this ER visit  Medical history: Anemia, anxiety, arthritis, depression, headache HTN, lupus, sciatica, thyroid disease  Surgeries: , colonoscopy, hysterectomy, knee surgery, soft palate revision and tonsillectomy  No Known Allergies     I have reviewed all of this patient's past medical, surgical, family, and social   histories as well as active allergies and medications documented in the  electronic medical record    Review of Systems and Physical Exam      Review of Systems  -- Constitution - no fever, denies fatigue, no weakness, no chills  -- Eyes - no tearing or redness, no visual disturbance  -- Ear, Nose - sneezing, nasal congestion and clear discharge   -- Mouth,Throat - no sore throat, no toothache, normal voice, normal swallowing  -- Respiratory - cough and congestion, no shortness of breath, no ALVARADO  -- Cardiovascular - denies chest pain, no palpitations, denies claudication  -- Gastrointestinal - denies abdominal pain, nausea, vomiting, or diarrhea  -- Genitourinary - no dysuria, denies flank pain, no hematuria, no STD risk  -- Musculoskeletal - denies back pain, negative for trauma or injury  -- Neurological - no headache, denies weakness or seizure; no LOC  -- Skin - denies pallor, rash, or changes in skin. no hives or welts noted  -- Psychiatric - Denies SI or HI, no psychosis or fractured thought noted     Vital Signs  Her oral temperature is 97.6 °F (36.4  °C).   Her blood pressure is 122/76 and her pulse is 88.   Her respiration is 20 and oxygen saturation is 97%.     Physical Exam  -- Nursing note and vitals reviewed  -- Constitutional: Appears well-developed and well-nourished  -- Head: Atraumatic. Normocephalic. No obvious abnormality  -- Eyes: Pupils are equal and reactive to light. Normal conjunctiva and lids  -- Nose: nasal mucosa erythema and edema; clear nasal discharge noted   -- Throat: Mucous membranes moist, pharynx normal, normal tonsils. No lesions   -- Ears: External ears and TM normal bilaterally. Normal hearing and no drainage  -- Neck: Normal range of motion. Neck supple. No masses, trachea midline  -- Cardiac: Normal rate, regular rhythm and normal heart sounds  -- Pulmonary: Normal respiratory effort, breath sounds clear to auscultation  -- Abdominal: Soft, no tenderness. Normal bowel sounds. Normal liver edge  -- Musculoskeletal: Normal range of motion, no effusions. Joints stable   -- Neurological: No focal deficits. Showed good interaction with staff  -- Vascular: Posterior tibial, dorsalis pedis and radial pulses 2+ bilaterally      Emergency Room Course      Treatment and Evaluation  --  mg Solumedrol given today in the ER    Diagnosis  -- The encounter diagnosis was Acute bronchitis, unspecified organism.    Disposition and Plan  -- Disposition: home  -- Condition: stable  -- Follow-up: Patient to follow up with [facility] in 1-2 days.  -- I advised the patient that we have found no life threatening condition today  -- At this time, I believe the patient is clinically stable for discharge.   -- The patient acknowledges that close follow up with a MD is required   -- Patient agrees to comply with all instruction and direction given in the ER    This note is dictated on M*Modal word recognition program.  There are word recognition mistakes that are occasionally missed on review.         Willy Espinoza MD  09/30/19 7610

## 2019-09-30 NOTE — ED TRIAGE NOTES
54 y.o. female presents to ER   Chief Complaint   Patient presents with    Cough   Pt reports cough since last November off and on. No acute distress noted.

## 2019-10-14 DIAGNOSIS — M06.09 RHEUMATOID ARTHRITIS OF MULTIPLE SITES WITH NEGATIVE RHEUMATOID FACTOR: ICD-10-CM

## 2019-10-15 ENCOUNTER — LAB VISIT (OUTPATIENT)
Dept: LAB | Facility: HOSPITAL | Age: 54
End: 2019-10-15
Attending: INTERNAL MEDICINE
Payer: MEDICAID

## 2019-10-15 DIAGNOSIS — Z79.899 HIGH RISK MEDICATION USE: Chronic | ICD-10-CM

## 2019-10-15 PROCEDURE — 86480 TB TEST CELL IMMUN MEASURE: CPT

## 2019-10-16 ENCOUNTER — PATIENT MESSAGE (OUTPATIENT)
Dept: RHEUMATOLOGY | Facility: CLINIC | Age: 54
End: 2019-10-16

## 2019-10-16 DIAGNOSIS — M06.09 RHEUMATOID ARTHRITIS OF MULTIPLE SITES WITH NEGATIVE RHEUMATOID FACTOR: ICD-10-CM

## 2019-10-17 ENCOUNTER — PATIENT MESSAGE (OUTPATIENT)
Dept: RHEUMATOLOGY | Facility: CLINIC | Age: 54
End: 2019-10-17

## 2019-10-17 DIAGNOSIS — R51.9 GENERALIZED HEADACHES: ICD-10-CM

## 2019-10-17 DIAGNOSIS — M54.32 SCIATICA OF LEFT SIDE: ICD-10-CM

## 2019-10-17 DIAGNOSIS — M06.00 SERONEGATIVE RHEUMATOID ARTHRITIS: ICD-10-CM

## 2019-10-17 DIAGNOSIS — M79.7 FIBROMYALGIA: Chronic | ICD-10-CM

## 2019-10-17 LAB
M TB IFN-G CD4+ BCKGRND COR BLD-ACNC: 0.01 IU/ML
MITOGEN IGNF BCKGRD COR BLD-ACNC: 8.12 IU/ML
MITOGEN IGNF BCKGRD COR BLD-ACNC: NEGATIVE [IU]/ML
NIL: 0.03 IU/ML
TB2 - NIL: 0.01 IU/ML

## 2019-10-17 RX ORDER — HYDROCODONE BITARTRATE AND ACETAMINOPHEN 5; 325 MG/1; MG/1
1 TABLET ORAL EVERY 8 HOURS PRN
Qty: 85 TABLET | Refills: 0 | Status: SHIPPED | OUTPATIENT
Start: 2019-10-17 | End: 2019-11-13

## 2019-10-21 DIAGNOSIS — D51.8 OTHER VITAMIN B12 DEFICIENCY ANEMIA: ICD-10-CM

## 2019-10-21 RX ORDER — CYANOCOBALAMIN 1000 UG/ML
INJECTION, SOLUTION INTRAMUSCULAR; SUBCUTANEOUS
Qty: 12 ML | Refills: 0 | Status: SHIPPED | OUTPATIENT
Start: 2019-10-21 | End: 2019-11-17 | Stop reason: SDUPTHER

## 2019-10-23 ENCOUNTER — TELEPHONE (OUTPATIENT)
Dept: PHARMACY | Facility: CLINIC | Age: 54
End: 2019-10-23

## 2019-10-23 NOTE — TELEPHONE ENCOUNTER
Patient confirmed shipping of Enbrel on 10/23 to arrive 10/24. Address and  verified. $0 copay in 004. Patient reported 0 doses on hand. Dose was due today, 10/23. Patient plans to inject tomorrow, Thurs 10/24, and resume back to . Patient stated she has not started any new medications or had any dose changes. Patient said she has not developed any new allergies or health conditions. Patient had no further questions or concerns.

## 2019-11-13 ENCOUNTER — OFFICE VISIT (OUTPATIENT)
Dept: RHEUMATOLOGY | Facility: CLINIC | Age: 54
End: 2019-11-13
Payer: MEDICAID

## 2019-11-13 VITALS
BODY MASS INDEX: 26.94 KG/M2 | SYSTOLIC BLOOD PRESSURE: 134 MMHG | WEIGHT: 181.88 LBS | HEART RATE: 79 BPM | HEIGHT: 69 IN | DIASTOLIC BLOOD PRESSURE: 75 MMHG

## 2019-11-13 DIAGNOSIS — M06.00 SERONEGATIVE RHEUMATOID ARTHRITIS: Primary | ICD-10-CM

## 2019-11-13 DIAGNOSIS — M54.32 SCIATICA OF LEFT SIDE: ICD-10-CM

## 2019-11-13 DIAGNOSIS — Z79.899 HIGH RISK MEDICATION USE: Chronic | ICD-10-CM

## 2019-11-13 DIAGNOSIS — R51.9 GENERALIZED HEADACHES: ICD-10-CM

## 2019-11-13 DIAGNOSIS — M79.7 FIBROMYALGIA: Chronic | ICD-10-CM

## 2019-11-13 PROCEDURE — 99214 OFFICE O/P EST MOD 30 MIN: CPT | Mod: S$PBB,,, | Performed by: INTERNAL MEDICINE

## 2019-11-13 PROCEDURE — 99999 PR PBB SHADOW E&M-EST. PATIENT-LVL III: ICD-10-PCS | Mod: PBBFAC,,, | Performed by: INTERNAL MEDICINE

## 2019-11-13 PROCEDURE — 99213 OFFICE O/P EST LOW 20 MIN: CPT | Mod: PBBFAC | Performed by: INTERNAL MEDICINE

## 2019-11-13 PROCEDURE — 99214 PR OFFICE/OUTPT VISIT, EST, LEVL IV, 30-39 MIN: ICD-10-PCS | Mod: S$PBB,,, | Performed by: INTERNAL MEDICINE

## 2019-11-13 PROCEDURE — 99999 PR PBB SHADOW E&M-EST. PATIENT-LVL III: CPT | Mod: PBBFAC,,, | Performed by: INTERNAL MEDICINE

## 2019-11-13 RX ORDER — METHOCARBAMOL 750 MG/1
750 TABLET, FILM COATED ORAL 2 TIMES DAILY PRN
Qty: 60 TABLET | Refills: 2 | Status: SHIPPED | OUTPATIENT
Start: 2019-11-13 | End: 2019-11-18

## 2019-11-13 RX ORDER — MELOXICAM 7.5 MG/1
7.5-15 TABLET ORAL DAILY PRN
Qty: 180 TABLET | Refills: 1 | Status: SHIPPED | OUTPATIENT
Start: 2019-11-13 | End: 2020-06-02 | Stop reason: SDUPTHER

## 2019-11-13 RX ORDER — HYDROCODONE BITARTRATE AND ACETAMINOPHEN 5; 325 MG/1; MG/1
1 TABLET ORAL EVERY 8 HOURS PRN
Qty: 80 TABLET | Refills: 0 | Status: SHIPPED | OUTPATIENT
Start: 2019-11-13 | End: 2019-12-11 | Stop reason: SDUPTHER

## 2019-11-13 ASSESSMENT — ROUTINE ASSESSMENT OF PATIENT INDEX DATA (RAPID3)
MDHAQ FUNCTION SCORE: .6
PSYCHOLOGICAL DISTRESS SCORE: 3.3
WHEN YOU AWAKENED IN THE MORNING OVER THE LAST WEEK, PLEASE INDICATE THE AMOUNT OF TIME IT TAKES UNTIL YOU ARE AS LIMBER AS YOU WILL BE FOR THE DAY: 30MIN
AM STIFFNESS SCORE: 1, YES
PAIN SCORE: 5.5
PATIENT GLOBAL ASSESSMENT SCORE: 3.5
FATIGUE SCORE: 6.5
TOTAL RAPID3 SCORE: 3.67

## 2019-11-13 NOTE — PROGRESS NOTES
"Subjective:       Patient ID: Angela Carlson is a 54 y.o. female.    Chief Complaint: Follow-up and Disease Management    HPI   F/u seroneg RA, FMS  RA since ~2004; Dr Cabrera 2004-5, then Dr Camacho until 2012, me since 2013  hx MTX and HCQ 2004-5   Enbrel ~2010, stopped when developed neutropenia; sx remitted 2013-15, then recurrent synovitis  HCQ 6816-3427  Enbrel restarted March 2017     FMS and migraine HA: Dx before 2013; robaxin and doxepin; nortriptyline added 2014; hydrocodone since 2014 after failed tramadol     Current meds:  Enbrel 50 weekly  Meloxicam 7.5 mg BID  Nortriptyline 75  Hydrocodone : has been using 85 tab/ 90 days  Robaxin, helps her with back pain ; usually takes afternoon or night ; "it's my savior"    Enbrel still working  Not much stiffness   No joint deformity    No GI c/o on meloxicam  Nortriptyline helps sleep    Still spends time at [place] [employer]  Sits a lot but also walks    Review of Systems   Constitutional: Positive for fatigue and unexpected weight change. Negative for fever.   HENT: Negative for trouble swallowing.    Eyes: Negative for redness.   Respiratory: Positive for cough. Negative for shortness of breath.    Cardiovascular: Negative for chest pain.   Gastrointestinal: Negative for constipation and diarrhea.   Genitourinary: Negative for dysuria and genital sores.   Skin: Negative for rash.   Neurological: Positive for headaches.   Hematological: Does not bruise/bleed easily.         Objective:   /75   Pulse 79   Ht 5' 9" (1.753 m)   Wt 82.5 kg (181 lb 14.1 oz)   BMI 26.86 kg/m²      Physical Exam   Constitutional: She is well-developed, well-nourished, and in no distress.   HENT:   Mouth/Throat: Oropharynx is clear and moist.   Eyes: Conjunctivae are normal.   Cardiovascular: Normal rate and regular rhythm.    Pulmonary/Chest: She has no wheezes. She has no rales.   Lymphadenopathy:     She has no cervical adenopathy.   Skin: No rash noted.           11/13/2019 "   Tender (YAN-28) 0 / 28    Swollen (YAN-28) 0 / 28    Provider Global Assessment 10 (mm)   Patient Global Assessment 35 (mm)   ESR 7 (mm/hr)   CRP 2.7 (mg/L)   YAN-28 (ESR) 1.85 (Remission)   YAN-28 (CRP) 1.92 (Remission)     Lab Results   Component Value Date    SEDRATE 7 10/15/2019     Assessment:       1. Seronegative rheumatoid arthritis    2. Fibromyalgia    3. Generalized headaches    4. Sciatica of left side    5. High risk medication use            Plan:       Problem List Items Addressed This Visit        Active Problems    High risk medication use (Chronic)     No interval infections on Enbrel  Allergic to egg and cannot take flu shot         Fibromyalgia (Chronic)     Moderate daily sx for which she is currently very functional on robaxin and hydrocodone and nortriptyline regimen    Will cont current meds  Decrease hydrocodone to 80/90 d         Relevant Medications    methocarbamol (ROBAXIN) 750 MG Tab    Seronegative rheumatoid arthritis - Primary     Currently controlled on Enbrel  Has remission YAN   No AE to Enbrel  Will cont same         Relevant Medications    HYDROcodone-acetaminophen (NORCO) 5-325 mg per tablet    meloxicam (MOBIC) 7.5 MG tablet    Generalized headaches      Other Visit Diagnoses     Sciatica of left side              RTC 6 mo with lab for monitoring     Rapid3 Question Responses and Scores 11/6/2019   MDHAQ Score 0.7   Psychologic Score 1.1   Pain Score 4   When you awakened in the morning OVER THE LAST WEEK, did you feel stiff? Yes   If Yes, please indicate the number of hours until you are as limber as you will be for the day 1   Fatigue Score 1   Global Health Score 1   RAPID3 Score 2.44

## 2019-11-14 ENCOUNTER — TELEPHONE (OUTPATIENT)
Dept: PHARMACY | Facility: CLINIC | Age: 54
End: 2019-11-14

## 2019-11-16 DIAGNOSIS — M79.7 FIBROMYALGIA: Chronic | ICD-10-CM

## 2019-11-17 DIAGNOSIS — D51.8 OTHER VITAMIN B12 DEFICIENCY ANEMIA: ICD-10-CM

## 2019-11-18 RX ORDER — CYANOCOBALAMIN 1000 UG/ML
INJECTION, SOLUTION INTRAMUSCULAR; SUBCUTANEOUS
Qty: 12 ML | Refills: 0 | Status: SHIPPED | OUTPATIENT
Start: 2019-11-18 | End: 2020-01-14

## 2019-11-18 RX ORDER — METHOCARBAMOL 750 MG/1
TABLET, FILM COATED ORAL
Qty: 60 TABLET | Refills: 2 | Status: SHIPPED | OUTPATIENT
Start: 2019-11-18 | End: 2020-06-02 | Stop reason: SDUPTHER

## 2019-12-11 ENCOUNTER — PATIENT MESSAGE (OUTPATIENT)
Dept: RHEUMATOLOGY | Facility: CLINIC | Age: 54
End: 2019-12-11

## 2019-12-11 DIAGNOSIS — M06.00 SERONEGATIVE RHEUMATOID ARTHRITIS: ICD-10-CM

## 2019-12-11 RX ORDER — HYDROCODONE BITARTRATE AND ACETAMINOPHEN 5; 325 MG/1; MG/1
1 TABLET ORAL EVERY 8 HOURS PRN
Qty: 80 TABLET | Refills: 0 | Status: SHIPPED | OUTPATIENT
Start: 2019-12-12 | End: 2020-01-10 | Stop reason: SDUPTHER

## 2019-12-12 ENCOUNTER — TELEPHONE (OUTPATIENT)
Dept: PHARMACY | Facility: CLINIC | Age: 54
End: 2019-12-12

## 2020-01-10 ENCOUNTER — PATIENT MESSAGE (OUTPATIENT)
Dept: RHEUMATOLOGY | Facility: CLINIC | Age: 55
End: 2020-01-10

## 2020-01-10 DIAGNOSIS — M06.00 SERONEGATIVE RHEUMATOID ARTHRITIS: ICD-10-CM

## 2020-01-10 RX ORDER — HYDROCODONE BITARTRATE AND ACETAMINOPHEN 5; 325 MG/1; MG/1
1 TABLET ORAL EVERY 8 HOURS PRN
Qty: 80 TABLET | Refills: 0 | Status: SHIPPED | OUTPATIENT
Start: 2020-01-10 | End: 2020-02-12 | Stop reason: SDUPTHER

## 2020-01-14 ENCOUNTER — PATIENT MESSAGE (OUTPATIENT)
Dept: NEUROLOGY | Facility: CLINIC | Age: 55
End: 2020-01-14

## 2020-01-14 DIAGNOSIS — D51.8 OTHER VITAMIN B12 DEFICIENCY ANEMIA: ICD-10-CM

## 2020-01-14 DIAGNOSIS — M06.09 RHEUMATOID ARTHRITIS OF MULTIPLE SITES WITH NEGATIVE RHEUMATOID FACTOR: ICD-10-CM

## 2020-01-14 RX ORDER — CYANOCOBALAMIN 1000 UG/ML
INJECTION, SOLUTION INTRAMUSCULAR; SUBCUTANEOUS
Qty: 12 ML | Refills: 0 | Status: SHIPPED | OUTPATIENT
Start: 2020-01-14 | End: 2020-06-29

## 2020-02-12 ENCOUNTER — PATIENT MESSAGE (OUTPATIENT)
Dept: RHEUMATOLOGY | Facility: CLINIC | Age: 55
End: 2020-02-12

## 2020-02-12 DIAGNOSIS — M06.00 SERONEGATIVE RHEUMATOID ARTHRITIS: ICD-10-CM

## 2020-02-12 RX ORDER — HYDROCODONE BITARTRATE AND ACETAMINOPHEN 5; 325 MG/1; MG/1
1 TABLET ORAL EVERY 8 HOURS PRN
Qty: 80 TABLET | Refills: 0 | Status: SHIPPED | OUTPATIENT
Start: 2020-02-12 | End: 2020-03-12 | Stop reason: SDUPTHER

## 2020-02-14 ENCOUNTER — TELEPHONE (OUTPATIENT)
Dept: PHARMACY | Facility: CLINIC | Age: 55
End: 2020-02-14

## 2020-02-20 RX ORDER — HYDROXYZINE HYDROCHLORIDE 50 MG/1
50 TABLET, FILM COATED ORAL 2 TIMES DAILY PRN
Qty: 60 TABLET | Refills: 5 | Status: SHIPPED | OUTPATIENT
Start: 2020-02-20 | End: 2020-07-28 | Stop reason: SDUPTHER

## 2020-03-11 ENCOUNTER — TELEPHONE (OUTPATIENT)
Dept: PHARMACY | Facility: CLINIC | Age: 55
End: 2020-03-11

## 2020-03-12 DIAGNOSIS — M06.00 SERONEGATIVE RHEUMATOID ARTHRITIS: ICD-10-CM

## 2020-03-12 RX ORDER — HYDROCODONE BITARTRATE AND ACETAMINOPHEN 5; 325 MG/1; MG/1
1 TABLET ORAL EVERY 8 HOURS PRN
Qty: 80 TABLET | Refills: 0 | Status: SHIPPED | OUTPATIENT
Start: 2020-03-12 | End: 2020-04-13 | Stop reason: SDUPTHER

## 2020-04-09 DIAGNOSIS — M06.09 RHEUMATOID ARTHRITIS OF MULTIPLE SITES WITH NEGATIVE RHEUMATOID FACTOR: ICD-10-CM

## 2020-04-13 ENCOUNTER — TELEPHONE (OUTPATIENT)
Dept: PHARMACY | Facility: CLINIC | Age: 55
End: 2020-04-13

## 2020-04-13 DIAGNOSIS — M06.00 SERONEGATIVE RHEUMATOID ARTHRITIS: ICD-10-CM

## 2020-04-13 RX ORDER — HYDROCODONE BITARTRATE AND ACETAMINOPHEN 5; 325 MG/1; MG/1
1 TABLET ORAL EVERY 8 HOURS PRN
Qty: 80 TABLET | Refills: 0 | Status: SHIPPED | OUTPATIENT
Start: 2020-04-13 | End: 2020-05-11 | Stop reason: SDUPTHER

## 2020-04-30 ENCOUNTER — PATIENT MESSAGE (OUTPATIENT)
Dept: RHEUMATOLOGY | Facility: CLINIC | Age: 55
End: 2020-04-30

## 2020-05-08 DIAGNOSIS — M06.09 RHEUMATOID ARTHRITIS OF MULTIPLE SITES WITH NEGATIVE RHEUMATOID FACTOR: ICD-10-CM

## 2020-05-11 DIAGNOSIS — M06.09 RHEUMATOID ARTHRITIS OF MULTIPLE SITES WITH NEGATIVE RHEUMATOID FACTOR: ICD-10-CM

## 2020-05-12 ENCOUNTER — TELEPHONE (OUTPATIENT)
Dept: PHARMACY | Facility: CLINIC | Age: 55
End: 2020-05-12

## 2020-05-18 ENCOUNTER — PATIENT MESSAGE (OUTPATIENT)
Dept: RHEUMATOLOGY | Facility: CLINIC | Age: 55
End: 2020-05-18

## 2020-05-22 ENCOUNTER — PATIENT MESSAGE (OUTPATIENT)
Dept: RHEUMATOLOGY | Facility: CLINIC | Age: 55
End: 2020-05-22

## 2020-06-02 ENCOUNTER — OFFICE VISIT (OUTPATIENT)
Dept: RHEUMATOLOGY | Facility: CLINIC | Age: 55
End: 2020-06-02
Payer: MEDICAID

## 2020-06-02 DIAGNOSIS — M47.817 SPONDYLOSIS OF LUMBOSACRAL REGION WITHOUT MYELOPATHY OR RADICULOPATHY: ICD-10-CM

## 2020-06-02 DIAGNOSIS — Z79.899 HIGH RISK MEDICATION USE: Chronic | ICD-10-CM

## 2020-06-02 DIAGNOSIS — M79.7 FIBROMYALGIA: Chronic | ICD-10-CM

## 2020-06-02 DIAGNOSIS — M06.00 SERONEGATIVE RHEUMATOID ARTHRITIS: ICD-10-CM

## 2020-06-02 PROCEDURE — 99214 OFFICE O/P EST MOD 30 MIN: CPT | Mod: 95,,, | Performed by: INTERNAL MEDICINE

## 2020-06-02 PROCEDURE — 99214 PR OFFICE/OUTPT VISIT, EST, LEVL IV, 30-39 MIN: ICD-10-PCS | Mod: 95,,, | Performed by: INTERNAL MEDICINE

## 2020-06-02 RX ORDER — MELOXICAM 7.5 MG/1
7.5-15 TABLET ORAL DAILY PRN
Qty: 180 TABLET | Refills: 1 | Status: SHIPPED | OUTPATIENT
Start: 2020-06-02 | End: 2020-11-25

## 2020-06-02 RX ORDER — HYDROCODONE BITARTRATE AND ACETAMINOPHEN 5; 325 MG/1; MG/1
1 TABLET ORAL EVERY 8 HOURS PRN
Qty: 80 TABLET | Refills: 0 | Status: SHIPPED | OUTPATIENT
Start: 2020-06-11 | End: 2020-07-10 | Stop reason: SDUPTHER

## 2020-06-02 RX ORDER — METHOCARBAMOL 750 MG/1
TABLET, FILM COATED ORAL
Qty: 60 TABLET | Refills: 2 | Status: SHIPPED | OUTPATIENT
Start: 2020-06-02 | End: 2020-06-08

## 2020-06-02 ASSESSMENT — ROUTINE ASSESSMENT OF PATIENT INDEX DATA (RAPID3)
PAIN SCORE: 4
TOTAL RAPID3 SCORE: 2.89
PATIENT GLOBAL ASSESSMENT SCORE: 3
MDHAQ FUNCTION SCORE: .5
PSYCHOLOGICAL DISTRESS SCORE: 1.1
FATIGUE SCORE: 7.5

## 2020-06-02 NOTE — PROGRESS NOTES
"Subjective:       Patient ID: Angela Carlson is a 55 y.o. female.    Chief Complaint: Disease Management    F/u seroneg RA, FMS  RA since ~2004; Dr Cabrera 2004-5, then Dr Camacho until 2012, me since 2013  hx MTX and HCQ 2004-5   Enbrel ~2010, stopped when developed neutropenia; sx remitted 2013-15, then recurrent synovitis  HCQ 6829-6610  Enbrel restarted March 2017     FMS and migraine HA: Dx before 2013; robaxin and doxepin; nortriptyline added 2014; hydrocodone since 2014 after failed tramadol     Current meds:  Enbrel 50 weekly  Meloxicam 7.5 mg BID  Nortriptyline 75  Hydrocodone : has been using 85 tab/ 90 days  Robaxin, helps her with back pain ; usually takes afternoon or night ; "it's my savior"          The patient location is: home  The chief complaint leading to consultation is: disease management    Visit type: audiovisual    Face to Face time with patient: 11:10 -  11:19  15 minutes of total time spent on the encounter, which includes face to face time and non-face to face time preparing to see the patient (eg, review of tests), Obtaining and/or reviewing separately obtained history, Documenting clinical information in the electronic or other health record, Independently interpreting results (not separately reported) and communicating results to the patient/family/caregiver, or Care coordination (not separately reported).         Each patient to whom he or she provides medical services by telemedicine is:  (1) informed of the relationship between the physician and patient and the respective role of any other health care provider with respect to management of the patient; and (2) notified that he or she may decline to receive medical services by telemedicine and may withdraw from such care at any time.    Notes:    Has been ok   No covid in family  Has been at home but will start going to [employer] 2 weekends ago  Will start walking more    Stays on schedule with hydrocodone 80 tab / month    Review of " Systems   Constitutional: Negative for fever and unexpected weight change.   HENT: Negative for trouble swallowing.    Eyes: Negative for redness.   Respiratory: Negative for cough and shortness of breath.    Cardiovascular: Negative for chest pain.   Gastrointestinal: Negative for constipation and diarrhea.   Genitourinary: Negative for dysuria.   Skin: Negative for rash.   Neurological: Negative for headaches.   Hematological: Does not bruise/bleed easily.         Objective:   There were no vitals taken for this visit.     Physical Exam   Constitutional: She is well-developed, well-nourished, and in no distress.   Pulmonary/Chest: No respiratory distress.   No increased work of breathing   Neurological:   Alert, awake, with no abnormal movements   Skin:     No rash on face; skin clear   Psychiatric:   Normal mood and affect; clear, rational thinking; speech normal and not pressured         Lab Results   Component Value Date    CREATININE 0.8 05/12/2020      Assessment:       1. Fibromyalgia    2. Seronegative rheumatoid arthritis    3. High risk medication use    4. Spondylosis of lumbosacral region without myelopathy or radiculopathy            Plan:       Problem List Items Addressed This Visit        Active Problems    Seronegative rheumatoid arthritis     RA stable on Enbrel per patient    Will cont same and schedule lab and RTC 3 mo         Relevant Medications    meloxicam (MOBIC) 7.5 MG tablet    High risk medication use (Chronic)     No interval infections on Enbrel  No other AE to NSAID or narcotic  Plan lab in 3 mo         Fibromyalgia (Chronic)     Stable sx ; walking          Relevant Medications    methocarbamoL (ROBAXIN) 750 MG Tab    Spondylosis of lumbosacral region without myelopathy or radiculopathy    Relevant Medications    HYDROcodone-acetaminophen (NORCO) 5-325 mg per tablet (Start on 6/11/2020)    meloxicam (MOBIC) 7.5 MG tablet            Rapid3 Question Responses and Scores 5/31/2020    MDHAQ Score 0.5   Psychologic Score 1.1   Pain Score 4   When you awakened in the morning OVER THE LAST WEEK, did you feel stiff? Yes   If Yes, please indicate the number of hours until you are as limber as you will be for the day 35   Fatigue Score 7.5   Global Health Score 3   RAPID3 Score 2.88

## 2020-06-05 DIAGNOSIS — M06.09 RHEUMATOID ARTHRITIS OF MULTIPLE SITES WITH NEGATIVE RHEUMATOID FACTOR: ICD-10-CM

## 2020-06-08 ENCOUNTER — TELEPHONE (OUTPATIENT)
Dept: PHARMACY | Facility: CLINIC | Age: 55
End: 2020-06-08

## 2020-06-08 DIAGNOSIS — M79.7 FIBROMYALGIA: Chronic | ICD-10-CM

## 2020-06-08 DIAGNOSIS — M47.817 SPONDYLOSIS OF LUMBOSACRAL REGION WITHOUT MYELOPATHY OR RADICULOPATHY: ICD-10-CM

## 2020-06-08 RX ORDER — METHOCARBAMOL 750 MG/1
TABLET, FILM COATED ORAL
Qty: 60 TABLET | Refills: 0 | Status: SHIPPED | OUTPATIENT
Start: 2020-06-08 | End: 2020-07-03

## 2020-06-10 RX ORDER — HYDROCODONE BITARTRATE AND ACETAMINOPHEN 5; 325 MG/1; MG/1
1 TABLET ORAL EVERY 8 HOURS PRN
Qty: 80 TABLET | Refills: 0 | OUTPATIENT
Start: 2020-06-11 | End: 2020-07-11

## 2020-06-10 NOTE — TELEPHONE ENCOUNTER
Staff to inform patient that Dr. Dhillon fill this prescription on June 2nd and a can be picked up on June 11th

## 2020-06-12 DIAGNOSIS — M47.817 SPONDYLOSIS OF LUMBOSACRAL REGION WITHOUT MYELOPATHY OR RADICULOPATHY: ICD-10-CM

## 2020-06-12 RX ORDER — HYDROCODONE BITARTRATE AND ACETAMINOPHEN 5; 325 MG/1; MG/1
1 TABLET ORAL EVERY 8 HOURS PRN
Qty: 80 TABLET | Refills: 0 | OUTPATIENT
Start: 2020-06-12 | End: 2020-07-12

## 2020-07-02 ENCOUNTER — OFFICE VISIT (OUTPATIENT)
Dept: NEUROLOGY | Facility: CLINIC | Age: 55
End: 2020-07-02
Payer: MEDICAID

## 2020-07-02 ENCOUNTER — TELEPHONE (OUTPATIENT)
Dept: PHARMACY | Facility: CLINIC | Age: 55
End: 2020-07-02

## 2020-07-02 VITALS
HEART RATE: 68 BPM | DIASTOLIC BLOOD PRESSURE: 94 MMHG | SYSTOLIC BLOOD PRESSURE: 146 MMHG | BODY MASS INDEX: 26.06 KG/M2 | RESPIRATION RATE: 16 BRPM | WEIGHT: 175.94 LBS | TEMPERATURE: 98 F | HEIGHT: 69 IN

## 2020-07-02 DIAGNOSIS — M79.7 FIBROMYALGIA: Chronic | ICD-10-CM

## 2020-07-02 DIAGNOSIS — G47.33 OSA (OBSTRUCTIVE SLEEP APNEA): ICD-10-CM

## 2020-07-02 DIAGNOSIS — E03.4 HYPOTHYROIDISM DUE TO ACQUIRED ATROPHY OF THYROID: Chronic | ICD-10-CM

## 2020-07-02 DIAGNOSIS — I10 ESSENTIAL HYPERTENSION: Chronic | ICD-10-CM

## 2020-07-02 DIAGNOSIS — R51.9 GENERALIZED HEADACHES: Primary | ICD-10-CM

## 2020-07-02 DIAGNOSIS — E78.5 HYPERLIPIDEMIA, UNSPECIFIED HYPERLIPIDEMIA TYPE: ICD-10-CM

## 2020-07-02 PROCEDURE — 99999 PR PBB SHADOW E&M-EST. PATIENT-LVL IV: CPT | Mod: PBBFAC,,, | Performed by: NURSE PRACTITIONER

## 2020-07-02 PROCEDURE — 99999 PR PBB SHADOW E&M-EST. PATIENT-LVL IV: ICD-10-PCS | Mod: PBBFAC,,, | Performed by: NURSE PRACTITIONER

## 2020-07-02 PROCEDURE — 99214 OFFICE O/P EST MOD 30 MIN: CPT | Mod: PBBFAC | Performed by: NURSE PRACTITIONER

## 2020-07-02 PROCEDURE — 99214 PR OFFICE/OUTPT VISIT, EST, LEVL IV, 30-39 MIN: ICD-10-PCS | Mod: S$PBB,,, | Performed by: NURSE PRACTITIONER

## 2020-07-02 PROCEDURE — 99214 OFFICE O/P EST MOD 30 MIN: CPT | Mod: S$PBB,,, | Performed by: NURSE PRACTITIONER

## 2020-07-02 RX ORDER — PROMETHAZINE HYDROCHLORIDE 25 MG/1
25 TABLET ORAL EVERY 6 HOURS PRN
Qty: 15 TABLET | Refills: 5 | Status: SHIPPED | OUTPATIENT
Start: 2020-07-02 | End: 2020-11-02 | Stop reason: SDUPTHER

## 2020-07-02 RX ORDER — NORTRIPTYLINE HYDROCHLORIDE 75 MG/1
150 CAPSULE ORAL NIGHTLY
Qty: 60 CAPSULE | Refills: 11 | Status: SHIPPED | OUTPATIENT
Start: 2020-07-02 | End: 2020-09-29 | Stop reason: SDUPTHER

## 2020-07-02 NOTE — PROGRESS NOTES
HPI: Angela Carlson is a 55 y.o. female with migraine with aura and with  cervicogenic trigger possibly, although MRI C spine with only minor changes. Some complicated migraine with some alteration of consciousness at times-resolved. Anxiety and depression. She has TREY. Symptoms of lumbar radicular pain down the left leg with MRI showing disc bulge and nerve root displacement at S1 on the left 2015. She has HTN and hypothyroidism, as well as fibromyalgia. She sees Rheumatology for seronegative RA.     She is here today for her annual follow up.    She uses CPAP for her TREY. She is in need of new supplies. She has not had a titration study in several years, but her last study was done at [place].     Her headaches are well controlled, and only rare. She takes Pamelor.     She is also on Propranolol for her HTN from Endocrinology, whom she sees for her thyroid issues.     Her back pain is well controlled. Fibro and RA pain currently well controlled on current treatment. She sees Rheumatology at [facility].     She is rarely using Atarax as needed and same with phenergan (not daily).     BP is elevated today. She took her medication just prior to her appointment.     Review of Systems   Constitutional: Negative for malaise/fatigue.   Eyes: Negative for blurred vision and double vision.   Cardiovascular: Negative for palpitations and leg swelling.   Musculoskeletal: Positive for myalgias. Negative for falls, joint pain and neck pain.   Skin: Negative for rash.   Neurological: Positive for headaches. Negative for dizziness, tingling, sensory change and weakness.   Endo/Heme/Allergies: Does not bruise/bleed easily.   Psychiatric/Behavioral: Negative for depression and memory loss. The patient is not nervous/anxious and does not have insomnia.      Exam:     Gen Appearance, well developed/nourished in no apparent distress  CV: 2+ distal pulses with no edema or swelling  Neuro:  MS: Awake, alert, Sustains attention.  Recent/remote memory intact, Language is full to spontaneous speech and comprehension.  Fund of Knowledge is full  CN: Optic discs are flat with normal vasculature, PERRL, Extraoccular movements and visual fields are full. Normal facial sensation and strength, Hearing symmetric, Tongue and Palate are midline and strong. Shoulder Shrug symmetric and strong.  Motor: Normal bulk, tone, no abnormal movements. 5/5 strength bilateral upper/lower extremities with 2+ reflexes  Sensory: Romberg negative and intact to temp, vibration  Cerebellar: Finger-nose, Rapid alternating movements intact  Gait: Normal stance, no ataxia    Labs:   CBC normal in 2/2017  2018 B12 just above the normal range  2019 CBC reviewed    Assessment/Recommendation: Angela Carlson is a 55 y.o. female with migraine with aura and with  cervicogenic trigger possibly, although MRI C spine with only minor changes. Some complicated migraine with some alteration of consciousness at times-resolved. Anxiety and depression. She has  TREY.   Symptoms of lumbar radicular pain down the left leg with MRI showing disc bulge and nerve root displacement at S1 on the left 2015.     I recommend:   1. Saw spine surgery prior-conservative measures recommended. Back pain is currently episodic and responds to rest.   2. Regarding Fibromyalgia and headache prevention: She is using Pamelor  (higher dose per rheumatology prior) for prevention of pain.   -Off of Gabapentin now, due to nausea, which resolved after cessation.   -She tried Lyrica and Zonegran prior and she is off of Cymbalta now  3. Can continue Atarax PRN ( don't use atarax with Phenergan) for anxiety. Phenergan for nausea associated with headache- rare use only recommended. Watch for sedation with meds.  - Rheumatology also managing hydrocodone for pain with RA.   4. Continue CPAP for TREY. Order CPAP titration study to order updated supplies, as last study was several years ago.   5. Prior spells resolved/  could have been complicated migraine. Headaches are greatly improved.   6. Continue B12 monthly for prior deficiency- level good in 2018. Endocrinology is now ordering this.     RTC 1 year

## 2020-07-10 DIAGNOSIS — M47.817 SPONDYLOSIS OF LUMBOSACRAL REGION WITHOUT MYELOPATHY OR RADICULOPATHY: ICD-10-CM

## 2020-07-10 RX ORDER — HYDROCODONE BITARTRATE AND ACETAMINOPHEN 5; 325 MG/1; MG/1
1 TABLET ORAL EVERY 8 HOURS PRN
Qty: 80 TABLET | Refills: 0 | Status: SHIPPED | OUTPATIENT
Start: 2020-07-10 | End: 2020-08-10 | Stop reason: SDUPTHER

## 2020-07-13 NOTE — TELEPHONE ENCOUNTER
Pt confirmed shipping of Enbrel on  to arrive on . Address and  verified. $0 copay in 004. Pt is not in need of a new sharps container. Pt has 0 doses on hand, next dose due . Pt reported no missed doses. Pt did not start any new medications. Pt had no further questions or concerns.

## 2020-07-28 RX ORDER — HYDROXYZINE HYDROCHLORIDE 50 MG/1
50 TABLET, FILM COATED ORAL 2 TIMES DAILY PRN
Qty: 60 TABLET | Refills: 5 | Status: SHIPPED | OUTPATIENT
Start: 2020-07-28 | End: 2021-02-15

## 2020-08-05 ENCOUNTER — HOSPITAL ENCOUNTER (EMERGENCY)
Facility: HOSPITAL | Age: 55
Discharge: HOME | End: 2020-08-05
Attending: SURGERY
Payer: MEDICAID

## 2020-08-05 VITALS
HEART RATE: 84 BPM | TEMPERATURE: 97 F | WEIGHT: 175.13 LBS | RESPIRATION RATE: 18 BRPM | BODY MASS INDEX: 25.87 KG/M2 | DIASTOLIC BLOOD PRESSURE: 58 MMHG | SYSTOLIC BLOOD PRESSURE: 129 MMHG
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
## 2020-08-05 DIAGNOSIS — T14.8XXA ANIMAL BITE: ICD-10-CM

## 2020-08-05 PROCEDURE — 25000003 PHARM REV CODE 250: Performed by: SURGERY

## 2020-08-05 PROCEDURE — 90715 TDAP VACCINE 7 YRS/> IM: CPT | Mod: SL | Performed by: SURGERY

## 2020-08-05 PROCEDURE — 63600175 PHARM REV CODE 636 W HCPCS: Mod: SL | Performed by: SURGERY

## 2020-08-05 PROCEDURE — 99284 EMERGENCY DEPT VISIT MOD MDM: CPT | Mod: 25

## 2020-08-05 PROCEDURE — 90471 IMMUNIZATION ADMIN: CPT | Mod: VFC | Performed by: SURGERY

## 2020-08-05 RX ORDER — CLINDAMYCIN HYDROCHLORIDE 300 MG/1
300 CAPSULE ORAL EVERY 8 HOURS
Qty: 21 CAPSULE | Refills: 0 | Status: SHIPPED | OUTPATIENT
Start: 2020-08-05 | End: 2020-08-12

## 2020-08-05 RX ORDER — CLINDAMYCIN HYDROCHLORIDE 150 MG/1
300 CAPSULE ORAL
Status: COMPLETED | OUTPATIENT
Start: 2020-08-05 | End: 2020-08-05

## 2020-08-05 RX ADMIN — CLINDAMYCIN HYDROCHLORIDE 300 MG: 150 CAPSULE ORAL at 10:08

## 2020-08-05 RX ADMIN — CLOSTRIDIUM TETANI TOXOID ANTIGEN (FORMALDEHYDE INACTIVATED), CORYNEBACTERIUM DIPHTHERIAE TOXOID ANTIGEN (FORMALDEHYDE INACTIVATED), BORDETELLA PERTUSSIS TOXOID ANTIGEN (GLUTARALDEHYDE INACTIVATED), BORDETELLA PERTUSSIS FILAMENTOUS HEMAGGLUTININ ANTIGEN (FORMALDEHYDE INACTIVATED), BORDETELLA PERTUSSIS PERTACTIN ANTIGEN, AND BORDETELLA PERTUSSIS FIMBRIAE 2/3 ANTIGEN 0.5 ML: 5; 2; 2.5; 5; 3; 5 INJECTION, SUSPENSION INTRAMUSCULAR at 10:08

## 2020-08-05 NOTE — ED PROVIDER NOTES
Encounter Date: 2020       History     Chief Complaint   Patient presents with    Animal Bite     Patient is 55-year-old  female who was a attempting to extricate a road it from the mouth strep this morning when she suffered a small superficial bite to the right index finger.  She has no loss of function noted and no appreciable swelling in the finger.        Review of patient's allergies indicates:  No Known Allergies  Past Medical History:   Diagnosis Date    Anemia     Anxiety     Arthritis     Depression     Fibromyalgia 2013    Headache     Heart murmur     mitral valve prolapse    Hypertension     Lupus     Neutropenia associated with autoimmune disease 2013    Sciatica     Thyroid disease      Past Surgical History:   Procedure Laterality Date     SECTION, CLASSIC      COLONOSCOPY      HYSTERECTOMY      KNEE SURGERY      SOFT PALATE REVISION      TONSILLECTOMY       Family History   Problem Relation Age of Onset    Diabetes Mother     Lupus Father     Diabetes Maternal Aunt     Diabetes Maternal Grandmother     Stroke Maternal Grandfather     Cancer Paternal Aunt         brain cancer    Lupus Paternal Aunt      Social History     Tobacco Use    Smoking status: Never Smoker    Smokeless tobacco: Never Used   Substance Use Topics    Alcohol use: No    Drug use: No     Review of Systems   Constitutional: Negative for fever.   HENT: Negative for congestion, ear pain, rhinorrhea, sore throat and trouble swallowing.    Eyes: Negative for pain.   Respiratory: Negative for cough, shortness of breath and wheezing.    Cardiovascular: Negative for chest pain, palpitations and leg swelling.   Gastrointestinal: Negative for abdominal pain, constipation, diarrhea and nausea.   Genitourinary: Negative for difficulty urinating, dysuria, flank pain, frequency, hematuria and urgency.   Musculoskeletal: Negative for arthralgias, back pain, myalgias and neck pain.    Skin: Positive for wound. Negative for rash.   Neurological: Negative for speech difficulty, weakness and headaches.   Hematological: Does not bruise/bleed easily.       Physical Exam     Initial Vitals [08/05/20 0945]   BP Pulse Resp Temp SpO2   (!) 129/58 84 18 97 °F (36.1 °C) --      MAP       --         Physical Exam    Nursing note and vitals reviewed.  Constitutional: No distress.   HENT:   Head: Normocephalic and atraumatic.   Nose: Nose normal.   Mouth/Throat: Oropharynx is clear and moist.   Eyes: Conjunctivae and EOM are normal. Pupils are equal, round, and reactive to light.   Neck: Normal range of motion. Neck supple.   Cardiovascular: Normal rate, regular rhythm, normal heart sounds and intact distal pulses.   Pulmonary/Chest: Breath sounds normal.   Abdominal: Soft. Bowel sounds are normal. She exhibits no distension. There is no abdominal tenderness. There is no rebound.   Musculoskeletal: Normal range of motion.   Neurological: She is alert and oriented to person, place, and time. She has normal strength.   Skin: Skin is warm and dry.   Small bite [place] lateral aspect right index finger at proximal interphalangeal joint, no limitation of range of motion at the joint, no significant erythema.   Psychiatric: She has a normal mood and affect. Thought content normal.         ED Course   Procedures  Labs Reviewed - No data to display       Imaging Results    None                                          Clinical Impression:       ICD-10-CM ICD-9-CM   1. Non-rat rodent bite  W53.81XA 879.8     E906.5   2. Animal bite  T14.8XXA 879.8     E906.5         Disposition:   Disposition: Discharged  Condition: Stable                        Aj Carlos Jr., MD  08/05/20 1020

## 2020-08-05 NOTE — ED TRIAGE NOTES
WAS REMOVING A MOUSE FROM A MOUSE TRAP. THE MOUSE WAS STILL ALIVE AND NIPPED HER RIGHT MID PART OF FOREFINGER.  SUPERFICIAL REDNESS NOTED. NO BLEEDING.

## 2020-08-07 DIAGNOSIS — M06.09 RHEUMATOID ARTHRITIS OF MULTIPLE SITES WITH NEGATIVE RHEUMATOID FACTOR: ICD-10-CM

## 2020-08-10 ENCOUNTER — TELEPHONE (OUTPATIENT)
Dept: PHARMACY | Facility: CLINIC | Age: 55
End: 2020-08-10

## 2020-08-31 DIAGNOSIS — M06.09 RHEUMATOID ARTHRITIS OF MULTIPLE SITES WITH NEGATIVE RHEUMATOID FACTOR: ICD-10-CM

## 2020-09-02 DIAGNOSIS — Z79.899 HIGH RISK MEDICATION USE: Primary | Chronic | ICD-10-CM

## 2020-09-02 DIAGNOSIS — M06.09 RHEUMATOID ARTHRITIS OF MULTIPLE SITES WITH NEGATIVE RHEUMATOID FACTOR: ICD-10-CM

## 2020-09-03 ENCOUNTER — TELEPHONE (OUTPATIENT)
Dept: PHARMACY | Facility: CLINIC | Age: 55
End: 2020-09-03

## 2020-09-03 NOTE — TELEPHONE ENCOUNTER
DOCUMENTATION ONLY:  Enbrel renewal prior authorization approved x 1 year.   Dates: 9/1/20 through 9/1/21  Case ID: not provided

## 2020-09-04 ENCOUNTER — TELEPHONE (OUTPATIENT)
Dept: PHARMACY | Facility: CLINIC | Age: 55
End: 2020-09-04

## 2020-09-09 DIAGNOSIS — M47.817 SPONDYLOSIS OF LUMBOSACRAL REGION WITHOUT MYELOPATHY OR RADICULOPATHY: ICD-10-CM

## 2020-09-09 RX ORDER — HYDROCODONE BITARTRATE AND ACETAMINOPHEN 5; 325 MG/1; MG/1
1 TABLET ORAL EVERY 8 HOURS PRN
Qty: 80 TABLET | Refills: 0 | Status: SHIPPED | OUTPATIENT
Start: 2020-09-09 | End: 2020-10-07 | Stop reason: SDUPTHER

## 2020-09-15 DIAGNOSIS — Z03.818 ENCOUNTER FOR OBSERVATION FOR SUSPECTED EXPOSURE TO OTHER BIOLOGICAL AGENTS RULED OUT: ICD-10-CM

## 2020-09-29 RX ORDER — NORTRIPTYLINE HYDROCHLORIDE 75 MG/1
150 CAPSULE ORAL NIGHTLY
Qty: 60 CAPSULE | Refills: 11 | Status: SHIPPED | OUTPATIENT
Start: 2020-09-29 | End: 2021-10-05 | Stop reason: SDUPTHER

## 2020-10-08 ENCOUNTER — TELEPHONE (OUTPATIENT)
Dept: PHARMACY | Facility: CLINIC | Age: 55
End: 2020-10-08

## 2020-10-08 ENCOUNTER — PATIENT MESSAGE (OUTPATIENT)
Dept: PHARMACY | Facility: CLINIC | Age: 55
End: 2020-10-08

## 2020-10-10 ENCOUNTER — HOSPITAL ENCOUNTER (EMERGENCY)
Facility: HOSPITAL | Age: 55
Discharge: HOME | End: 2020-10-10
Attending: SURGERY
Payer: MEDICAID

## 2020-10-10 VITALS
BODY MASS INDEX: 26.18 KG/M2 | OXYGEN SATURATION: 97 % | DIASTOLIC BLOOD PRESSURE: 81 MMHG | RESPIRATION RATE: 20 BRPM | SYSTOLIC BLOOD PRESSURE: 138 MMHG | WEIGHT: 177.25 LBS | TEMPERATURE: 99 F | HEART RATE: 84 BPM
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
## 2020-10-10 DIAGNOSIS — R07.1 CHEST PAIN ON BREATHING: ICD-10-CM

## 2020-10-10 DIAGNOSIS — R07.81 RIB PAIN ON LEFT SIDE: ICD-10-CM
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
## 2020-10-10 PROCEDURE — 99284 EMERGENCY DEPT VISIT MOD MDM: CPT | Mod: 25

## 2020-10-10 PROCEDURE — 25000003 PHARM REV CODE 250: Performed by: SURGERY

## 2020-10-10 RX ORDER — DICLOFENAC SODIUM 75 MG/1
75 TABLET, DELAYED RELEASE ORAL 2 TIMES DAILY
Qty: 8 TABLET | Refills: 0 | Status: SHIPPED | OUTPATIENT
Start: 2020-10-10 | End: 2020-10-14

## 2020-10-10 RX ORDER — IBUPROFEN 800 MG/1
800 TABLET ORAL
Status: COMPLETED | OUTPATIENT
Start: 2020-10-10 | End: 2020-10-10

## 2020-10-10 RX ORDER — ACETAMINOPHEN 500 MG
1000 TABLET ORAL
Status: COMPLETED | OUTPATIENT
Start: 2020-10-10 | End: 2020-10-10

## 2020-10-10 RX ADMIN — ACETAMINOPHEN 1000 MG: 500 TABLET ORAL at 07:10

## 2020-10-10 RX ADMIN — IBUPROFEN 800 MG: 800 TABLET, FILM COATED ORAL at 07:10

## 2020-10-10 NOTE — ED PROVIDER NOTES
Encounter Date: 10/10/2020       History     Chief complaint is fall with left rib pain    HPI  Angela Carlson is a 55 y.o. female presents with left shoulder and left rib pain today  Patient had a slip and fall earlier this week with residual left shoulder and rib pain now  Patient had no head trauma or loss of consciousness, normal range of motion today  Patient has clear lung sounds in all fields with no signs    History is provided by the patient  Medical history significant for anemia, anxiety, arthritis, fibromyalgia, lupus, chronic pain  Surgeries significant for  section, colonoscopy, hysterectomy, knee, tonsils  Patient list allergy to influenza vaccine  Gives no significant family history today    Review of Systems and Physical Exam      Review of Systems   Constitutional: Negative.    HENT: Negative.    Eyes: Negative.    Respiratory: Negative.    Cardiovascular: Negative.    Gastrointestinal: Negative.    Genitourinary: Negative.    Musculoskeletal: Negative.         Left shoulder and left rib pain   Skin: Negative.    Neurological: Negative.    Psychiatric/Behavioral: Negative.      /81  Pulse 84   Temp 98.9 °F (37.2 °C) (Oral)   Resp 20     Physical Exam    Constitutional: She appears well-developed.   HENT:   Head: Normocephalic.   Right Ear: External ear normal.   Left Ear: External ear normal.   Nose: Nose normal.   Mouth/Throat: Oropharynx is clear and moist.   Eyes: EOM are normal. Pupils are equal, round, and reactive to light.   Neck: Normal range of motion.   Cardiovascular: Normal rate, regular rhythm and normal heart sounds.   Pulmonary/Chest: Breath sounds normal.   Abdominal: Soft.   Neurological: She is alert and oriented to person, place, and time.   Skin: Skin is warm.       ED Course   Procedures  Labs Reviewed - No data to display       Imaging Results          X-Ray Chest PA And Lateral (Final result)  Result time 10/11/20 07:57:28    Final result by Willy Mcghee MD  (10/11/20 07:57:28)                 Impression:      No acute radiographic abnormality.      Electronically signed by: Willy Mcghee  Date:    10/11/2020  Time:    07:57             Narrative:    EXAMINATION:  XR CHEST PA AND LATERAL    CLINICAL HISTORY:  Unspecified fall, initial encounter    TECHNIQUE:  PA and lateral views of the chest were performed.    COMPARISON:  09/30/2019    FINDINGS:  Cardiomediastinal silhouette is midline and within normal limits.   Chronic scattered thin bandlike opacities in bilateral mid and lower lung zones suggestive of scarring versus subsegmental atelectasis, unchanged.  No focal consolidation, significant pleural effusion, or pneumothorax.  Pulmonary vasculature and hilar regions are within normal limits.  Degenerative changes of the thoracic spine.                               X-Ray Ribs 2 View Left (Final result)  Result time 10/11/20 07:49:53    Final result by Willy Mcghee MD (10/11/20 07:49:53)                 Impression:      No radiographically evident displaced rib fracture.      Electronically signed by: Willy Mcghee  Date:    10/11/2020  Time:    07:49             Narrative:    EXAMINATION:  XR RIBS 2 VIEW LEFT    CLINICAL HISTORY:  Unspecified fall, initial encounter    TECHNIQUE:  Two views of the left ribs were performed.    COMPARISON:  Chest radiograph 09/30/2019.    FINDINGS:  No radiographically evident displaced rib fracture. No pneumothorax.  Soft tissues are unremarkable.                               X-Ray Shoulder 2 or More Views Left (Final result)  Result time 10/11/20 07:48:50    Final result by Willy Mcghee MD (10/11/20 07:48:50)                 Impression:      No radiographically evident acute fracture or dislocation.      Electronically signed by: Willy Mcghee  Date:    10/11/2020  Time:    07:48             Narrative:    EXAMINATION:  XR SHOULDER COMPLETE 2 OR MORE VIEWS LEFT    CLINICAL HISTORY:  Left shoulder pain;    TECHNIQUE:  Two or three views of  the left shoulder were performed.    COMPARISON:  Chest radiograph 2019    FINDINGS:  Bones: No fracture.  No lytic or blastic lesion.    Joints: No evidence for glenohumeral dislocation.  Mild left AC joint arthrosis.    Soft tissues: Unremarkable.                                                   ED Course as of 1343   Sat Oct 10, 2020   1950 Xray review no obvious pneumothroax rib films no obvious fracture however informed the patient it is possible to miss a small rib fracture.  Shoulder no dislocation or fracture.    [AB]      ED Course User Index  [AB] Sulaiman Link MD            Clinical Impression:       ICD-10-CM ICD-9-CM   1. Fall, initial encounter  W19.XXXA E888.9   2. Fall  W19.XXXA E888.9   3. Chest pain on breathing  R07.1 786.52   4. Rib pain on left side  R07.81 786.50                    ED Disposition Condition    Discharge Stable        ED Prescriptions     Medication Sig Dispense Start Date End Date Auth. Provider    diclofenac (VOLTAREN) 75 MG EC tablet () Take 1 tablet (75 mg total) by mouth 2 (two) times daily. for 4 days 8 tablet 10/10/2020 10/14/2020 Sulaiman Link MD        Follow-up Information    None                         Change Of Shift Note     -- Dr Link took this patient over at the time of shift change                     Willy Espinzoa MD  [id number]

## 2020-10-10 NOTE — ED TRIAGE NOTES
55 y.o. female presents to ER qTrack 05/qTrk 05   Chief Complaint   Patient presents with    Fall     left should pain radiating into left lateral chest, pt states she fell face first  and onto her left fist thursday   . No acute distress noted.

## 2020-10-13 DIAGNOSIS — M06.09 RHEUMATOID ARTHRITIS OF MULTIPLE SITES WITH NEGATIVE RHEUMATOID FACTOR: ICD-10-CM

## 2020-10-26 ENCOUNTER — PATIENT MESSAGE (OUTPATIENT)
Dept: RHEUMATOLOGY | Facility: CLINIC | Age: 55
End: 2020-10-26

## 2020-10-27 ENCOUNTER — PATIENT MESSAGE (OUTPATIENT)
Dept: RHEUMATOLOGY | Facility: CLINIC | Age: 55
End: 2020-10-27

## 2020-10-28 ENCOUNTER — PATIENT MESSAGE (OUTPATIENT)
Dept: RHEUMATOLOGY | Facility: CLINIC | Age: 55
End: 2020-10-28

## 2020-11-02 NOTE — TELEPHONE ENCOUNTER
Angela desire refill of Phenergan. LOV 7/02/20. Please advise.   Patient Active Problem List   Diagnosis    Iron deficiency anemia    B12 deficiency anemia    Generalized headaches    Fibromyalgia    Insomnia    Seronegative rheumatoid arthritis    High risk medication use    Hypothyroidism due to acquired atrophy of thyroid    Hypertension    Spondylosis of lumbosacral region without myelopathy or radiculopathy    HLD (hyperlipidemia)     Prior to Admission medications    Medication Sig Start Date End Date Taking? Authorizing Provider   cyanocobalamin 1,000 mcg/mL injection ADMINISTER 1 ML IN THE MUSCLE 3 TIMES A WEEK 9/10/20   Yoon Franco MD   entanercept (ENBREL) 50 mg/mL injection Inject 1 mL (50 mg total) into the skin once a week. 10/13/20   Andi Dhillon MD   hydroCHLOROthiazide (HYDRODIURIL) 12.5 MG Tab Take 1 tablet (12.5 mg total) by mouth once daily. 5/14/20 5/14/21  Shaneka Ceja NP   HYDROcodone-acetaminophen (NORCO) 5-325 mg per tablet Take 1 tablet by mouth every 8 (eight) hours as needed for Pain. 10/7/20 11/6/20  Andi Dhillon MD   hydrOXYzine (ATARAX) 50 MG tablet Take 1 tablet (50 mg total) by mouth 2 (two) times daily as needed for Itching. 7/28/20   Chava Montoya MD   levothyroxine (SYNTHROID) 100 MCG tablet Take 1 tablet (100 mcg total) by mouth before breakfast. 8/11/20 8/11/21  Shaneka Ceja NP   meloxicam (MOBIC) 7.5 MG tablet Take 1-2 tablets (7.5-15 mg total) by mouth daily as needed (arthritis). 6/2/20   Andi Dhillon MD   methocarbamoL (ROBAXIN) 750 MG Tab TAKE 1 TABLET BY MOUTH TWICE DAILY AS NEEDED 7/7/20   Andi Dhillon MD   nortriptyline (PAMELOR) 75 MG Cap Take 2 capsules (150 mg total) by mouth every evening. 9/29/20   Chava Montoya MD   potassium chloride (K-TAB) 20 mEq Take 1 tablet (20 mEq total) by mouth once daily. 9/29/20   Shaneka Ceja NP   pravastatin (PRAVACHOL) 80 MG tablet Take one tablet(80mg) by  mouth at bedtime 11/11/17   Historical Provider   promethazine (PHENERGAN) 25 MG tablet Take 1 tablet (25 mg total) by mouth every 6 (six) hours as needed for Nausea (or vomiting). 7/2/20   Helen Desai NP   propranoloL (INDERAL) 20 MG tablet TAKE 1 TABLET(20 MG) BY MOUTH TWICE DAILY 10/21/20   Carley Knox MD   levothyroxine (SYNTHROID) 112 MCG tablet TAKE 1 TABLET(112 MCG) BY MOUTH BEFORE BREAKFAST 7/16/20   Carley Knox MD   propranoloL (INDERAL) 20 MG tablet TAKE 1 TABLET(20 MG) BY MOUTH TWICE DAILY 8/24/20   Carley Knox MD

## 2020-11-03 ENCOUNTER — PATIENT MESSAGE (OUTPATIENT)
Dept: RHEUMATOLOGY | Facility: CLINIC | Age: 55
End: 2020-11-03

## 2020-11-03 RX ORDER — PROMETHAZINE HYDROCHLORIDE 25 MG/1
25 TABLET ORAL EVERY 6 HOURS PRN
Qty: 15 TABLET | Refills: 5 | Status: SHIPPED | OUTPATIENT
Start: 2020-11-03 | End: 2021-01-12

## 2020-11-04 ENCOUNTER — PATIENT MESSAGE (OUTPATIENT)
Dept: RHEUMATOLOGY | Facility: CLINIC | Age: 55
End: 2020-11-04

## 2020-11-06 DIAGNOSIS — M47.817 SPONDYLOSIS OF LUMBOSACRAL REGION WITHOUT MYELOPATHY OR RADICULOPATHY: ICD-10-CM

## 2020-11-06 RX ORDER — HYDROCODONE BITARTRATE AND ACETAMINOPHEN 5; 325 MG/1; MG/1
1 TABLET ORAL EVERY 8 HOURS PRN
Qty: 80 TABLET | Refills: 0 | Status: SHIPPED | OUTPATIENT
Start: 2020-11-06 | End: 2020-12-08 | Stop reason: SDUPTHER

## 2020-11-09 DIAGNOSIS — M06.09 RHEUMATOID ARTHRITIS OF MULTIPLE SITES WITH NEGATIVE RHEUMATOID FACTOR: ICD-10-CM

## 2020-11-11 ENCOUNTER — SPECIALTY PHARMACY (OUTPATIENT)
Dept: PHARMACY | Facility: CLINIC | Age: 55
End: 2020-11-11

## 2020-11-11 NOTE — TELEPHONE ENCOUNTER
Specialty Pharmacy - Refill Coordination    Specialty Medication Orders Linked to Encounter      Most Recent Value   Medication #1  entanercept (ENBREL) 50 mg/mL injection (Order#[id number], Rx#[id number])          Refill Questions - Documented Responses      Most Recent Value   Relationship to patient of person spoken to?  Self   HIPAA/medical authority confirmed?  Yes   Any changes in contact preferences or allowed representatives?  No   Has the patient had any insurance changes?  No   Has the patient had any changes to specialty medication, dose, or instructions?  No   Has the patient started taking any new medications, herbals, or supplements?  No   Has the patient been diagnosed with any new medical conditions?  No   Does the patient have any new allergies to medications or foods?  No   Does the patient have any concerns about side effects?  No   Can the patient store medication/sharps container properly (at the correct temperature, away from children/pets, etc.)?  Yes   Can the patient call emergency services (941) in the event of an emergency?  Yes   Does the patient have any concerns or questions about taking or administering this medication as prescribed?  No   How many doses did the patient miss in the past 4 weeks or since the last fill?  0   How many doses does the patient have on hand?  0   Does the number of doses/days supply remaining match pharmacy expected amounts?  Yes   Does the patient feel that this medication is effective?  Yes   During the past 4 weeks, has patient missed any activities due to condition or medication?  No   During the past 4 weeks, did patient have any of the following urgent care visits?  None   How will the patient receive the medication?  Mail   When does the patient need to receive the medication?  11/13/20   Shipping Address  Home   Address in [facility] confirmed and updated if neccessary?  Yes   Expected Copay ($)  0   Is the patient able to afford the medication  copay?  Yes   Payment Method  zero copay   Days supply of Refill  28   Would patient like to speak to a pharmacist?  No   Do you want to trigger an intervention?  No   Do you want to trigger an additional referral task?  No   Refill activity completed?  Yes   Refill activity plan  Refill scheduled   Shipment/Pickup Date:  11/11/20          Current Outpatient Medications   Medication Sig    cyanocobalamin 1,000 mcg/mL injection ADMINISTER 1 ML IN THE MUSCLE 3 TIMES A WEEK    entanercept (ENBREL) 50 mg/mL injection Inject 1 mL (50 mg total) into the skin once a week.    hydroCHLOROthiazide (HYDRODIURIL) 12.5 MG Tab Take 1 tablet (12.5 mg total) by mouth once daily.    HYDROcodone-acetaminophen (NORCO) 5-325 mg per tablet Take 1 tablet by mouth every 8 (eight) hours as needed for Pain.    hydrOXYzine (ATARAX) 50 MG tablet Take 1 tablet (50 mg total) by mouth 2 (two) times daily as needed for Itching.    levothyroxine (SYNTHROID) 100 MCG tablet Take 1 tablet (100 mcg total) by mouth before breakfast.    meloxicam (MOBIC) 7.5 MG tablet Take 1-2 tablets (7.5-15 mg total) by mouth daily as needed (arthritis).    methocarbamoL (ROBAXIN) 750 MG Tab TAKE 1 TABLET BY MOUTH TWICE DAILY AS NEEDED    nortriptyline (PAMELOR) 75 MG Cap Take 2 capsules (150 mg total) by mouth every evening.    potassium chloride (K-TAB) 20 mEq Take 1 tablet (20 mEq total) by mouth once daily.    pravastatin (PRAVACHOL) 80 MG tablet Take one tablet(80mg) by mouth at bedtime    promethazine (PHENERGAN) 25 MG tablet Take 1 tablet (25 mg total) by mouth every 6 (six) hours as needed for Nausea (or vomiting).    propranoloL (INDERAL) 20 MG tablet TAKE 1 TABLET(20 MG) BY MOUTH TWICE DAILY   Last reviewed on 10/10/2020  7:53 PM by Sulaiman Link MD    Review of patient's allergies indicates:   Allergen Reactions    Influenza vaccine tri-sp 09-10 Swelling    Last reviewed on  11/2/2020 8:37 AM by Erika Jiménez    Pt confirmed shipping of  Enbrel on  for delivery on . She will inject on . Pt address and  verified. $0.00 copayment in 004. Pt had no further questions or concerns.    Tasks added this encounter   No tasks added.   Tasks due within next 3 months   2020 - Refill Call  2021 - Clinical - Follow Up Assesement (90 day)     Mendoza Gallagher  [facility]  [address] [place] [id number]  Phone: [phone]  Fax: [phone]

## 2020-11-12 ENCOUNTER — PATIENT MESSAGE (OUTPATIENT)
Dept: NEUROLOGY | Facility: CLINIC | Age: 55
End: 2020-11-12

## 2020-11-18 ENCOUNTER — PATIENT MESSAGE (OUTPATIENT)
Dept: RHEUMATOLOGY | Facility: CLINIC | Age: 55
End: 2020-11-18

## 2020-11-23 DIAGNOSIS — M06.09 RHEUMATOID ARTHRITIS OF MULTIPLE SITES WITH NEGATIVE RHEUMATOID FACTOR: ICD-10-CM

## 2020-11-30 DIAGNOSIS — M06.00 SERONEGATIVE RHEUMATOID ARTHRITIS: ICD-10-CM

## 2020-11-30 DIAGNOSIS — M47.817 SPONDYLOSIS OF LUMBOSACRAL REGION WITHOUT MYELOPATHY OR RADICULOPATHY: ICD-10-CM

## 2020-11-30 RX ORDER — MELOXICAM 7.5 MG/1
TABLET ORAL
Qty: 180 TABLET | Refills: 0 | Status: SHIPPED | OUTPATIENT
Start: 2020-11-30 | End: 2021-04-08 | Stop reason: SDUPTHER

## 2020-12-09 DIAGNOSIS — M06.09 RHEUMATOID ARTHRITIS OF MULTIPLE SITES WITH NEGATIVE RHEUMATOID FACTOR: ICD-10-CM

## 2020-12-10 ENCOUNTER — SPECIALTY PHARMACY (OUTPATIENT)
Dept: PHARMACY | Facility: CLINIC | Age: 55
End: 2020-12-10

## 2020-12-10 NOTE — TELEPHONE ENCOUNTER
Specialty Pharmacy - Refill Coordination    Specialty Medication Orders Linked to Encounter      Most Recent Value   Medication #1  entanercept (ENBREL) 50 mg/mL injection (Order#[id number], Rx#[id number])          Refill Questions - Documented Responses      Most Recent Value   Relationship to patient of person spoken to?  Self   HIPAA/medical authority confirmed?  Yes   Any changes in contact preferences or allowed representatives?  No   Has the patient had any insurance changes?  No   Has the patient had any changes to specialty medication, dose, or instructions?  No   Has the patient started taking any new medications, herbals, or supplements?  No   Has the patient been diagnosed with any new medical conditions?  No   Does the patient have any new allergies to medications or foods?  No   Does the patient have any concerns about side effects?  No   Can the patient store medication/sharps container properly (at the correct temperature, away from children/pets, etc.)?  Yes   Can the patient call emergency services (911) in the event of an emergency?  Yes   Does the patient have any concerns or questions about taking or administering this medication as prescribed?  No   How many doses did the patient miss in the past 4 weeks or since the last fill?  0   How many doses does the patient have on hand?  0   How many days does the patient report on hand quantity will last?  1   How will the patient receive the medication?  Mail   When does the patient need to receive the medication?  12/11/20   Shipping Address  Home   Address in [facility] confirmed and updated if neccessary?  Yes   Expected Copay ($)  0   Is the patient able to afford the medication copay?  Yes   Payment Method  zero copay   Days supply of Refill  28   Would patient like to speak to a pharmacist?  No   Do you want to trigger an intervention?  No   Do you want to trigger an additional referral task?  No   Refill activity completed?  Yes   Refill  activity plan  Refill scheduled   Shipment/Pickup Date:  12/10/20          Current Outpatient Medications   Medication Sig    cyanocobalamin 1,000 mcg/mL injection ADMINISTER 1 ML IN THE MUSCLE 3 TIMES A WEEK    entanercept (ENBREL) 50 mg/mL injection Inject 1 mL (50 mg total) into the skin once a week.    hydroCHLOROthiazide (HYDRODIURIL) 12.5 MG Tab Take 1 tablet (12.5 mg total) by mouth once daily.    HYDROcodone-acetaminophen (NORCO) 5-325 mg per tablet Take 1 tablet by mouth every 8 (eight) hours as needed for Pain.    hydrOXYzine (ATARAX) 50 MG tablet Take 1 tablet (50 mg total) by mouth 2 (two) times daily as needed for Itching.    levothyroxine (SYNTHROID) 88 MCG tablet Take 1 tablet (88 mcg total) by mouth before breakfast.    liothyronine (CYTOMEL) 5 MCG Tab Take 1 tablet (5 mcg total) by mouth before breakfast.    meloxicam (MOBIC) 7.5 MG tablet TAKE 1 TO 2 TABLETS(7.5 TO 15 MG) BY MOUTH DAILY AS NEEDED FOR ARTHRITIS    methocarbamoL (ROBAXIN) 750 MG Tab TAKE 1 TABLET BY MOUTH TWICE DAILY AS NEEDED    nortriptyline (PAMELOR) 75 MG Cap Take 2 capsules (150 mg total) by mouth every evening.    potassium chloride (K-TAB) 20 mEq Take 1 tablet (20 mEq total) by mouth once daily.    pravastatin (PRAVACHOL) 80 MG tablet Take one tablet(80mg) by mouth at bedtime    promethazine (PHENERGAN) 25 MG tablet Take 1 tablet (25 mg total) by mouth every 6 (six) hours as needed for Nausea (or vomiting).    propranoloL (INDERAL) 20 MG tablet TAKE 1 TABLET(20 MG) BY MOUTH TWICE DAILY   Last reviewed on 12/2/2020  7:45 AM by Shaneka Ceja NP    Review of patient's allergies indicates:   Allergen Reactions    Influenza vaccine tri-sp 09-10 Swelling    Last reviewed on  12/2/2020 7:45 AM by Shaneka Ceja      Tasks added this encounter   12/31/2020 - Refill Call (Auto Added)   Tasks due within next 3 months   2/2/2021 - Clinical - Follow Up Assesement (90 day)     [facility] - Specialty  Pharmacy  [address] [address]  Phone: [phone]  Fax: [phone]

## 2021-01-02 ENCOUNTER — SPECIALTY PHARMACY (OUTPATIENT)
Dept: PHARMACY | Facility: CLINIC | Age: 56
End: 2021-01-02

## 2021-01-02 DIAGNOSIS — M06.09 RHEUMATOID ARTHRITIS OF MULTIPLE SITES WITH NEGATIVE RHEUMATOID FACTOR: ICD-10-CM

## 2021-01-04 ENCOUNTER — PATIENT MESSAGE (OUTPATIENT)
Dept: RHEUMATOLOGY | Facility: CLINIC | Age: 56
End: 2021-01-04

## 2021-01-04 ENCOUNTER — PATIENT MESSAGE (OUTPATIENT)
Dept: NEUROLOGY | Facility: CLINIC | Age: 56
End: 2021-01-04

## 2021-01-07 ENCOUNTER — SPECIALTY PHARMACY (OUTPATIENT)
Dept: PHARMACY | Facility: CLINIC | Age: 56
End: 2021-01-07

## 2021-01-07 DIAGNOSIS — M47.817 SPONDYLOSIS OF LUMBOSACRAL REGION WITHOUT MYELOPATHY OR RADICULOPATHY: ICD-10-CM

## 2021-01-07 RX ORDER — HYDROCODONE BITARTRATE AND ACETAMINOPHEN 5; 325 MG/1; MG/1
1 TABLET ORAL EVERY 8 HOURS PRN
Qty: 80 TABLET | Refills: 0 | Status: SHIPPED | OUTPATIENT
Start: 2021-01-07 | End: 2021-02-07 | Stop reason: SDUPTHER

## 2021-01-29 ENCOUNTER — PATIENT MESSAGE (OUTPATIENT)
Dept: PHARMACY | Facility: CLINIC | Age: 56
End: 2021-01-29

## 2021-01-29 DIAGNOSIS — M06.09 RHEUMATOID ARTHRITIS OF MULTIPLE SITES WITH NEGATIVE RHEUMATOID FACTOR: ICD-10-CM

## 2021-01-29 RX ORDER — ETANERCEPT 50 MG/ML
50 SOLUTION SUBCUTANEOUS WEEKLY
Qty: 4 ML | Refills: 0 | Status: CANCELLED | OUTPATIENT
Start: 2021-01-29

## 2021-02-02 DIAGNOSIS — M06.09 RHEUMATOID ARTHRITIS OF MULTIPLE SITES WITH NEGATIVE RHEUMATOID FACTOR: ICD-10-CM

## 2021-02-06 ENCOUNTER — SPECIALTY PHARMACY (OUTPATIENT)
Dept: PHARMACY | Facility: CLINIC | Age: 56
End: 2021-02-06

## 2021-02-06 DIAGNOSIS — M47.817 SPONDYLOSIS OF LUMBOSACRAL REGION WITHOUT MYELOPATHY OR RADICULOPATHY: Primary | ICD-10-CM

## 2021-02-07 DIAGNOSIS — M47.817 SPONDYLOSIS OF LUMBOSACRAL REGION WITHOUT MYELOPATHY OR RADICULOPATHY: ICD-10-CM

## 2021-02-08 RX ORDER — HYDROCODONE BITARTRATE AND ACETAMINOPHEN 5; 325 MG/1; MG/1
1 TABLET ORAL EVERY 8 HOURS PRN
Qty: 80 TABLET | Refills: 0 | Status: SHIPPED | OUTPATIENT
Start: 2021-02-08 | End: 2021-03-08 | Stop reason: SDUPTHER

## 2021-02-10 DIAGNOSIS — M79.7 FIBROMYALGIA: Chronic | ICD-10-CM

## 2021-02-10 RX ORDER — METHOCARBAMOL 750 MG/1
750 TABLET, FILM COATED ORAL 2 TIMES DAILY PRN
Qty: 60 TABLET | Refills: 2 | Status: SHIPPED | OUTPATIENT
Start: 2021-02-10 | End: 2021-06-28

## 2021-02-23 ENCOUNTER — PATIENT MESSAGE (OUTPATIENT)
Dept: RHEUMATOLOGY | Facility: CLINIC | Age: 56
End: 2021-02-23

## 2021-03-03 ENCOUNTER — PATIENT MESSAGE (OUTPATIENT)
Dept: PHARMACY | Facility: CLINIC | Age: 56
End: 2021-03-03

## 2021-03-05 DIAGNOSIS — M06.09 RHEUMATOID ARTHRITIS OF MULTIPLE SITES WITH NEGATIVE RHEUMATOID FACTOR: ICD-10-CM

## 2021-03-05 RX ORDER — ETANERCEPT 50 MG/ML
50 SOLUTION SUBCUTANEOUS WEEKLY
Qty: 4 ML | Refills: 0 | Status: SHIPPED | OUTPATIENT
Start: 2021-03-05 | End: 2021-04-08 | Stop reason: SDUPTHER

## 2021-03-07 ENCOUNTER — PATIENT MESSAGE (OUTPATIENT)
Dept: RHEUMATOLOGY | Facility: CLINIC | Age: 56
End: 2021-03-07

## 2021-03-08 DIAGNOSIS — M47.817 SPONDYLOSIS OF LUMBOSACRAL REGION WITHOUT MYELOPATHY OR RADICULOPATHY: ICD-10-CM

## 2021-03-08 RX ORDER — HYDROCODONE BITARTRATE AND ACETAMINOPHEN 5; 325 MG/1; MG/1
1 TABLET ORAL EVERY 8 HOURS PRN
Qty: 80 TABLET | Refills: 0 | Status: SHIPPED | OUTPATIENT
Start: 2021-03-08 | End: 2021-04-08 | Stop reason: SDUPTHER

## 2021-03-10 ENCOUNTER — SPECIALTY PHARMACY (OUTPATIENT)
Dept: PHARMACY | Facility: CLINIC | Age: 56
End: 2021-03-10

## 2021-03-24 ENCOUNTER — OFFICE VISIT (OUTPATIENT)
Dept: RHEUMATOLOGY | Facility: CLINIC | Age: 56
End: 2021-03-24
Payer: MEDICAID

## 2021-03-24 VITALS
SYSTOLIC BLOOD PRESSURE: 142 MMHG | WEIGHT: 185.44 LBS | HEART RATE: 94 BPM | DIASTOLIC BLOOD PRESSURE: 83 MMHG | BODY MASS INDEX: 27.46 KG/M2 | HEIGHT: 69 IN

## 2021-03-24 DIAGNOSIS — Z79.899 HIGH RISK MEDICATION USE: Chronic | ICD-10-CM

## 2021-03-24 DIAGNOSIS — M06.00 SERONEGATIVE RHEUMATOID ARTHRITIS: Primary | ICD-10-CM

## 2021-03-24 DIAGNOSIS — M79.7 FIBROMYALGIA: Chronic | ICD-10-CM

## 2021-03-24 PROCEDURE — 99214 PR OFFICE/OUTPT VISIT, EST, LEVL IV, 30-39 MIN: ICD-10-PCS | Mod: S$PBB,,, | Performed by: INTERNAL MEDICINE

## 2021-03-24 PROCEDURE — 99999 PR PBB SHADOW E&M-EST. PATIENT-LVL IV: ICD-10-PCS | Mod: PBBFAC,,, | Performed by: INTERNAL MEDICINE

## 2021-03-24 PROCEDURE — 99214 OFFICE O/P EST MOD 30 MIN: CPT | Mod: S$PBB,,, | Performed by: INTERNAL MEDICINE

## 2021-03-24 PROCEDURE — 99999 PR PBB SHADOW E&M-EST. PATIENT-LVL IV: CPT | Mod: PBBFAC,,, | Performed by: INTERNAL MEDICINE

## 2021-03-24 PROCEDURE — 99214 OFFICE O/P EST MOD 30 MIN: CPT | Mod: PBBFAC | Performed by: INTERNAL MEDICINE

## 2021-03-24 ASSESSMENT — ROUTINE ASSESSMENT OF PATIENT INDEX DATA (RAPID3)
PATIENT GLOBAL ASSESSMENT SCORE: 5.5
AM STIFFNESS SCORE: 1, YES
PSYCHOLOGICAL DISTRESS SCORE: 2.2
TOTAL RAPID3 SCORE: 3.72
FATIGUE SCORE: 8
MDHAQ FUNCTION SCORE: 0.5
PAIN SCORE: 4
WHEN YOU AWAKENED IN THE MORNING OVER THE LAST WEEK, PLEASE INDICATE THE AMOUNT OF TIME IT TAKES UNTIL YOU ARE AS LIMBER AS YOU WILL BE FOR THE DAY: 30 MINUTES

## 2021-04-08 DIAGNOSIS — M47.817 SPONDYLOSIS OF LUMBOSACRAL REGION WITHOUT MYELOPATHY OR RADICULOPATHY: ICD-10-CM

## 2021-04-08 DIAGNOSIS — M06.00 SERONEGATIVE RHEUMATOID ARTHRITIS: ICD-10-CM

## 2021-04-08 DIAGNOSIS — M06.09 RHEUMATOID ARTHRITIS OF MULTIPLE SITES WITH NEGATIVE RHEUMATOID FACTOR: ICD-10-CM

## 2021-04-08 RX ORDER — HYDROCODONE BITARTRATE AND ACETAMINOPHEN 5; 325 MG/1; MG/1
1 TABLET ORAL EVERY 8 HOURS PRN
Qty: 80 TABLET | Refills: 0 | Status: SHIPPED | OUTPATIENT
Start: 2021-04-08 | End: 2021-05-04 | Stop reason: SDUPTHER

## 2021-04-08 RX ORDER — MELOXICAM 7.5 MG/1
TABLET ORAL
Qty: 180 TABLET | Refills: 0 | Status: SHIPPED | OUTPATIENT
Start: 2021-04-08 | End: 2021-06-28

## 2021-04-08 RX ORDER — ETANERCEPT 50 MG/ML
50 SOLUTION SUBCUTANEOUS WEEKLY
Qty: 4 ML | Refills: 2 | Status: SHIPPED | OUTPATIENT
Start: 2021-04-08 | End: 2021-07-02 | Stop reason: SDUPTHER

## 2021-04-09 ENCOUNTER — SPECIALTY PHARMACY (OUTPATIENT)
Dept: PHARMACY | Facility: CLINIC | Age: 56
End: 2021-04-09

## 2021-05-03 ENCOUNTER — PATIENT MESSAGE (OUTPATIENT)
Dept: PHARMACY | Facility: CLINIC | Age: 56
End: 2021-05-03

## 2021-05-12 ENCOUNTER — SPECIALTY PHARMACY (OUTPATIENT)
Dept: PHARMACY | Facility: CLINIC | Age: 56
End: 2021-05-12

## 2021-06-02 DIAGNOSIS — M47.817 SPONDYLOSIS OF LUMBOSACRAL REGION WITHOUT MYELOPATHY OR RADICULOPATHY: ICD-10-CM

## 2021-06-02 RX ORDER — HYDROCODONE BITARTRATE AND ACETAMINOPHEN 5; 325 MG/1; MG/1
1 TABLET ORAL EVERY 8 HOURS PRN
Qty: 80 TABLET | Refills: 0 | Status: CANCELLED | OUTPATIENT
Start: 2021-06-02 | End: 2021-07-02

## 2021-06-03 ENCOUNTER — PATIENT MESSAGE (OUTPATIENT)
Dept: RHEUMATOLOGY | Facility: CLINIC | Age: 56
End: 2021-06-03

## 2021-06-03 DIAGNOSIS — M47.817 SPONDYLOSIS OF LUMBOSACRAL REGION WITHOUT MYELOPATHY OR RADICULOPATHY: ICD-10-CM

## 2021-06-04 ENCOUNTER — PATIENT MESSAGE (OUTPATIENT)
Dept: RHEUMATOLOGY | Facility: CLINIC | Age: 56
End: 2021-06-04

## 2021-06-04 RX ORDER — HYDROCODONE BITARTRATE AND ACETAMINOPHEN 5; 325 MG/1; MG/1
1 TABLET ORAL EVERY 8 HOURS PRN
Qty: 80 TABLET | Refills: 0 | Status: SHIPPED | OUTPATIENT
Start: 2021-06-06 | End: 2021-07-05 | Stop reason: SDUPTHER

## 2021-06-09 ENCOUNTER — SPECIALTY PHARMACY (OUTPATIENT)
Dept: PHARMACY | Facility: CLINIC | Age: 56
End: 2021-06-09

## 2021-07-01 DIAGNOSIS — M79.7 FIBROMYALGIA: Chronic | ICD-10-CM

## 2021-07-01 RX ORDER — METHOCARBAMOL 750 MG/1
TABLET, FILM COATED ORAL
Qty: 60 TABLET | Refills: 2 | Status: SHIPPED | OUTPATIENT
Start: 2021-07-01 | End: 2021-12-13

## 2021-07-02 ENCOUNTER — SPECIALTY PHARMACY (OUTPATIENT)
Dept: PHARMACY | Facility: CLINIC | Age: 56
End: 2021-07-02

## 2021-07-02 DIAGNOSIS — M06.09 RHEUMATOID ARTHRITIS OF MULTIPLE SITES WITH NEGATIVE RHEUMATOID FACTOR: ICD-10-CM

## 2021-07-02 RX ORDER — ETANERCEPT 50 MG/ML
50 SOLUTION SUBCUTANEOUS WEEKLY
Qty: 4 ML | Refills: 2 | Status: SHIPPED | OUTPATIENT
Start: 2021-07-02 | End: 2021-10-12 | Stop reason: SDUPTHER

## 2021-07-06 ENCOUNTER — PATIENT MESSAGE (OUTPATIENT)
Dept: PHARMACY | Facility: CLINIC | Age: 56
End: 2021-07-06

## 2021-08-04 ENCOUNTER — PATIENT MESSAGE (OUTPATIENT)
Dept: PHARMACY | Facility: CLINIC | Age: 56
End: 2021-08-04

## 2021-08-04 ENCOUNTER — SPECIALTY PHARMACY (OUTPATIENT)
Dept: PHARMACY | Facility: CLINIC | Age: 56
End: 2021-08-04

## 2021-08-17 ENCOUNTER — PATIENT MESSAGE (OUTPATIENT)
Dept: NEUROLOGY | Facility: CLINIC | Age: 56
End: 2021-08-17

## 2021-09-05 ENCOUNTER — PATIENT MESSAGE (OUTPATIENT)
Dept: RHEUMATOLOGY | Facility: CLINIC | Age: 56
End: 2021-09-05

## 2021-09-06 DIAGNOSIS — M47.817 SPONDYLOSIS OF LUMBOSACRAL REGION WITHOUT MYELOPATHY OR RADICULOPATHY: ICD-10-CM

## 2021-09-07 ENCOUNTER — PATIENT MESSAGE (OUTPATIENT)
Dept: RHEUMATOLOGY | Facility: CLINIC | Age: 56
End: 2021-09-07

## 2021-09-07 RX ORDER — HYDROCODONE BITARTRATE AND ACETAMINOPHEN 5; 325 MG/1; MG/1
1 TABLET ORAL EVERY 8 HOURS PRN
Qty: 80 TABLET | Refills: 0 | Status: SHIPPED | OUTPATIENT
Start: 2021-09-07 | End: 2021-10-05 | Stop reason: SDUPTHER

## 2021-09-07 RX ORDER — HYDROCODONE BITARTRATE AND ACETAMINOPHEN 5; 325 MG/1; MG/1
1 TABLET ORAL EVERY 8 HOURS PRN
Qty: 80 TABLET | Refills: 0 | Status: SHIPPED | OUTPATIENT
Start: 2021-09-07 | End: 2021-09-07 | Stop reason: SDUPTHER

## 2021-09-10 ENCOUNTER — SPECIALTY PHARMACY (OUTPATIENT)
Dept: PHARMACY | Facility: CLINIC | Age: 56
End: 2021-09-10

## 2021-09-15 ENCOUNTER — PATIENT MESSAGE (OUTPATIENT)
Dept: RHEUMATOLOGY | Facility: CLINIC | Age: 56
End: 2021-09-15

## 2021-09-17 ENCOUNTER — TELEPHONE (OUTPATIENT)
Dept: RHEUMATOLOGY | Facility: CLINIC | Age: 56
End: 2021-09-17

## 2021-09-20 ENCOUNTER — PATIENT MESSAGE (OUTPATIENT)
Dept: PHARMACY | Facility: CLINIC | Age: 56
End: 2021-09-20

## 2021-09-21 ENCOUNTER — SPECIALTY PHARMACY (OUTPATIENT)
Dept: PHARMACY | Facility: CLINIC | Age: 56
End: 2021-09-21

## 2021-09-21 ENCOUNTER — LAB VISIT (OUTPATIENT)
Dept: LAB | Facility: HOSPITAL | Age: 56
End: 2021-09-21
Attending: INTERNAL MEDICINE
Payer: MEDICAID

## 2021-09-21 DIAGNOSIS — M06.09 RHEUMATOID ARTHRITIS OF MULTIPLE SITES WITH NEGATIVE RHEUMATOID FACTOR: ICD-10-CM

## 2021-09-21 DIAGNOSIS — Z79.899 HIGH RISK MEDICATION USE: Chronic | ICD-10-CM

## 2021-09-21 LAB
ALBUMIN SERPL BCP-MCNC: 3.8 G/DL (ref 3.5–5.2)
ALP SERPL-CCNC: 99 U/L (ref 55–135)
ALT SERPL W/O P-5'-P-CCNC: 19 U/L (ref 10–44)
ANION GAP SERPL CALC-SCNC: 7 MMOL/L (ref 8–16)
AST SERPL-CCNC: 17 U/L (ref 10–40)
BASOPHILS # BLD AUTO: 0.01 K/UL (ref 0–0.2)
BASOPHILS NFR BLD: 0.2 % (ref 0–1.9)
BILIRUB SERPL-MCNC: 0.5 MG/DL (ref 0.1–1)
BUN SERPL-MCNC: 14 MG/DL (ref 6–20)
CALCIUM SERPL-MCNC: 10 MG/DL (ref 8.7–10.5)
CHLORIDE SERPL-SCNC: 106 MMOL/L (ref 95–110)
CO2 SERPL-SCNC: 28 MMOL/L (ref 23–29)
CREAT SERPL-MCNC: 1 MG/DL (ref 0.5–1.4)
DIFFERENTIAL METHOD: NORMAL
EOSINOPHIL # BLD AUTO: 0 K/UL (ref 0–0.5)
EOSINOPHIL NFR BLD: 0.2 % (ref 0–8)
ERYTHROCYTE [DISTWIDTH] IN BLOOD BY AUTOMATED COUNT: 13.8 % (ref 11.5–14.5)
ERYTHROCYTE [SEDIMENTATION RATE] IN BLOOD BY WESTERGREN METHOD: 22 MM/HR (ref 0–20)
EST. GFR  (AFRICAN AMERICAN): >60 ML/MIN/1.73 M^2
EST. GFR  (NON AFRICAN AMERICAN): >60 ML/MIN/1.73 M^2
GLUCOSE SERPL-MCNC: 95 MG/DL (ref 70–110)
HCT VFR BLD AUTO: 41.9 % (ref 37–48.5)
HGB BLD-MCNC: 13.6 G/DL (ref 12–16)
IMM GRANULOCYTES # BLD AUTO: 0.01 K/UL (ref 0–0.04)
IMM GRANULOCYTES NFR BLD AUTO: 0.2 % (ref 0–0.5)
LYMPHOCYTES # BLD AUTO: 1.5 K/UL (ref 1–4.8)
LYMPHOCYTES NFR BLD: 30.9 % (ref 18–48)
MCH RBC QN AUTO: 27 PG (ref 27–31)
MCHC RBC AUTO-ENTMCNC: 32.5 G/DL (ref 32–36)
MCV RBC AUTO: 83 FL (ref 82–98)
MONOCYTES # BLD AUTO: 0.4 K/UL (ref 0.3–1)
MONOCYTES NFR BLD: 8.1 % (ref 4–15)
NEUTROPHILS # BLD AUTO: 3 K/UL (ref 1.8–7.7)
NEUTROPHILS NFR BLD: 60.4 % (ref 38–73)
NRBC BLD-RTO: 0 /100 WBC
PLATELET # BLD AUTO: 233 K/UL (ref 150–450)
PMV BLD AUTO: 9.4 FL (ref 9.2–12.9)
POTASSIUM SERPL-SCNC: 3.8 MMOL/L (ref 3.5–5.1)
PROT SERPL-MCNC: 7.3 G/DL (ref 6–8.4)
RBC # BLD AUTO: 5.03 M/UL (ref 4–5.4)
SODIUM SERPL-SCNC: 141 MMOL/L (ref 136–145)
WBC # BLD AUTO: 4.95 K/UL (ref 3.9–12.7)

## 2021-09-21 PROCEDURE — 36415 COLL VENOUS BLD VENIPUNCTURE: CPT | Performed by: INTERNAL MEDICINE

## 2021-09-21 PROCEDURE — 86140 C-REACTIVE PROTEIN: CPT | Performed by: INTERNAL MEDICINE

## 2021-09-21 PROCEDURE — 80053 COMPREHEN METABOLIC PANEL: CPT | Performed by: INTERNAL MEDICINE

## 2021-09-21 PROCEDURE — 85025 COMPLETE CBC W/AUTO DIFF WBC: CPT | Performed by: INTERNAL MEDICINE

## 2021-09-21 PROCEDURE — 85651 RBC SED RATE NONAUTOMATED: CPT | Performed by: INTERNAL MEDICINE

## 2021-09-22 LAB — CRP SERPL-MCNC: 13.1 MG/L (ref 0–8.2)

## 2021-09-24 ENCOUNTER — HOSPITAL ENCOUNTER (OUTPATIENT)
Dept: RADIOLOGY | Facility: HOSPITAL | Age: 56
Discharge: HOME | End: 2021-09-24
Attending: INTERNAL MEDICINE
Payer: MEDICAID

## 2021-09-24 DIAGNOSIS — M06.00 SERONEGATIVE RHEUMATOID ARTHRITIS: ICD-10-CM

## 2021-09-24 PROCEDURE — 73130 XR HAND COMPLETE 3 VIEWS BILATERAL: ICD-10-PCS | Mod: 26,50,, | Performed by: RADIOLOGY

## 2021-09-24 PROCEDURE — 73130 X-RAY EXAM OF HAND: CPT | Mod: TC,50

## 2021-09-24 PROCEDURE — 73130 X-RAY EXAM OF HAND: CPT | Mod: 26,50,, | Performed by: RADIOLOGY

## 2021-09-28 ENCOUNTER — OFFICE VISIT (OUTPATIENT)
Dept: RHEUMATOLOGY | Facility: CLINIC | Age: 56
End: 2021-09-28
Payer: MEDICAID

## 2021-09-28 VITALS
WEIGHT: 179.88 LBS | HEIGHT: 69 IN | BODY MASS INDEX: 26.64 KG/M2 | HEART RATE: 69 BPM | DIASTOLIC BLOOD PRESSURE: 86 MMHG | SYSTOLIC BLOOD PRESSURE: 146 MMHG

## 2021-09-28 DIAGNOSIS — Z79.899 HIGH RISK MEDICATION USE: Chronic | ICD-10-CM

## 2021-09-28 DIAGNOSIS — M79.7 FIBROMYALGIA: Chronic | ICD-10-CM

## 2021-09-28 DIAGNOSIS — M06.00 SERONEGATIVE RHEUMATOID ARTHRITIS: ICD-10-CM

## 2021-09-28 PROCEDURE — 99214 OFFICE O/P EST MOD 30 MIN: CPT | Mod: PBBFAC | Performed by: INTERNAL MEDICINE

## 2021-09-28 PROCEDURE — 99999 PR PBB SHADOW E&M-EST. PATIENT-LVL IV: CPT | Mod: PBBFAC,,, | Performed by: INTERNAL MEDICINE

## 2021-09-28 PROCEDURE — 99999 PR PBB SHADOW E&M-EST. PATIENT-LVL IV: ICD-10-PCS | Mod: PBBFAC,,, | Performed by: INTERNAL MEDICINE

## 2021-09-28 PROCEDURE — 99214 PR OFFICE/OUTPT VISIT, EST, LEVL IV, 30-39 MIN: ICD-10-PCS | Mod: S$PBB,,, | Performed by: INTERNAL MEDICINE

## 2021-09-28 PROCEDURE — 99214 OFFICE O/P EST MOD 30 MIN: CPT | Mod: S$PBB,,, | Performed by: INTERNAL MEDICINE

## 2021-09-28 ASSESSMENT — ROUTINE ASSESSMENT OF PATIENT INDEX DATA (RAPID3)
MDHAQ FUNCTION SCORE: 0.6
WHEN YOU AWAKENED IN THE MORNING OVER THE LAST WEEK, PLEASE INDICATE THE AMOUNT OF TIME IT TAKES UNTIL YOU ARE AS LIMBER AS YOU WILL BE FOR THE DAY: 45 MINUTES
FATIGUE SCORE: 5
AM STIFFNESS SCORE: 1, YES
TOTAL RAPID3 SCORE: 4.33
PSYCHOLOGICAL DISTRESS SCORE: 1.1
PATIENT GLOBAL ASSESSMENT SCORE: 4
PAIN SCORE: 7

## 2021-10-05 ENCOUNTER — OFFICE VISIT (OUTPATIENT)
Dept: NEUROLOGY | Facility: CLINIC | Age: 56
End: 2021-10-05
Payer: MEDICAID

## 2021-10-05 ENCOUNTER — TELEPHONE (OUTPATIENT)
Dept: NEUROLOGY | Facility: CLINIC | Age: 56
End: 2021-10-05

## 2021-10-05 ENCOUNTER — PATIENT MESSAGE (OUTPATIENT)
Dept: RHEUMATOLOGY | Facility: CLINIC | Age: 56
End: 2021-10-05

## 2021-10-05 VITALS
SYSTOLIC BLOOD PRESSURE: 122 MMHG | DIASTOLIC BLOOD PRESSURE: 80 MMHG | WEIGHT: 178.13 LBS | HEART RATE: 76 BPM | HEIGHT: 69 IN | RESPIRATION RATE: 18 BRPM | BODY MASS INDEX: 26.38 KG/M2

## 2021-10-05 DIAGNOSIS — G89.29 CHRONIC NONINTRACTABLE HEADACHE, UNSPECIFIED HEADACHE TYPE: Primary | ICD-10-CM

## 2021-10-05 DIAGNOSIS — M79.7 FIBROMYALGIA: Chronic | ICD-10-CM

## 2021-10-05 DIAGNOSIS — M47.817 SPONDYLOSIS OF LUMBOSACRAL REGION WITHOUT MYELOPATHY OR RADICULOPATHY: ICD-10-CM

## 2021-10-05 DIAGNOSIS — G47.33 OSA (OBSTRUCTIVE SLEEP APNEA): ICD-10-CM

## 2021-10-05 DIAGNOSIS — R51.9 CHRONIC NONINTRACTABLE HEADACHE, UNSPECIFIED HEADACHE TYPE: Primary | ICD-10-CM

## 2021-10-05 DIAGNOSIS — M06.00 SERONEGATIVE RHEUMATOID ARTHRITIS: ICD-10-CM

## 2021-10-05 DIAGNOSIS — D51.8 OTHER VITAMIN B12 DEFICIENCY ANEMIA: ICD-10-CM

## 2021-10-05 PROCEDURE — 99215 OFFICE O/P EST HI 40 MIN: CPT | Mod: PBBFAC | Performed by: NURSE PRACTITIONER

## 2021-10-05 PROCEDURE — 99999 PR PBB SHADOW E&M-EST. PATIENT-LVL V: CPT | Mod: PBBFAC,,, | Performed by: NURSE PRACTITIONER

## 2021-10-05 PROCEDURE — 99999 PR PBB SHADOW E&M-EST. PATIENT-LVL V: ICD-10-PCS | Mod: PBBFAC,,, | Performed by: NURSE PRACTITIONER

## 2021-10-05 PROCEDURE — 99213 OFFICE O/P EST LOW 20 MIN: CPT | Mod: S$PBB,,, | Performed by: NURSE PRACTITIONER

## 2021-10-05 PROCEDURE — 99213 PR OFFICE/OUTPT VISIT, EST, LEVL III, 20-29 MIN: ICD-10-PCS | Mod: S$PBB,,, | Performed by: NURSE PRACTITIONER

## 2021-10-05 RX ORDER — HYDROCODONE BITARTRATE AND ACETAMINOPHEN 5; 325 MG/1; MG/1
1 TABLET ORAL EVERY 8 HOURS PRN
Qty: 80 TABLET | Refills: 0 | Status: SHIPPED | OUTPATIENT
Start: 2021-10-08 | End: 2021-11-05 | Stop reason: SDUPTHER

## 2021-10-05 RX ORDER — NORTRIPTYLINE HYDROCHLORIDE 75 MG/1
150 CAPSULE ORAL NIGHTLY
Qty: 180 CAPSULE | Refills: 3 | Status: SHIPPED | OUTPATIENT
Start: 2021-10-05 | End: 2022-10-20 | Stop reason: SDUPTHER

## 2021-10-12 DIAGNOSIS — M06.09 RHEUMATOID ARTHRITIS OF MULTIPLE SITES WITH NEGATIVE RHEUMATOID FACTOR: ICD-10-CM

## 2021-10-14 RX ORDER — ETANERCEPT 50 MG/ML
50 SOLUTION SUBCUTANEOUS WEEKLY
Qty: 4 ML | Refills: 2 | Status: SHIPPED | OUTPATIENT
Start: 2021-10-14 | End: 2022-01-20 | Stop reason: SDUPTHER

## 2021-10-15 ENCOUNTER — PATIENT MESSAGE (OUTPATIENT)
Dept: PHARMACY | Facility: CLINIC | Age: 56
End: 2021-10-15
Payer: MEDICAID

## 2021-10-18 ENCOUNTER — SPECIALTY PHARMACY (OUTPATIENT)
Dept: PHARMACY | Facility: CLINIC | Age: 56
End: 2021-10-18

## 2021-10-18 ENCOUNTER — TELEPHONE (OUTPATIENT)
Dept: PHARMACY | Facility: CLINIC | Age: 56
End: 2021-10-18

## 2021-11-05 DIAGNOSIS — M47.817 SPONDYLOSIS OF LUMBOSACRAL REGION WITHOUT MYELOPATHY OR RADICULOPATHY: ICD-10-CM

## 2021-11-05 RX ORDER — HYDROCODONE BITARTRATE AND ACETAMINOPHEN 5; 325 MG/1; MG/1
1 TABLET ORAL EVERY 8 HOURS PRN
Qty: 80 TABLET | Refills: 0 | Status: SHIPPED | OUTPATIENT
Start: 2021-11-05 | End: 2021-12-02 | Stop reason: SDUPTHER

## 2021-11-11 ENCOUNTER — PATIENT MESSAGE (OUTPATIENT)
Dept: PHARMACY | Facility: CLINIC | Age: 56
End: 2021-11-11
Payer: MEDICAID

## 2021-11-12 ENCOUNTER — SPECIALTY PHARMACY (OUTPATIENT)
Dept: PHARMACY | Facility: CLINIC | Age: 56
End: 2021-11-12
Payer: MEDICAID

## 2021-12-14 ENCOUNTER — PATIENT MESSAGE (OUTPATIENT)
Dept: PHARMACY | Facility: CLINIC | Age: 56
End: 2021-12-14
Payer: MEDICAID

## 2021-12-14 ENCOUNTER — PATIENT MESSAGE (OUTPATIENT)
Dept: NEUROLOGY | Facility: CLINIC | Age: 56
End: 2021-12-14
Payer: MEDICAID

## 2021-12-23 ENCOUNTER — SPECIALTY PHARMACY (OUTPATIENT)
Dept: PHARMACY | Facility: CLINIC | Age: 56
End: 2021-12-23
Payer: MEDICAID

## 2022-01-04 DIAGNOSIS — M47.817 SPONDYLOSIS OF LUMBOSACRAL REGION WITHOUT MYELOPATHY OR RADICULOPATHY: ICD-10-CM

## 2022-01-04 RX ORDER — HYDROCODONE BITARTRATE AND ACETAMINOPHEN 5; 325 MG/1; MG/1
1 TABLET ORAL EVERY 8 HOURS PRN
Qty: 80 TABLET | Refills: 0 | Status: CANCELLED | OUTPATIENT
Start: 2022-01-04 | End: 2022-02-03

## 2022-01-05 DIAGNOSIS — M47.817 SPONDYLOSIS OF LUMBOSACRAL REGION WITHOUT MYELOPATHY OR RADICULOPATHY: ICD-10-CM

## 2022-01-05 RX ORDER — HYDROCODONE BITARTRATE AND ACETAMINOPHEN 5; 325 MG/1; MG/1
1 TABLET ORAL EVERY 8 HOURS PRN
Qty: 80 TABLET | Refills: 0 | Status: CANCELLED | OUTPATIENT
Start: 2022-01-05 | End: 2022-02-04

## 2022-01-06 ENCOUNTER — PATIENT MESSAGE (OUTPATIENT)
Dept: RHEUMATOLOGY | Facility: CLINIC | Age: 57
End: 2022-01-06
Payer: MEDICAID

## 2022-01-06 DIAGNOSIS — M47.817 SPONDYLOSIS OF LUMBOSACRAL REGION WITHOUT MYELOPATHY OR RADICULOPATHY: ICD-10-CM

## 2022-01-06 RX ORDER — HYDROCODONE BITARTRATE AND ACETAMINOPHEN 5; 325 MG/1; MG/1
1 TABLET ORAL EVERY 8 HOURS PRN
Qty: 80 TABLET | Refills: 0 | Status: SHIPPED | OUTPATIENT
Start: 2022-01-06 | End: 2022-02-03 | Stop reason: SDUPTHER

## 2022-01-20 DIAGNOSIS — M06.09 RHEUMATOID ARTHRITIS OF MULTIPLE SITES WITH NEGATIVE RHEUMATOID FACTOR: ICD-10-CM

## 2022-01-20 RX ORDER — ETANERCEPT 50 MG/ML
50 SOLUTION SUBCUTANEOUS WEEKLY
Qty: 4 ML | Refills: 2 | Status: SHIPPED | OUTPATIENT
Start: 2022-01-20 | End: 2022-04-28 | Stop reason: SDUPTHER

## 2022-01-27 ENCOUNTER — SPECIALTY PHARMACY (OUTPATIENT)
Dept: PHARMACY | Facility: CLINIC | Age: 57
End: 2022-01-27
Payer: MEDICAID

## 2022-01-27 NOTE — TELEPHONE ENCOUNTER
Specialty Pharmacy - Refill Coordination    Specialty Medication Orders Linked to Encounter    Flowsheet Row Most Recent Value   Medication #1 etanercept (ENBREL SURECLICK) 50 mg/mL (1 mL) (Order#[id number], Rx#[id number])          Refill Questions - Documented Responses    Flowsheet Row Most Recent Value   Patient Availability and HIPAA Verification    Does patient want to proceed with activity? Yes   HIPAA/medical authority confirmed? Yes   Relationship to patient of person spoken to? Self   Refill Screening Questions    Changes to allergies? No   Changes to medications? No   New conditions since last clinic visit? No   Unplanned office visit, urgent care, ED, or hospital admission in the last 4 weeks? No   How does patient/caregiver feel medication is working? Good   Financial problems or insurance changes? No   How many doses of your specialty medications were missed in the last 4 weeks? 0   Would patient like to speak to a pharmacist? No   When does the patient need to receive the medication? 02/03/22   Refill Delivery Questions    How will the patient receive the medication? Delivery Carolyn   When does the patient need to receive the medication? 02/03/22   Shipping Address Home   Address in [facility] confirmed and updated if neccessary? Yes   Expected Copay ($) 0   Is the patient able to afford the medication copay? Yes   Payment Method zero copay   Days supply of Refill 28   Supplies needed? No supplies needed   Refill activity completed? Yes   Refill activity plan Refill scheduled   Shipment/Pickup Date: 02/01/22          Current Outpatient Medications   Medication Sig    cyanocobalamin 1,000 mcg/mL injection ADMINISTER 1 ML IN THE MUSCLE 3 TIMES A WEEK    etanercept (ENBREL SURECLICK) 50 mg/mL (1 mL) Inject 1 mL (50 mg total) into the skin once a week.    hydroCHLOROthiazide (HYDRODIURIL) 12.5 MG Tab TAKE 1 TABLET(12.5 MG) BY MOUTH EVERY DAY    HYDROcodone-acetaminophen (NORCO) 5-325 mg per tablet  Take 1 tablet by mouth every 8 (eight) hours as needed for Pain.    hydrOXYzine (ATARAX) 50 MG tablet TAKE 1 TABLET(50 MG) BY MOUTH TWICE DAILY AS NEEDED FOR ITCHING    levothyroxine (SYNTHROID) 100 MCG tablet Take 1 tablet (100 mcg total) by mouth before breakfast.    liothyronine (CYTOMEL) 5 MCG Tab Take 1 tablet (5 mcg total) by mouth once daily.    meloxicam (MOBIC) 7.5 MG tablet TAKE 1 TO 2 TABLETS(7.5 TO 15 MG) BY MOUTH DAILY AS NEEDED FOR ARTHRITIS    methocarbamoL (ROBAXIN) 750 MG Tab TAKE 1 TABLET(750 MG) BY MOUTH TWICE DAILY AS NEEDED    nortriptyline (PAMELOR) 75 MG Cap Take 2 capsules (150 mg total) by mouth every evening.    potassium chloride (K-TAB) 20 mEq TAKE 1 TABLET(20 MEQ) BY MOUTH EVERY DAY    promethazine (PHENERGAN) 25 MG tablet TAKE 1 TABLET(25 MG) BY MOUTH EVERY 6 HOURS AS NEEDED FOR NAUSEA OR VOMITING    propranoloL (INDERAL) 20 MG tablet TAKE 1 TABLET(20 MG) BY MOUTH TWICE DAILY    rosuvastatin (CRESTOR) 40 MG Tab Take 1 tablet (40 mg total) by mouth every evening.   Last reviewed on 11/2/2021  8:47 AM by Eleno Delacruz MA    Review of patient's allergies indicates:   Allergen Reactions    Influenza vaccine tri-sp 09-10 Swelling    Last reviewed on  1/6/2022 4:23 PM by Andi Dhillon      Tasks added this encounter   No tasks added.   Tasks due within next 3 months   1/20/2022 - Refill Call (Auto Added)     [facility] - Specialty Pharmacy  [address] [facility] [address]  Phone: [phone]  Fax: [phone]

## 2022-02-23 DIAGNOSIS — D84.9 IMMUNOSUPPRESSED STATUS: ICD-10-CM

## 2022-02-24 ENCOUNTER — PATIENT MESSAGE (OUTPATIENT)
Dept: PHARMACY | Facility: CLINIC | Age: 57
End: 2022-02-24
Payer: MEDICAID

## 2022-03-01 DIAGNOSIS — M47.817 SPONDYLOSIS OF LUMBOSACRAL REGION WITHOUT MYELOPATHY OR RADICULOPATHY: ICD-10-CM

## 2022-03-02 ENCOUNTER — SPECIALTY PHARMACY (OUTPATIENT)
Dept: PHARMACY | Facility: CLINIC | Age: 57
End: 2022-03-02
Payer: MEDICAID

## 2022-03-02 NOTE — TELEPHONE ENCOUNTER
Specialty Pharmacy - Refill Coordination    Specialty Medication Orders Linked to Encounter    Flowsheet Row Most Recent Value   Medication #1 etanercept (ENBREL SURECLICK) 50 mg/mL (1 mL) (Order#[id number], Rx#[id number])          Refill Questions - Documented Responses    Flowsheet Row Most Recent Value   Patient Availability and HIPAA Verification    Does patient want to proceed with activity? Yes   HIPAA/medical authority confirmed? Yes   Relationship to patient of person spoken to? Self   Refill Screening Questions    Changes to allergies? No   Changes to medications? No   New conditions since last clinic visit? No   Unplanned office visit, urgent care, ED, or hospital admission in the last 4 weeks? No   How does patient/caregiver feel medication is working? Excellent   Financial problems or insurance changes? No   How many doses of your specialty medications were missed in the last 4 weeks? 0   Why were doses missed? Simply forgot   Would patient like to speak to a pharmacist? No   When does the patient need to receive the medication? 03/04/22   Refill Delivery Questions    How will the patient receive the medication? Delivery Carolyn   When does the patient need to receive the medication? 03/04/22   Shipping Address Home   Address in [facility] confirmed and updated if neccessary? Yes   Expected Copay ($) 0   Is the patient able to afford the medication copay? Yes   Payment Method zero copay   Days supply of Refill 28   Supplies needed? No supplies needed   Refill activity completed? Yes   Refill activity plan Refill scheduled   Shipment/Pickup Date: 03/04/22          Current Outpatient Medications   Medication Sig    cyanocobalamin 1,000 mcg/mL injection ADMINISTER 1 ML IN THE MUSCLE 3 TIMES A WEEK    etanercept (ENBREL SURECLICK) 50 mg/mL (1 mL) Inject 1 mL (50 mg total) into the skin once a week.    hydroCHLOROthiazide (HYDRODIURIL) 12.5 MG Tab TAKE 1 TABLET(12.5 MG) BY MOUTH EVERY DAY     HYDROcodone-acetaminophen (NORCO) 5-325 mg per tablet Take 1 tablet by mouth every 8 (eight) hours as needed for Pain.    hydrOXYzine (ATARAX) 50 MG tablet TAKE 1 TABLET(50 MG) BY MOUTH TWICE DAILY AS NEEDED FOR ITCHING    levothyroxine (SYNTHROID) 100 MCG tablet Take 1 tablet (100 mcg total) by mouth before breakfast.    liothyronine (CYTOMEL) 5 MCG Tab Take 1 tablet (5 mcg total) by mouth once daily.    meloxicam (MOBIC) 7.5 MG tablet TAKE 1 TO 2 TABLETS(7.5 TO 15 MG) BY MOUTH DAILY AS NEEDED FOR ARTHRITIS    methocarbamoL (ROBAXIN) 750 MG Tab TAKE 1 TABLET(750 MG) BY MOUTH TWICE DAILY AS NEEDED    nortriptyline (PAMELOR) 75 MG Cap Take 2 capsules (150 mg total) by mouth every evening.    potassium chloride (K-TAB) 20 mEq TAKE 1 TABLET(20 MEQ) BY MOUTH EVERY DAY    promethazine (PHENERGAN) 25 MG tablet TAKE 1 TABLET(25 MG) BY MOUTH EVERY 6 HOURS AS NEEDED FOR NAUSEA OR VOMITING    propranoloL (INDERAL) 20 MG tablet TAKE 1 TABLET(20 MG) BY MOUTH TWICE DAILY    rosuvastatin (CRESTOR) 40 MG Tab Take 1 tablet (40 mg total) by mouth every evening.   Last reviewed on 11/2/2021  8:47 AM by Eleno Delacruz MA    Review of patient's allergies indicates:   Allergen Reactions    Influenza vaccine tri-sp 09-10 Swelling    Last reviewed on  2/1/2022 4:01 PM by Kina Reddy      Tasks added this encounter   3/25/2022 - Refill Call (Auto Added)   Tasks due within next 3 months   No tasks due.     Yoel Corcoran, PharmD  [facility] - Specialty Pharmacy  [address] [facility] [address]  Phone: [phone]  Fax: [phone]

## 2022-03-03 ENCOUNTER — PATIENT MESSAGE (OUTPATIENT)
Dept: RHEUMATOLOGY | Facility: CLINIC | Age: 57
End: 2022-03-03
Payer: MEDICAID

## 2022-03-03 RX ORDER — HYDROCODONE BITARTRATE AND ACETAMINOPHEN 5; 325 MG/1; MG/1
1 TABLET ORAL EVERY 8 HOURS PRN
Qty: 80 TABLET | Refills: 0 | Status: SHIPPED | OUTPATIENT
Start: 2022-03-03 | End: 2022-03-30 | Stop reason: SDUPTHER

## 2022-03-25 ENCOUNTER — PATIENT MESSAGE (OUTPATIENT)
Dept: PHARMACY | Facility: CLINIC | Age: 57
End: 2022-03-25
Payer: MEDICAID

## 2022-03-25 ENCOUNTER — SPECIALTY PHARMACY (OUTPATIENT)
Dept: PHARMACY | Facility: CLINIC | Age: 57
End: 2022-03-25
Payer: MEDICAID

## 2022-03-25 NOTE — TELEPHONE ENCOUNTER
Specialty Pharmacy - Refill Coordination    Specialty Medication Orders Linked to Encounter    Flowsheet Row Most Recent Value   Medication #1 etanercept (ENBREL SURECLICK) 50 mg/mL (1 mL) (Order#[id number], Rx#[id number])          Refill Questions - Documented Responses    Flowsheet Row Most Recent Value   Patient Availability and HIPAA Verification    Does patient want to proceed with activity? Yes   HIPAA/medical authority confirmed? Yes   Relationship to patient of person spoken to? Self   Refill Screening Questions    Changes to allergies? No   Changes to medications? No   New conditions since last clinic visit? No   Unplanned office visit, urgent care, ED, or hospital admission in the last 4 weeks? No   How does patient/caregiver feel medication is working? Good   Financial problems or insurance changes? No   How many doses of your specialty medications were missed in the last 4 weeks? 0   Would patient like to speak to a pharmacist? No   When does the patient need to receive the medication? 03/31/22   Refill Delivery Questions    How will the patient receive the medication? Delivery Carolyn   When does the patient need to receive the medication? 03/31/22   Shipping Address Home   Address in [facility] confirmed and updated if neccessary? Yes   Expected Copay ($) 0   Is the patient able to afford the medication copay? Yes   Payment Method zero copay   Days supply of Refill 28   Supplies needed? No supplies needed   Refill activity completed? Yes   Refill activity plan Refill scheduled   Shipment/Pickup Date: 03/29/22          Current Outpatient Medications   Medication Sig    cyanocobalamin 1,000 mcg/mL injection ADMINISTER 1 ML IN THE MUSCLE 3 TIMES A WEEK    etanercept (ENBREL SURECLICK) 50 mg/mL (1 mL) Inject 1 mL (50 mg total) into the skin once a week.    hydroCHLOROthiazide (HYDRODIURIL) 12.5 MG Tab TAKE 1 TABLET(12.5 MG) BY MOUTH EVERY DAY    HYDROcodone-acetaminophen (NORCO) 5-325 mg per tablet  Take 1 tablet by mouth every 8 (eight) hours as needed for Pain.    hydrOXYzine (ATARAX) 50 MG tablet TAKE 1 TABLET(50 MG) BY MOUTH TWICE DAILY AS NEEDED FOR ITCHING    levothyroxine (SYNTHROID) 100 MCG tablet Take 1 tablet (100 mcg total) by mouth before breakfast.    liothyronine (CYTOMEL) 5 MCG Tab Take 1 tablet (5 mcg total) by mouth once daily.    meloxicam (MOBIC) 7.5 MG tablet TAKE 1 TO 2 TABLETS(7.5 TO 15 MG) BY MOUTH DAILY AS NEEDED FOR ARTHRITIS    methocarbamoL (ROBAXIN) 750 MG Tab TAKE 1 TABLET(750 MG) BY MOUTH TWICE DAILY AS NEEDED    nortriptyline (PAMELOR) 75 MG Cap Take 2 capsules (150 mg total) by mouth every evening.    potassium chloride (K-TAB) 20 mEq TAKE 1 TABLET(20 MEQ) BY MOUTH EVERY DAY    promethazine (PHENERGAN) 25 MG tablet TAKE 1 TABLET(25 MG) BY MOUTH EVERY 6 HOURS AS NEEDED FOR NAUSEA OR VOMITING    propranoloL (INDERAL) 20 MG tablet TAKE 1 TABLET(20 MG) BY MOUTH TWICE DAILY    rosuvastatin (CRESTOR) 40 MG Tab Take 1 tablet (40 mg total) by mouth every evening.   Last reviewed on 11/2/2021  8:47 AM by Eleno Delacruz MA    Review of patient's allergies indicates:   Allergen Reactions    Influenza vaccine tri-sp 09-10 Swelling    Last reviewed on  2/1/2022 4:01 PM by Kina Reddy      Tasks added this encounter   4/21/2022 - Refill Call (Auto Added)   Tasks due within next 3 months   No tasks due.     [facility] - Specialty Pharmacy  [address] [facility] [address]  Phone: [phone]  Fax: [phone]

## 2022-03-30 ENCOUNTER — OFFICE VISIT (OUTPATIENT)
Dept: RHEUMATOLOGY | Facility: CLINIC | Age: 57
End: 2022-03-30
Payer: MEDICAID

## 2022-03-30 VITALS
BODY MASS INDEX: 24.33 KG/M2 | HEART RATE: 76 BPM | SYSTOLIC BLOOD PRESSURE: 127 MMHG | DIASTOLIC BLOOD PRESSURE: 83 MMHG | HEIGHT: 69 IN | WEIGHT: 164.25 LBS

## 2022-03-30 DIAGNOSIS — M47.817 SPONDYLOSIS OF LUMBOSACRAL REGION WITHOUT MYELOPATHY OR RADICULOPATHY: ICD-10-CM

## 2022-03-30 DIAGNOSIS — M06.00 SERONEGATIVE RHEUMATOID ARTHRITIS: Primary | ICD-10-CM

## 2022-03-30 DIAGNOSIS — M79.7 FIBROMYALGIA: Chronic | ICD-10-CM

## 2022-03-30 DIAGNOSIS — Z79.899 HIGH RISK MEDICATION USE: Chronic | ICD-10-CM

## 2022-03-30 PROCEDURE — 3008F PR BODY MASS INDEX (BMI) DOCUMENTED: ICD-10-PCS | Mod: CPTII,,, | Performed by: INTERNAL MEDICINE

## 2022-03-30 PROCEDURE — 1160F PR REVIEW ALL MEDS BY PRESCRIBER/CLIN PHARMACIST DOCUMENTED: ICD-10-PCS | Mod: CPTII,,, | Performed by: INTERNAL MEDICINE

## 2022-03-30 PROCEDURE — 3008F BODY MASS INDEX DOCD: CPT | Mod: CPTII,,, | Performed by: INTERNAL MEDICINE

## 2022-03-30 PROCEDURE — 99214 PR OFFICE/OUTPT VISIT, EST, LEVL IV, 30-39 MIN: ICD-10-PCS | Mod: S$PBB,,, | Performed by: INTERNAL MEDICINE

## 2022-03-30 PROCEDURE — 1159F MED LIST DOCD IN RCRD: CPT | Mod: CPTII,,, | Performed by: INTERNAL MEDICINE

## 2022-03-30 PROCEDURE — 99214 OFFICE O/P EST MOD 30 MIN: CPT | Mod: S$PBB,,, | Performed by: INTERNAL MEDICINE

## 2022-03-30 PROCEDURE — 3074F SYST BP LT 130 MM HG: CPT | Mod: CPTII,,, | Performed by: INTERNAL MEDICINE

## 2022-03-30 PROCEDURE — 99214 OFFICE O/P EST MOD 30 MIN: CPT | Mod: PBBFAC | Performed by: INTERNAL MEDICINE

## 2022-03-30 PROCEDURE — 99999 PR PBB SHADOW E&M-EST. PATIENT-LVL IV: ICD-10-PCS | Mod: PBBFAC,,, | Performed by: INTERNAL MEDICINE

## 2022-03-30 PROCEDURE — 3079F PR MOST RECENT DIASTOLIC BLOOD PRESSURE 80-89 MM HG: ICD-10-PCS | Mod: CPTII,,, | Performed by: INTERNAL MEDICINE

## 2022-03-30 PROCEDURE — 3074F PR MOST RECENT SYSTOLIC BLOOD PRESSURE < 130 MM HG: ICD-10-PCS | Mod: CPTII,,, | Performed by: INTERNAL MEDICINE

## 2022-03-30 PROCEDURE — 99999 PR PBB SHADOW E&M-EST. PATIENT-LVL IV: CPT | Mod: PBBFAC,,, | Performed by: INTERNAL MEDICINE

## 2022-03-30 PROCEDURE — 1160F RVW MEDS BY RX/DR IN RCRD: CPT | Mod: CPTII,,, | Performed by: INTERNAL MEDICINE

## 2022-03-30 PROCEDURE — 1159F PR MEDICATION LIST DOCUMENTED IN MEDICAL RECORD: ICD-10-PCS | Mod: CPTII,,, | Performed by: INTERNAL MEDICINE

## 2022-03-30 PROCEDURE — 3079F DIAST BP 80-89 MM HG: CPT | Mod: CPTII,,, | Performed by: INTERNAL MEDICINE

## 2022-03-30 RX ORDER — HYDROCODONE BITARTRATE AND ACETAMINOPHEN 5; 325 MG/1; MG/1
1 TABLET ORAL EVERY 8 HOURS PRN
Qty: 80 TABLET | Refills: 0 | Status: SHIPPED | OUTPATIENT
Start: 2022-03-30 | End: 2022-05-02 | Stop reason: SDUPTHER

## 2022-03-30 RX ORDER — METHOCARBAMOL 750 MG/1
750 TABLET, FILM COATED ORAL 2 TIMES DAILY PRN
Qty: 60 TABLET | Refills: 2 | Status: SHIPPED | OUTPATIENT
Start: 2022-04-02 | End: 2022-06-16

## 2022-03-30 ASSESSMENT — ROUTINE ASSESSMENT OF PATIENT INDEX DATA (RAPID3)
PAIN SCORE: 5
MDHAQ FUNCTION SCORE: 0.7
TOTAL RAPID3 SCORE: 2.61
PSYCHOLOGICAL DISTRESS SCORE: 1.1
FATIGUE SCORE: 1
AM STIFFNESS SCORE: 1, YES
PATIENT GLOBAL ASSESSMENT SCORE: 0.5
WHEN YOU AWAKENED IN THE MORNING OVER THE LAST WEEK, PLEASE INDICATE THE AMOUNT OF TIME IT TAKES UNTIL YOU ARE AS LIMBER AS YOU WILL BE FOR THE DAY: 1 HOUR
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
## 2022-03-30 NOTE — PROGRESS NOTES
Rapid3 Question Responses and Scores 3/29/2022   MDHAQ Score 0.7   Psychologic Score 1.1   Pain Score 5   When you awakened in the morning OVER THE LAST WEEK, did you feel stiff? Yes   If Yes, please indicate the number of hours until you are as limber as you will be for the day 1   Fatigue Score 1   Global Health Score 0.5   RAPID3 Score 2.61     Answers for HPI/ROS submitted by the patient on 3/29/2022  fever: No  eye redness: No  mouth sores: No  headaches: No  shortness of breath: No  chest pain: No  trouble swallowing: No  diarrhea: No  constipation: No  unexpected weight change: No  genital sore: No  dysuria: No  During the last 3 days, have you had a skin rash?: No  Bruises or bleeds easily: No  cough: No

## 2022-04-18 ENCOUNTER — PATIENT MESSAGE (OUTPATIENT)
Dept: ADMINISTRATIVE | Facility: OTHER | Age: 57
End: 2022-04-18
Payer: MEDICAID

## 2022-04-21 ENCOUNTER — PATIENT MESSAGE (OUTPATIENT)
Dept: PHARMACY | Facility: CLINIC | Age: 57
End: 2022-04-21
Payer: MEDICAID

## 2022-04-21 ENCOUNTER — PATIENT MESSAGE (OUTPATIENT)
Dept: RHEUMATOLOGY | Facility: CLINIC | Age: 57
End: 2022-04-21
Payer: MEDICAID

## 2022-04-21 ENCOUNTER — SPECIALTY PHARMACY (OUTPATIENT)
Dept: PHARMACY | Facility: CLINIC | Age: 57
End: 2022-04-21
Payer: MEDICAID

## 2022-04-21 DIAGNOSIS — M06.09 RHEUMATOID ARTHRITIS OF MULTIPLE SITES WITH NEGATIVE RHEUMATOID FACTOR: ICD-10-CM

## 2022-04-21 RX ORDER — ETANERCEPT 50 MG/ML
50 SOLUTION SUBCUTANEOUS WEEKLY
Qty: 4 ML | Refills: 2 | Status: CANCELLED | OUTPATIENT
Start: 2022-04-21

## 2022-04-21 NOTE — TELEPHONE ENCOUNTER
Patient returned call for Enbrel refill. She was informed the refill request was denied because she needs to complete labs first. Patient states she is scheduled for 4/28. She has one dose on hand for today but will need shipment for dose on 4/28. Discussed once she completes the labs, we can re-fax for a new Rx, and then schedule refill. Patient verbalized understanding. Routing assigned [facility] to follow up accordingly.

## 2022-04-28 ENCOUNTER — SPECIALTY PHARMACY (OUTPATIENT)
Dept: PHARMACY | Facility: CLINIC | Age: 57
End: 2022-04-28
Payer: MEDICAID

## 2022-04-28 ENCOUNTER — TELEPHONE (OUTPATIENT)
Dept: RHEUMATOLOGY | Facility: CLINIC | Age: 57
End: 2022-04-28
Payer: MEDICAID

## 2022-04-28 DIAGNOSIS — M06.09 RHEUMATOID ARTHRITIS OF MULTIPLE SITES WITH NEGATIVE RHEUMATOID FACTOR: ICD-10-CM

## 2022-04-28 RX ORDER — ETANERCEPT 50 MG/ML
50 SOLUTION SUBCUTANEOUS
Qty: 4 ML | Refills: 0 | Status: SHIPPED | OUTPATIENT
Start: 2022-04-28 | End: 2022-06-17 | Stop reason: SDUPTHER

## 2022-04-28 RX ORDER — ETANERCEPT 50 MG/ML
50 SOLUTION SUBCUTANEOUS WEEKLY
Qty: 4 ML | Refills: 0 | OUTPATIENT
Start: 2022-04-28 | End: 2022-04-28 | Stop reason: ALTCHOICE

## 2022-04-28 RX ORDER — ETANERCEPT 50 MG/ML
50 SOLUTION SUBCUTANEOUS
Qty: 4 ML | Refills: 0 | Status: SHIPPED | OUTPATIENT
Start: 2022-04-28 | End: 2022-04-28 | Stop reason: ALTCHOICE

## 2022-04-28 RX ORDER — ETANERCEPT 50 MG/ML
50 SOLUTION SUBCUTANEOUS WEEKLY
Qty: 4 ML | Refills: 0 | Status: SHIPPED | OUTPATIENT
Start: 2022-04-28 | End: 2022-04-28 | Stop reason: SDUPTHER

## 2022-04-28 NOTE — TELEPHONE ENCOUNTER
Enbrel refills sent to [facility] for patient to see if she would like them transferred to [facility].

## 2022-04-28 NOTE — TELEPHONE ENCOUNTER
Specialty Pharmacy - Refill Coordination    Specialty Medication Orders Linked to Encounter    Flowsheet Row Most Recent Value   Medication #1 entanercept (ENBREL) 50 mg/mL injection (Order#[id number], Rx#[id number])          Refill Questions - Documented Responses    Flowsheet Row Most Recent Value   Patient Availability and HIPAA Verification    Does patient want to proceed with activity? Yes   HIPAA/medical authority confirmed? Yes   Relationship to patient of person spoken to? Self   Refill Screening Questions    Changes to allergies? No   Changes to medications? No   New conditions since last clinic visit? No   Unplanned office visit, urgent care, ED, or hospital admission in the last 4 weeks? No   How does patient/caregiver feel medication is working? Good   Financial problems or insurance changes? No   How many doses of your specialty medications were missed in the last 4 weeks? 0   Would patient like to speak to a pharmacist? No   When does the patient need to receive the medication? 04/28/22   Refill Delivery Questions    How will the patient receive the medication? Delivery Carolyn   When does the patient need to receive the medication? 04/28/22   Shipping Address Home   Address in [facility] confirmed and updated if neccessary? Yes   Expected Copay ($) 0   Is the patient able to afford the medication copay? Yes   Payment Method zero copay   Days supply of Refill 28   Supplies needed? No supplies needed   Refill activity completed? Yes   Refill activity plan Refill scheduled   Shipment/Pickup Date: 05/02/22          Current Outpatient Medications   Medication Sig    cyanocobalamin 1,000 mcg/mL injection ADMINISTER 1 ML IN THE MUSCLE 3 TIMES A WEEK    entanercept (ENBREL) 50 mg/mL injection Inject 1 mL (50 mg total) into the skin every 7 days.    etanercept (ENBREL SURECLICK) 50 mg/mL (1 mL) Inject 1 mL (50 mg total) into the skin every 7 days.    hydroCHLOROthiazide (HYDRODIURIL) 12.5 MG Tab TAKE 1  TABLET(12.5 MG) BY MOUTH EVERY DAY    HYDROcodone-acetaminophen (NORCO) 5-325 mg per tablet Take 1 tablet by mouth every 8 (eight) hours as needed for Pain.    hydrOXYzine (ATARAX) 50 MG tablet TAKE 1 TABLET(50 MG) BY MOUTH TWICE DAILY AS NEEDED FOR ITCHING    levothyroxine (SYNTHROID) 100 MCG tablet Take 1 tablet (100 mcg total) by mouth before breakfast.    liothyronine (CYTOMEL) 5 MCG Tab Take 1 tablet (5 mcg total) by mouth once daily.    meloxicam (MOBIC) 7.5 MG tablet TAKE 1 TO 2 TABLETS(7.5 TO 15 MG) BY MOUTH DAILY AS NEEDED FOR ARTHRITIS    methocarbamoL (ROBAXIN) 750 MG Tab Take 1 tablet (750 mg total) by mouth 2 (two) times daily as needed (pain).    nortriptyline (PAMELOR) 75 MG Cap Take 2 capsules (150 mg total) by mouth every evening.    potassium chloride (K-TAB) 20 mEq TAKE 1 TABLET(20 MEQ) BY MOUTH EVERY DAY    promethazine (PHENERGAN) 25 MG tablet TAKE 1 TABLET(25 MG) BY MOUTH EVERY 6 HOURS AS NEEDED FOR NAUSEA OR VOMITING    propranoloL (INDERAL) 20 MG tablet TAKE 1 TABLET(20 MG) BY MOUTH TWICE DAILY    rosuvastatin (CRESTOR) 40 MG Tab Take 1 tablet (40 mg total) by mouth every evening.   Last reviewed on 3/30/2022  2:30 PM by Andi Dhillon MD    Review of patient's allergies indicates:   Allergen Reactions    Influenza vaccine tri-sp 09-10 Swelling    Last reviewed on  4/28/2022 2:16 PM by Frida Garsia      Tasks added this encounter   No tasks added.   Tasks due within next 3 months   4/21/2022 - Refill Call (Auto Added)     Elliott Mckeon  [facility] - Specialty Pharmacy  [address] [facility] [address]  Phone: [phone]  Fax: [phone]

## 2022-04-28 NOTE — TELEPHONE ENCOUNTER
Called to inform patient of lab results as per Dr. Garsia.  Please let patient know her kidney function is worse and to stop mobic. Tell her since she is on fluid pill HCTZ to discuss her kidney function with pcp.   Tell her also she was more anemic than last time so make sure she follows this up with pcp.   Patient informed and verbalizes understanding.

## 2022-04-28 NOTE — TELEPHONE ENCOUNTER
Pt wants to fill Enbrel at [facility].  She was due for dose today. Agreed to call back once [facility] has Rx on file.

## 2022-05-02 DIAGNOSIS — M47.817 SPONDYLOSIS OF LUMBOSACRAL REGION WITHOUT MYELOPATHY OR RADICULOPATHY: ICD-10-CM

## 2022-05-03 ENCOUNTER — PATIENT MESSAGE (OUTPATIENT)
Dept: RHEUMATOLOGY | Facility: CLINIC | Age: 57
End: 2022-05-03
Payer: MEDICAID

## 2022-05-03 RX ORDER — HYDROCODONE BITARTRATE AND ACETAMINOPHEN 5; 325 MG/1; MG/1
1 TABLET ORAL EVERY 8 HOURS PRN
Qty: 80 TABLET | Refills: 0 | Status: SHIPPED | OUTPATIENT
Start: 2022-05-03 | End: 2022-06-02 | Stop reason: SDUPTHER

## 2022-05-19 RX ORDER — ETANERCEPT 50 MG/ML
50 SOLUTION SUBCUTANEOUS
Qty: 4 ML | Refills: 0 | Status: SHIPPED | OUTPATIENT
Start: 2022-05-19 | End: 2022-06-21 | Stop reason: SDUPTHER

## 2022-05-24 ENCOUNTER — SPECIALTY PHARMACY (OUTPATIENT)
Dept: PHARMACY | Facility: CLINIC | Age: 57
End: 2022-05-24
Payer: MEDICAID

## 2022-05-24 NOTE — TELEPHONE ENCOUNTER
Specialty Pharmacy - Refill Coordination    Specialty Medication Orders Linked to Encounter    Flowsheet Row Most Recent Value   Medication #1 etanercept (ENBREL SURECLICK) 50 mg/mL (1 mL) (Order#[id number], Rx#[id number])          Refill Questions - Documented Responses    Flowsheet Row Most Recent Value   Patient Availability and HIPAA Verification    Does patient want to proceed with activity? Yes   HIPAA/medical authority confirmed? Yes   Relationship to patient of person spoken to? Self   Refill Screening Questions    Changes to allergies? No   Changes to medications? No   New conditions since last clinic visit? No   Unplanned office visit, urgent care, ED, or hospital admission in the last 4 weeks? No   How does patient/caregiver feel medication is working? Good   Financial problems or insurance changes? No   How many doses of your specialty medications were missed in the last 4 weeks? 0   Would patient like to speak to a pharmacist? No   When does the patient need to receive the medication? 05/31/22   Refill Delivery Questions    How will the patient receive the medication? Delivery Carolyn   When does the patient need to receive the medication? 05/31/22   Shipping Address Home   Address in [facility] confirmed and updated if neccessary? Yes   Expected Copay ($) 0   Is the patient able to afford the medication copay? Yes   Payment Method zero copay   Days supply of Refill 28   Supplies needed? No supplies needed   Refill activity completed? Yes   Refill activity plan Refill scheduled   Shipment/Pickup Date: 05/27/22          Current Outpatient Medications   Medication Sig    b complex vitamins (B COMPLEX-VITAMIN B12) tablet Take 1 tablet by mouth once daily.    cyanocobalamin 1,000 mcg/mL injection ADMINISTER 1 ML IN THE MUSCLE 3 TIMES A WEEK    etanercept (ENBREL SURECLICK) 50 mg/mL (1 mL) Inject 1 mL (50 mg total) into the skin every 7 days.    etanercept (ENBREL SURECLICK) 50 mg/mL (1 mL) Inject 1  mL (50 mg total) into the skin every 7 days.    hydroCHLOROthiazide (HYDRODIURIL) 12.5 MG Tab TAKE 1 TABLET(12.5 MG) BY MOUTH EVERY DAY    HYDROcodone-acetaminophen (NORCO) 5-325 mg per tablet Take 1 tablet by mouth every 8 (eight) hours as needed for Pain.    hydrOXYzine (ATARAX) 50 MG tablet TAKE 1 TABLET(50 MG) BY MOUTH TWICE DAILY AS NEEDED FOR ITCHING    levothyroxine (SYNTHROID) 100 MCG tablet Take 1 tablet (100 mcg total) by mouth before breakfast.    liothyronine (CYTOMEL) 5 MCG Tab Take 1 tablet (5 mcg total) by mouth once daily.    methocarbamoL (ROBAXIN) 750 MG Tab Take 1 tablet (750 mg total) by mouth 2 (two) times daily as needed (pain).    nortriptyline (PAMELOR) 75 MG Cap Take 2 capsules (150 mg total) by mouth every evening.    promethazine (PHENERGAN) 25 MG tablet TAKE 1 TABLET(25 MG) BY MOUTH EVERY 6 HOURS AS NEEDED FOR NAUSEA OR VOMITING    propranoloL (INDERAL) 20 MG tablet Take 1 tablet (20 mg total) by mouth 2 (two) times a day.    rosuvastatin (CRESTOR) 40 MG Tab Take 1 tablet (40 mg total) by mouth every evening.   Last reviewed on 5/3/2022  8:40 AM by Milo Grijalva MA    Review of patient's allergies indicates:   Allergen Reactions    Influenza vaccine tri-sp 09-10 Swelling    Last reviewed on  5/3/2022 8:39 AM by Milo Grijalva      Tasks added this encounter   6/21/2022 - Refill Call (Auto Added)   Tasks due within next 3 months   No tasks due.     Lina Darling, Patient Care Assistant  [facility]  [address] [facility] [address]  Phone: [phone]  Fax: [phone]

## 2022-06-02 DIAGNOSIS — M47.817 SPONDYLOSIS OF LUMBOSACRAL REGION WITHOUT MYELOPATHY OR RADICULOPATHY: ICD-10-CM

## 2022-06-02 RX ORDER — HYDROCODONE BITARTRATE AND ACETAMINOPHEN 5; 325 MG/1; MG/1
1 TABLET ORAL EVERY 8 HOURS PRN
Qty: 80 TABLET | Refills: 0 | Status: SHIPPED | OUTPATIENT
Start: 2022-06-02 | End: 2022-07-01 | Stop reason: SDUPTHER

## 2022-06-17 ENCOUNTER — SPECIALTY PHARMACY (OUTPATIENT)
Dept: PHARMACY | Facility: CLINIC | Age: 57
End: 2022-06-17
Payer: MEDICAID

## 2022-06-17 NOTE — TELEPHONE ENCOUNTER
Specialty Pharmacy - Clinical Reassessment    Specialty Medication Orders Linked to Encounter    Flowsheet Row Most Recent Value   Medication #1 etanercept (ENBREL SURECLICK) 50 mg/mL (1 mL) (Order#[id number], Rx#[id number])        Patient Diagnosis   M06.00 - Seronegative rheumatoid arthritis    Specialty clinical pharmacist review completed for an annual review of reassessment. Reviewed the following areas: current med list, reports of adverse effects, adherence and progress towards therapeutic goals.    Recommendations: none at this time.    Tasks added this encounter   3/17/2023 - Clinical - Follow Up Assesement (Annual)   Tasks due within next 3 months   6/21/2022 - Refill Call (Auto Added)     Amanda Miranda, PharmD  [facility]  [address]  [facility] [address]  Phone: [phone]  Fax: [phone]

## 2022-06-21 ENCOUNTER — PATIENT MESSAGE (OUTPATIENT)
Dept: PHARMACY | Facility: CLINIC | Age: 57
End: 2022-06-21
Payer: MEDICAID

## 2022-06-21 RX ORDER — ETANERCEPT 50 MG/ML
50 SOLUTION SUBCUTANEOUS
Qty: 4 ML | Refills: 2 | Status: SHIPPED | OUTPATIENT
Start: 2022-06-21 | End: 2022-09-16 | Stop reason: SDUPTHER

## 2022-06-22 ENCOUNTER — SPECIALTY PHARMACY (OUTPATIENT)
Dept: PHARMACY | Facility: CLINIC | Age: 57
End: 2022-06-22
Payer: MEDICAID

## 2022-06-22 NOTE — TELEPHONE ENCOUNTER
Specialty Pharmacy - Refill Coordination    Specialty Medication Orders Linked to Encounter    Flowsheet Row Most Recent Value   Medication #1 etanercept (ENBREL SURECLICK) 50 mg/mL (1 mL) (Order#[id number], Rx#[id number])          Refill Questions - Documented Responses    Flowsheet Row Most Recent Value   Patient Availability and HIPAA Verification    Does patient want to proceed with activity? Yes   HIPAA/medical authority confirmed? Yes   Relationship to patient of person spoken to? Self   Refill Screening Questions    Changes to allergies? No   Changes to medications? No   New conditions since last clinic visit? No   Unplanned office visit, urgent care, ED, or hospital admission in the last 4 weeks? No   How does patient/caregiver feel medication is working? Good   Financial problems or insurance changes? No   How many doses of your specialty medications were missed in the last 4 weeks? 0   Would patient like to speak to a pharmacist? No   When does the patient need to receive the medication? 06/28/22   Refill Delivery Questions    How will the patient receive the medication? Delivery Carolyn   When does the patient need to receive the medication? 06/28/22   Shipping Address Home   Address in [facility] confirmed and updated if neccessary? Yes   Expected Copay ($) 0   Is the patient able to afford the medication copay? Yes   Payment Method zero copay   Days supply of Refill 28   Supplies needed? No supplies needed   Refill activity completed? Yes   Refill activity plan Refill scheduled   Shipment/Pickup Date: 06/24/22          Current Outpatient Medications   Medication Sig    cyanocobalamin 1,000 mcg/mL injection ADMINISTER 1 ML IN THE MUSCLE 3 TIMES A WEEK    etanercept (ENBREL SURECLICK) 50 mg/mL (1 mL) Inject 1 mL (50 mg total) into the skin every 7 days.    hydroCHLOROthiazide (HYDRODIURIL) 12.5 MG Tab TAKE 1 TABLET(12.5 MG) BY MOUTH EVERY DAY    HYDROcodone-acetaminophen (NORCO) 5-325 mg per  tablet Take 1 tablet by mouth every 8 (eight) hours as needed for Pain.    hydrOXYzine (ATARAX) 50 MG tablet TAKE 1 TABLET(50 MG) BY MOUTH TWICE DAILY AS NEEDED FOR ITCHING    levothyroxine (SYNTHROID) 100 MCG tablet Take 1 tablet (100 mcg total) by mouth before breakfast.    liothyronine (CYTOMEL) 5 MCG Tab Take 1 tablet (5 mcg total) by mouth once daily.    methocarbamoL (ROBAXIN) 750 MG Tab TAKE 1 TABLET(750 MG) BY MOUTH TWICE DAILY AS NEEDED FOR PAIN    nortriptyline (PAMELOR) 75 MG Cap Take 2 capsules (150 mg total) by mouth every evening.    promethazine (PHENERGAN) 25 MG tablet TAKE 1 TABLET(25 MG) BY MOUTH EVERY 6 HOURS AS NEEDED FOR NAUSEA OR VOMITING    propranoloL (INDERAL) 20 MG tablet Take 1 tablet (20 mg total) by mouth 2 (two) times a day.    rosuvastatin (CRESTOR) 40 MG Tab Take 1 tablet (40 mg total) by mouth every evening.   Last reviewed on 5/3/2022  8:40 AM by Milo Grijalva MA    Review of patient's allergies indicates:   Allergen Reactions    Influenza vaccine tri-sp 09-10 Swelling    Last reviewed on  5/3/2022 8:39 AM by Milo Grijalva      Tasks added this encounter   7/19/2022 - Refill Call (Auto Added)   Tasks due within next 3 months   No tasks due.     [facility]  [address] [place] [address]  Phone: [phone]  Fax: [phone]

## 2022-07-01 ENCOUNTER — PATIENT MESSAGE (OUTPATIENT)
Dept: RHEUMATOLOGY | Facility: CLINIC | Age: 57
End: 2022-07-01
Payer: MEDICAID

## 2022-07-01 DIAGNOSIS — M47.817 SPONDYLOSIS OF LUMBOSACRAL REGION WITHOUT MYELOPATHY OR RADICULOPATHY: ICD-10-CM

## 2022-07-01 RX ORDER — HYDROCODONE BITARTRATE AND ACETAMINOPHEN 5; 325 MG/1; MG/1
1 TABLET ORAL EVERY 8 HOURS PRN
Qty: 80 TABLET | Refills: 0 | Status: SHIPPED | OUTPATIENT
Start: 2022-07-01 | End: 2022-08-02 | Stop reason: SDUPTHER

## 2022-07-19 ENCOUNTER — PATIENT MESSAGE (OUTPATIENT)
Dept: PHARMACY | Facility: CLINIC | Age: 57
End: 2022-07-19
Payer: MEDICAID

## 2022-07-20 ENCOUNTER — SPECIALTY PHARMACY (OUTPATIENT)
Dept: PHARMACY | Facility: CLINIC | Age: 57
End: 2022-07-20
Payer: MEDICAID

## 2022-07-20 NOTE — TELEPHONE ENCOUNTER
Specialty Pharmacy - Refill Coordination    Specialty Medication Orders Linked to Encounter    Flowsheet Row Most Recent Value   Medication #1 etanercept (ENBREL SURECLICK) 50 mg/mL (1 mL) (Order#[id number], Rx#[id number])          Refill Questions - Documented Responses    Flowsheet Row Most Recent Value   Patient Availability and HIPAA Verification    Does patient want to proceed with activity? Yes   HIPAA/medical authority confirmed? Yes   Relationship to patient of person spoken to? Self   Refill Screening Questions    Changes to allergies? No   Changes to medications? No   New conditions since last clinic visit? No   Unplanned office visit, urgent care, ED, or hospital admission in the last 4 weeks? No   How does patient/caregiver feel medication is working? Very good   Financial problems or insurance changes? No   How many doses of your specialty medications were missed in the last 4 weeks? 0   Would patient like to speak to a pharmacist? No   When does the patient need to receive the medication? 07/26/22   Refill Delivery Questions    How will the patient receive the medication? Mail   When does the patient need to receive the medication? 07/26/22   Shipping Address Home   Address in [facility] confirmed and updated if neccessary? Yes   Expected Copay ($) 0   Is the patient able to afford the medication copay? Yes   Payment Method zero copay   Days supply of Refill 28   Supplies needed? No supplies needed   Refill activity completed? Yes   Refill activity plan Refill scheduled   Shipment/Pickup Date: 07/21/22          Current Outpatient Medications   Medication Sig    cyanocobalamin 1,000 mcg/mL injection ADMINISTER 1 ML IN THE MUSCLE 3 TIMES A WEEK    etanercept (ENBREL SURECLICK) 50 mg/mL (1 mL) Inject 1 mL (50 mg total) into the skin every 7 days.    hydroCHLOROthiazide (HYDRODIURIL) 12.5 MG Tab TAKE 1 TABLET(12.5 MG) BY MOUTH EVERY DAY    HYDROcodone-acetaminophen (NORCO) 5-325 mg per tablet  Take 1 tablet by mouth every 8 (eight) hours as needed for Pain.    hydrOXYzine (ATARAX) 50 MG tablet TAKE 1 TABLET(50 MG) BY MOUTH TWICE DAILY AS NEEDED FOR ITCHING    levothyroxine (SYNTHROID) 88 MCG tablet Take 1 tablet (88 mcg total) by mouth before breakfast.    liothyronine (CYTOMEL) 5 MCG Tab Take 1 tablet (5 mcg total) by mouth once daily.    methocarbamoL (ROBAXIN) 750 MG Tab Take 1 tablet (750 mg total) by mouth 2 (two) times daily as needed (muscle pain).    nortriptyline (PAMELOR) 75 MG Cap Take 2 capsules (150 mg total) by mouth every evening.    promethazine (PHENERGAN) 25 MG tablet TAKE 1 TABLET(25 MG) BY MOUTH EVERY 6 HOURS AS NEEDED FOR NAUSEA OR VOMITING    propranoloL (INDERAL) 20 MG tablet Take 1 tablet (20 mg total) by mouth 2 (two) times a day.    rosuvastatin (CRESTOR) 40 MG Tab Take 1 tablet (40 mg total) by mouth every evening.   Last reviewed on 5/3/2022  8:40 AM by Milo Grijalva MA    Review of patient's allergies indicates:   Allergen Reactions    Influenza vaccine tri-sp 09-10 Swelling    Last reviewed on  7/6/2022 4:14 PM by Shaneka Ceja      Tasks added this encounter   8/16/2022 - Refill Call (Auto Added)   Tasks due within next 3 months   No tasks due.     Chai Menchaca, PharmD  [facility]  [address] [facility] [address]  Phone: [phone]  Fax: [phone]

## 2022-08-02 ENCOUNTER — PATIENT MESSAGE (OUTPATIENT)
Dept: RHEUMATOLOGY | Facility: CLINIC | Age: 57
End: 2022-08-02
Payer: MEDICAID

## 2022-08-02 DIAGNOSIS — M47.817 SPONDYLOSIS OF LUMBOSACRAL REGION WITHOUT MYELOPATHY OR RADICULOPATHY: ICD-10-CM

## 2022-08-02 RX ORDER — HYDROCODONE BITARTRATE AND ACETAMINOPHEN 5; 325 MG/1; MG/1
1 TABLET ORAL EVERY 8 HOURS PRN
Qty: 80 TABLET | Refills: 0 | Status: SHIPPED | OUTPATIENT
Start: 2022-08-02 | End: 2022-09-01 | Stop reason: SDUPTHER

## 2022-08-16 ENCOUNTER — SPECIALTY PHARMACY (OUTPATIENT)
Dept: PHARMACY | Facility: CLINIC | Age: 57
End: 2022-08-16
Payer: MEDICAID

## 2022-08-16 NOTE — TELEPHONE ENCOUNTER
Specialty Pharmacy - Refill Coordination    Specialty Medication Orders Linked to Encounter    Flowsheet Row Most Recent Value   Medication #1 etanercept (ENBREL SURECLICK) 50 mg/mL (1 mL) (Order#[id number], Rx#[id number])          Refill Questions - Documented Responses    Flowsheet Row Most Recent Value   Patient Availability and HIPAA Verification    Does patient want to proceed with activity? Yes   HIPAA/medical authority confirmed? Yes   Relationship to patient of person spoken to? Self   Refill Screening Questions    Changes to allergies? No   Changes to medications? No   New conditions since last clinic visit? No   Unplanned office visit, urgent care, ED, or hospital admission in the last 4 weeks? No   How does patient/caregiver feel medication is working? Very good   Financial problems or insurance changes? No   How many doses of your specialty medications were missed in the last 4 weeks? 0   Would patient like to speak to a pharmacist? No   When does the patient need to receive the medication? 08/23/22   Refill Delivery Questions    How will the patient receive the medication? Delivery Carolyn   When does the patient need to receive the medication? 08/23/22   Shipping Address Home   Address in [facility] confirmed and updated if neccessary? Yes   Expected Copay ($) 0   Is the patient able to afford the medication copay? Yes   Payment Method zero copay   Days supply of Refill 28   Supplies needed? No supplies needed   Refill activity completed? Yes   Refill activity plan Refill scheduled   Shipment/Pickup Date: 08/17/22          Current Outpatient Medications   Medication Sig    cyanocobalamin 1,000 mcg/mL injection ADMINISTER 1 ML IN THE MUSCLE 3 TIMES A WEEK    etanercept (ENBREL SURECLICK) 50 mg/mL (1 mL) Inject 1 mL (50 mg total) into the skin every 7 days.    hydroCHLOROthiazide (HYDRODIURIL) 12.5 MG Tab TAKE 1 TABLET(12.5 MG) BY MOUTH EVERY DAY    HYDROcodone-acetaminophen (NORCO) 5-325 mg per  tablet Take 1 tablet by mouth every 8 (eight) hours as needed for Pain.    hydrOXYzine (ATARAX) 50 MG tablet TAKE 1 TABLET(50 MG) BY MOUTH TWICE DAILY AS NEEDED FOR ITCHING    levothyroxine (SYNTHROID) 88 MCG tablet Take 1 tablet (88 mcg total) by mouth before breakfast.    liothyronine (CYTOMEL) 5 MCG Tab Take 1 tablet (5 mcg total) by mouth once daily.    methocarbamoL (ROBAXIN) 750 MG Tab Take 1 tablet (750 mg total) by mouth 2 (two) times daily as needed (muscle pain).    nortriptyline (PAMELOR) 75 MG Cap Take 2 capsules (150 mg total) by mouth every evening.    promethazine (PHENERGAN) 25 MG tablet TAKE 1 TABLET(25 MG) BY MOUTH EVERY 6 HOURS AS NEEDED FOR NAUSEA OR VOMITING    propranoloL (INDERAL) 20 MG tablet Take 1 tablet (20 mg total) by mouth 2 (two) times a day.    rosuvastatin (CRESTOR) 40 MG Tab Take 1 tablet (40 mg total) by mouth every evening.   Last reviewed on 5/3/2022  8:40 AM by Milo Grijalva MA    Review of patient's allergies indicates:   Allergen Reactions    Influenza vaccine tri-sp 09-10 Swelling    Last reviewed on  8/2/2022 8:13 PM by Andi Dhillon      Tasks added this encounter   9/13/2022 - Refill Call (Auto Added)   Tasks due within next 3 months   No tasks due.     Essence Banuelos, PharmD  [facility]  [address] [place] [address]  Phone: [phone]  Fax: [phone]

## 2022-09-01 ENCOUNTER — PATIENT MESSAGE (OUTPATIENT)
Dept: RHEUMATOLOGY | Facility: CLINIC | Age: 57
End: 2022-09-01
Payer: MEDICAID

## 2022-09-01 DIAGNOSIS — M47.817 SPONDYLOSIS OF LUMBOSACRAL REGION WITHOUT MYELOPATHY OR RADICULOPATHY: ICD-10-CM

## 2022-09-02 RX ORDER — HYDROCODONE BITARTRATE AND ACETAMINOPHEN 5; 325 MG/1; MG/1
1 TABLET ORAL EVERY 8 HOURS PRN
Qty: 80 TABLET | Refills: 0 | Status: SHIPPED | OUTPATIENT
Start: 2022-09-02 | End: 2022-10-01 | Stop reason: SDUPTHER

## 2022-09-13 ENCOUNTER — PATIENT MESSAGE (OUTPATIENT)
Dept: PHARMACY | Facility: CLINIC | Age: 57
End: 2022-09-13
Payer: MEDICAID

## 2022-09-16 ENCOUNTER — SPECIALTY PHARMACY (OUTPATIENT)
Dept: PHARMACY | Facility: CLINIC | Age: 57
End: 2022-09-16
Payer: MEDICAID

## 2022-09-16 RX ORDER — ETANERCEPT 50 MG/ML
50 SOLUTION SUBCUTANEOUS
Qty: 4 ML | Refills: 2 | Status: SHIPPED | OUTPATIENT
Start: 2022-09-16 | End: 2022-11-30 | Stop reason: SDUPTHER

## 2022-09-16 NOTE — TELEPHONE ENCOUNTER
Incoming call from patient for Enbrel refill and delivery. Current Rx is out of refills. Refill request sent to Dr. SUSANNA Dhillon. [facility] will call patient back once new Rx is received. Next dose is due on Tuesday (9/20/22).

## 2022-09-19 ENCOUNTER — PATIENT MESSAGE (OUTPATIENT)
Dept: PHARMACY | Facility: CLINIC | Age: 57
End: 2022-09-19
Payer: MEDICAID

## 2022-09-19 NOTE — TELEPHONE ENCOUNTER
DOCUMENTATION ONLY:  Prior authorization for Enbrel approved from 9/19/22 to 9/19/23    Case Id: n/a    Co-pay: 0.00    Medicaid, FA not required.

## 2022-09-19 NOTE — TELEPHONE ENCOUNTER
Specialty Pharmacy - Refill Coordination    Specialty Medication Orders Linked to Encounter      Flowsheet Row Most Recent Value   Medication #1 etanercept (ENBREL SURECLICK) 50 mg/mL (1 mL) (Order#[id number], Rx#[id number])            Refill Questions - Documented Responses      Flowsheet Row Most Recent Value   Patient Availability and HIPAA Verification    Does patient want to proceed with activity? Yes   HIPAA/medical authority confirmed? Yes   Relationship to patient of person spoken to? Self   Refill Screening Questions    Changes to allergies? No   Changes to medications? No   New conditions since last clinic visit? No   Unplanned office visit, urgent care, ED, or hospital admission in the last 4 weeks? No   How does patient/caregiver feel medication is working? Very good   Financial problems or insurance changes? No   How many doses of your specialty medications were missed in the last 4 weeks? 0   Would patient like to speak to a pharmacist? No   When does the patient need to receive the medication? 09/20/22   Refill Delivery Questions    How will the patient receive the medication? Delivery Carolyn   When does the patient need to receive the medication? 09/20/22   Shipping Address Home   Address in [facility] confirmed and updated if neccessary? Yes   Expected Copay ($) 0   Is the patient able to afford the medication copay? Yes   Payment Method zero copay   Days supply of Refill 28   Supplies needed? --  [Injection Kit]   Refill activity completed? Yes   Refill activity plan Refill scheduled   Shipment/Pickup Date: 09/19/22            Current Outpatient Medications   Medication Sig    cyanocobalamin 1,000 mcg/mL injection ADMINISTER 1 ML IN THE MUSCLE 3 TIMES A WEEK    etanercept (ENBREL SURECLICK) 50 mg/mL (1 mL) Inject 1 mL (50 mg total) into the skin every 7 days.    hydroCHLOROthiazide (HYDRODIURIL) 12.5 MG Tab TAKE 1 TABLET(12.5 MG) BY MOUTH EVERY DAY    HYDROcodone-acetaminophen (NORCO) 5-325 mg  per tablet Take 1 tablet by mouth every 8 (eight) hours as needed for Pain.    hydrOXYzine (ATARAX) 50 MG tablet TAKE 1 TABLET(50 MG) BY MOUTH TWICE DAILY AS NEEDED FOR ITCHING    levothyroxine (SYNTHROID) 88 MCG tablet Take 1 tablet (88 mcg total) by mouth before breakfast.    liothyronine (CYTOMEL) 5 MCG Tab Take 1 tablet (5 mcg total) by mouth once daily.    methocarbamoL (ROBAXIN) 750 MG Tab TAKE 1 TABLET(750 MG) BY MOUTH TWICE DAILY AS NEEDED FOR MUSCLE PAIN    nortriptyline (PAMELOR) 75 MG Cap Take 2 capsules (150 mg total) by mouth every evening.    promethazine (PHENERGAN) 25 MG tablet TAKE 1 TABLET(25 MG) BY MOUTH EVERY 6 HOURS AS NEEDED FOR NAUSEA OR VOMITING    propranoloL (INDERAL) 20 MG tablet Take 1 tablet (20 mg total) by mouth 2 (two) times a day.    rosuvastatin (CRESTOR) 40 MG Tab Take 1 tablet (40 mg total) by mouth every evening.   Last reviewed on 5/3/2022  8:40 AM by Milo Grijalva MA    Review of patient's allergies indicates:   Allergen Reactions    Influenza vaccine tri-sp 09-10 Swelling    Last reviewed on  9/2/2022 8:15 AM by Andi Dhillon      Tasks added this encounter   10/11/2022 - Refill Call (Auto Added)   Tasks due within next 3 months   No tasks due.     Audelia Navarro, PharmD  [facility]  [address] [place] [address]  Phone: [phone]  Fax: [phone]

## 2022-09-30 ENCOUNTER — PATIENT MESSAGE (OUTPATIENT)
Dept: RHEUMATOLOGY | Facility: CLINIC | Age: 57
End: 2022-09-30
Payer: MEDICAID

## 2022-10-14 ENCOUNTER — SPECIALTY PHARMACY (OUTPATIENT)
Dept: PHARMACY | Facility: CLINIC | Age: 57
End: 2022-10-14
Payer: MEDICAID

## 2022-10-14 ENCOUNTER — PATIENT MESSAGE (OUTPATIENT)
Dept: PHARMACY | Facility: CLINIC | Age: 57
End: 2022-10-14
Payer: MEDICAID

## 2022-10-14 NOTE — TELEPHONE ENCOUNTER
Specialty Pharmacy - Medication/Referral Authorization  Specialty Pharmacy - Refill Coordination    Specialty Medication Orders Linked to Encounter      Flowsheet Row Most Recent Value   Medication #1 etanercept (ENBREL SURECLICK) 50 mg/mL (1 mL) (Order#[id number], Rx#[id number])            Refill Questions - Documented Responses      Flowsheet Row Most Recent Value   Refill Screening Questions    Changes to allergies? No   Changes to medications? No   New conditions since last clinic visit? No   Unplanned office visit, urgent care, ED, or hospital admission in the last 4 weeks? No   How does patient/caregiver feel medication is working? Good   Financial problems or insurance changes? No   How many doses of your specialty medications were missed in the last 4 weeks? 0   Would patient like to speak to a pharmacist? No   When does the patient need to receive the medication? 10/18/22   Refill Delivery Questions    How will the patient receive the medication? MEDRx   When does the patient need to receive the medication? 10/18/22   Shipping Address Home   Address in [facility] confirmed and updated if neccessary? Yes   Expected Copay ($) 0   Is the patient able to afford the medication copay? Yes   Payment Method zero copay   Days supply of Refill 28   Supplies needed? No supplies needed   Refill activity completed? Yes   Refill activity plan Refill scheduled   Shipment/Pickup Date: 10/17/22            Current Outpatient Medications   Medication Sig    cyanocobalamin 1,000 mcg/mL injection ADMINISTER 1 ML IN THE MUSCLE 3 TIMES A WEEK    etanercept (ENBREL SURECLICK) 50 mg/mL (1 mL) Inject 1 mL (50 mg total) into the skin every 7 days.    hydroCHLOROthiazide (HYDRODIURIL) 12.5 MG Tab TAKE 1 TABLET(12.5 MG) BY MOUTH EVERY DAY    HYDROcodone-acetaminophen (NORCO) 5-325 mg per tablet Take 1 tablet by mouth every 8 (eight) hours as needed for Pain.    hydrOXYzine (ATARAX) 50 MG tablet TAKE 1 TABLET(50 MG) BY MOUTH  TWICE DAILY AS NEEDED FOR ITCHING    levothyroxine (SYNTHROID) 88 MCG tablet Take 1 tablet (88 mcg total) by mouth before breakfast.    liothyronine (CYTOMEL) 5 MCG Tab Take 1 tablet (5 mcg total) by mouth once daily.    methocarbamoL (ROBAXIN) 750 MG Tab Take 1 tablet (750 mg total) by mouth 2 (two) times daily as needed (pain).    nortriptyline (PAMELOR) 75 MG Cap Take 2 capsules (150 mg total) by mouth every evening.    promethazine (PHENERGAN) 25 MG tablet TAKE 1 TABLET(25 MG) BY MOUTH EVERY 6 HOURS AS NEEDED FOR NAUSEA OR VOMITING    propranoloL (INDERAL) 20 MG tablet Take 1 tablet (20 mg total) by mouth 2 (two) times a day.    rosuvastatin (CRESTOR) 40 MG Tab Take 1 tablet (40 mg total) by mouth every evening.   Last reviewed on 5/3/2022  8:40 AM by Milo Grijalva MA    Review of patient's allergies indicates:   Allergen Reactions    Influenza vaccine tri-sp 09-10 Swelling    Last reviewed on  9/2/2022 8:15 AM by Andi Dhillon      Tasks added this encounter   11/8/2022 - Refill Call (Auto Added)   Tasks due within next 3 months   No tasks due.     Shara Lovell, PharmD  [facility]  [address]  [facility] [address]  Phone: [phone]  Fax: [phone]

## 2022-10-14 NOTE — TELEPHONE ENCOUNTER
PA not required. PA already on file, approved 9/19/22-9/19/23. The PA was approved with the wrong NDC for 3.92 ml. Rep was able to update the PA to the correct NDC and paid claim for $0.     Outgoing call to pt to schedule refill but rings and then hangs up. Called 3 times. Will call back on Monday and send [employer] message.

## 2022-10-14 NOTE — TELEPHONE ENCOUNTER
Incoming call from pt returning [facility]'s phone call. Pt needed refill of Enbrel. Pt is due on 10/18. PA required.

## 2022-10-20 ENCOUNTER — OFFICE VISIT (OUTPATIENT)
Dept: NEUROLOGY | Facility: CLINIC | Age: 57
End: 2022-10-20
Payer: MEDICAID

## 2022-10-20 VITALS
RESPIRATION RATE: 14 BRPM | HEIGHT: 69 IN | DIASTOLIC BLOOD PRESSURE: 80 MMHG | BODY MASS INDEX: 25.91 KG/M2 | WEIGHT: 174.94 LBS | HEART RATE: 86 BPM | SYSTOLIC BLOOD PRESSURE: 132 MMHG

## 2022-10-20 DIAGNOSIS — G89.29 CHRONIC NONINTRACTABLE HEADACHE, UNSPECIFIED HEADACHE TYPE: Primary | ICD-10-CM

## 2022-10-20 DIAGNOSIS — M79.7 FIBROMYALGIA: Chronic | ICD-10-CM

## 2022-10-20 DIAGNOSIS — L29.9 ITCHING: ICD-10-CM

## 2022-10-20 DIAGNOSIS — I10 PRIMARY HYPERTENSION: Chronic | ICD-10-CM

## 2022-10-20 DIAGNOSIS — G47.00 INSOMNIA, UNSPECIFIED TYPE: Chronic | ICD-10-CM

## 2022-10-20 DIAGNOSIS — M06.00 SERONEGATIVE RHEUMATOID ARTHRITIS: ICD-10-CM

## 2022-10-20 DIAGNOSIS — G47.33 OSA (OBSTRUCTIVE SLEEP APNEA): ICD-10-CM

## 2022-10-20 DIAGNOSIS — E78.5 HYPERLIPIDEMIA, UNSPECIFIED HYPERLIPIDEMIA TYPE: ICD-10-CM

## 2022-10-20 DIAGNOSIS — E03.4 HYPOTHYROIDISM DUE TO ACQUIRED ATROPHY OF THYROID: Chronic | ICD-10-CM

## 2022-10-20 DIAGNOSIS — D51.8 OTHER VITAMIN B12 DEFICIENCY ANEMIA: ICD-10-CM

## 2022-10-20 DIAGNOSIS — R51.9 CHRONIC NONINTRACTABLE HEADACHE, UNSPECIFIED HEADACHE TYPE: Primary | ICD-10-CM

## 2022-10-20 PROCEDURE — 1159F PR MEDICATION LIST DOCUMENTED IN MEDICAL RECORD: ICD-10-PCS | Mod: CPTII,,, | Performed by: NURSE PRACTITIONER

## 2022-10-20 PROCEDURE — 99214 OFFICE O/P EST MOD 30 MIN: CPT | Mod: PBBFAC | Performed by: NURSE PRACTITIONER

## 2022-10-20 PROCEDURE — 1160F RVW MEDS BY RX/DR IN RCRD: CPT | Mod: CPTII,,, | Performed by: NURSE PRACTITIONER

## 2022-10-20 PROCEDURE — 1159F MED LIST DOCD IN RCRD: CPT | Mod: CPTII,,, | Performed by: NURSE PRACTITIONER

## 2022-10-20 PROCEDURE — 3075F PR MOST RECENT SYSTOLIC BLOOD PRESS GE 130-139MM HG: ICD-10-PCS | Mod: CPTII,,, | Performed by: NURSE PRACTITIONER

## 2022-10-20 PROCEDURE — 99214 OFFICE O/P EST MOD 30 MIN: CPT | Mod: S$PBB,,, | Performed by: NURSE PRACTITIONER

## 2022-10-20 PROCEDURE — 3079F DIAST BP 80-89 MM HG: CPT | Mod: CPTII,,, | Performed by: NURSE PRACTITIONER

## 2022-10-20 PROCEDURE — 3044F HG A1C LEVEL LT 7.0%: CPT | Mod: CPTII,,, | Performed by: NURSE PRACTITIONER

## 2022-10-20 PROCEDURE — 99214 PR OFFICE/OUTPT VISIT, EST, LEVL IV, 30-39 MIN: ICD-10-PCS | Mod: S$PBB,,, | Performed by: NURSE PRACTITIONER

## 2022-10-20 PROCEDURE — 3044F PR MOST RECENT HEMOGLOBIN A1C LEVEL <7.0%: ICD-10-PCS | Mod: CPTII,,, | Performed by: NURSE PRACTITIONER

## 2022-10-20 PROCEDURE — 99999 PR PBB SHADOW E&M-EST. PATIENT-LVL IV: CPT | Mod: PBBFAC,,, | Performed by: NURSE PRACTITIONER

## 2022-10-20 PROCEDURE — 3075F SYST BP GE 130 - 139MM HG: CPT | Mod: CPTII,,, | Performed by: NURSE PRACTITIONER

## 2022-10-20 PROCEDURE — 3079F PR MOST RECENT DIASTOLIC BLOOD PRESSURE 80-89 MM HG: ICD-10-PCS | Mod: CPTII,,, | Performed by: NURSE PRACTITIONER

## 2022-10-20 PROCEDURE — 1160F PR REVIEW ALL MEDS BY PRESCRIBER/CLIN PHARMACIST DOCUMENTED: ICD-10-PCS | Mod: CPTII,,, | Performed by: NURSE PRACTITIONER

## 2022-10-20 PROCEDURE — 99999 PR PBB SHADOW E&M-EST. PATIENT-LVL IV: ICD-10-PCS | Mod: PBBFAC,,, | Performed by: NURSE PRACTITIONER

## 2022-10-20 RX ORDER — HYDROXYZINE HYDROCHLORIDE 50 MG/1
TABLET, FILM COATED ORAL
Qty: 60 TABLET | Refills: 2 | Status: SHIPPED | OUTPATIENT
Start: 2022-10-20 | End: 2022-11-23

## 2022-10-20 RX ORDER — NORTRIPTYLINE HYDROCHLORIDE 75 MG/1
150 CAPSULE ORAL NIGHTLY
Qty: 180 CAPSULE | Refills: 3 | Status: SHIPPED | OUTPATIENT
Start: 2022-10-20 | End: 2022-11-03

## 2022-10-20 NOTE — PROGRESS NOTES
HPI: Angela Carlson is a 57 y.o. female with migraine with aura and with  cervicogenic trigger possibly, although MRI C spine with only minor changes. Some complicated migraine with some alteration of consciousness at times-resolved. Anxiety and depression. She has TREY. Symptoms of lumbar radicular pain down the left leg with MRI showing disc bulge and nerve root displacement at S1 on the left 2015. She has HTN and hypothyroidism, as well as fibromyalgia. She sees Rheumatology for seronegative RA.     She is here today for her annual follow up. CPAP titration was completed in 11/2021. She still has not received a CPAP.     She has daytime fatigue.     Her headaches are well controlled, and only rare. She takes Pamelor.     She has insomnia, despite taking high dose Pamelor.     She is also on Propranolol for her HTN from Endocrinology, whom she sees for her thyroid issues. BP normal today.     Her back pain is well controlled. Fibro and RA pain currently well controlled on current treatment. She sees Rheumatology at [facility].     She is rarely using Atarax as needed and same with Phenergan (not daily).     She lives in [place].     Review of Systems   Constitutional:  Negative for malaise/fatigue.   Eyes:  Negative for blurred vision and double vision.   Cardiovascular:  Negative for palpitations and leg swelling.   Musculoskeletal:  Positive for myalgias. Negative for falls, joint pain and neck pain.   Skin:  Negative for rash.   Neurological:  Negative for dizziness, tingling, sensory change, weakness and headaches.   Endo/Heme/Allergies:  Does not bruise/bleed easily.   Psychiatric/Behavioral:  Negative for depression and memory loss. The patient has insomnia. The patient is not nervous/anxious.    Exam:   Gen Appearance, well developed/nourished in no apparent distress  CV: 2+ distal pulses with no edema or swelling  Neuro:  MS: Awake, alert, Sustains attention. Recent/remote memory intact, Language is full to  spontaneous speech and comprehension.  Fund of Knowledge is full  CN: Optic discs are flat with normal vasculature, PERRL, Extraoccular movements and visual fields are full. Normal facial sensation and strength, Hearing symmetric, Tongue and Palate are midline and strong. Shoulder Shrug symmetric and strong.  Motor: Normal bulk, tone, no abnormal movements. 5/5 strength bilateral upper/lower extremities with 2+ reflexes  Sensory: Romberg negative and intact to temp, vibration  Cerebellar: Finger-nose, Rapid alternating movements intact  Gait: Normal stance, no ataxia    Labs:   CBC normal in 2/2017  2018 B12 just above the normal range  2019 CBC reviewed    Assessment/Recommendation: Angela Carlson is a 57 y.o. female with migraine with aura and with  cervicogenic trigger possibly, although MRI C spine with only minor changes. Some complicated migraine with some alteration of consciousness at times-resolved. Anxiety and depression. She has  TREY.   Symptoms of lumbar radicular pain down the left leg with MRI showing disc bulge and nerve root displacement at S1 on the left 2015.     I recommend:   1. Saw spine surgery prior-conservative measures recommended. Back pain is currently episodic and responds to rest.     2. Regarding Fibromyalgia and headache prevention: She is using Pamelor  (higher dose per rheumatology prior) for prevention of pain.   -Off of Gabapentin now, due to nausea, which resolved after cessation.   -She tried Lyrica and Zonegran prior and she is off of Cymbalta now.     3. Can continue Atarax PRN ( don't use atarax with Phenergan) for anxiety. Phenergan for nausea associated with headache- rare use only recommended. Watch for sedation with meds.  - Rheumatology also managing hydrocodone for pain with RA.     4. She completed another CPAP titration study in 11/2021, which showed sleep disordered breathing. The results suggested repeat study in person vs trial of CPAP.   -Order CPAP with supplies  today. Have patient notify me in one month if not received. There have been supply issues with patients receiving CPAP, due to prior machine recalls.     Continue CPAP for TREY. Order CPAP titration study to order updated supplies, as last study was several years ago.     5. Prior spells resolved/ could have been complicated migraine. Headaches are greatly improved.     6. She has switched from B12 injections to oral B12 supplementation recently.     7. She takes high dose Pamelor without relief, as well as other sedating agents. She failed Melatonin. She slept better with her CPAP prior, but she may discuss ongoing insomnia with PCP.     RTC 1 year

## 2022-10-24 ENCOUNTER — PATIENT MESSAGE (OUTPATIENT)
Dept: RHEUMATOLOGY | Facility: CLINIC | Age: 57
End: 2022-10-24
Payer: MEDICAID

## 2022-10-24 DIAGNOSIS — U07.1 ACUTE COVID-19: Primary | ICD-10-CM

## 2022-10-26 ENCOUNTER — PATIENT MESSAGE (OUTPATIENT)
Dept: RHEUMATOLOGY | Facility: CLINIC | Age: 57
End: 2022-10-26
Payer: MEDICAID

## 2022-10-27 ENCOUNTER — TELEPHONE (OUTPATIENT)
Dept: NEUROLOGY | Facility: CLINIC | Age: 57
End: 2022-10-27
Payer: MEDICAID

## 2022-10-27 DIAGNOSIS — G47.33 OSA (OBSTRUCTIVE SLEEP APNEA): Primary | ICD-10-CM

## 2022-10-27 NOTE — TELEPHONE ENCOUNTER
New CPAP order placed    ----- Message from Lolis Boothe LPN sent at 10/27/2022 10:53 AM CDT -----  Regarding: FW: Bk  Contact: [phone]    ----- Message -----  From: Ailyn Ceron  Sent: 10/27/2022  10:47 AM CDT  To: oLlis Boothe LPN  Subject: RE: AutoP[place]aac                                      If you're not able to update then please place another order with a standard tubing (). The insurance will not cover the Heated Tubing (). Thanks!  ----- Message -----  From: Lolis Boothe LPN  Sent: 10/27/2022  10:38 AM CDT  To: Ailyn Ceron  Subject: FW: AutoPap                                        ----- Message -----  From: Helen Desai NP  Sent: 10/27/2022  10:10 AM CDT  To: Lolis Boothe LPN  Subject: RE: Bk                                      Can they do a verbal order for this? I'm not sure if standard tubing is an option when ordering CPAP. I am fine with approving this change.   ----- Message -----  From: Lolis Boothe LPN  Sent: 10/26/2022   4:47 PM CDT  To: Helen Desai NP  Subject: FW: AutoPap                                        ----- Message -----  From: Ailyn Ceron  Sent: 10/26/2022   4:43 PM CDT  To: Giselle Cervantes Staff  Subject: AutoPap                                          Good evening! Please update the Rx to include standard tubing (), patient's insurance does not cover a heated tubing (). Thanks! Kassie

## 2022-11-08 ENCOUNTER — SPECIALTY PHARMACY (OUTPATIENT)
Dept: PHARMACY | Facility: CLINIC | Age: 57
End: 2022-11-08
Payer: MEDICAID

## 2022-11-08 NOTE — TELEPHONE ENCOUNTER
Specialty Pharmacy - Refill Coordination    Specialty Medication Orders Linked to Encounter      Flowsheet Row Most Recent Value   Medication #1 etanercept (ENBREL SURECLICK) 50 mg/mL (1 mL) (Order#[id number], Rx#[id number])            Refill Questions - Documented Responses      Flowsheet Row Most Recent Value   Patient Availability and HIPAA Verification    Does patient want to proceed with activity? Yes   HIPAA/medical authority confirmed? Yes   Relationship to patient of person spoken to? Self   Refill Screening Questions    Changes to allergies? No   Changes to medications? No   New conditions since last clinic visit? No   Unplanned office visit, urgent care, ED, or hospital admission in the last 4 weeks? No   How does patient/caregiver feel medication is working? Good   Financial problems or insurance changes? No   How many doses of your specialty medications were missed in the last 4 weeks? 0   Would patient like to speak to a pharmacist? No   When does the patient need to receive the medication? 11/10/22   Refill Delivery Questions    How will the patient receive the medication? MEDRx   When does the patient need to receive the medication? 11/10/22   Shipping Address Home   Address in [facility] confirmed and updated if neccessary? Yes   Expected Copay ($) 0   Is the patient able to afford the medication copay? Yes   Payment Method zero copay   Days supply of Refill 28   Supplies needed? No supplies needed   Refill activity completed? Yes   Refill activity plan Refill scheduled   Shipment/Pickup Date: 11/09/22            Current Outpatient Medications   Medication Sig    etanercept (ENBREL SURECLICK) 50 mg/mL (1 mL) Inject 1 mL (50 mg total) into the skin every 7 days.    hydroCHLOROthiazide (HYDRODIURIL) 12.5 MG Tab TAKE 1 TABLET(12.5 MG) BY MOUTH EVERY DAY    HYDROcodone-acetaminophen (NORCO) 5-325 mg per tablet Take 1 tablet by mouth every 8 (eight) hours as needed for Pain.    hydrOXYzine (ATARAX)  50 MG tablet TAKE 1 TABLET(50 MG) BY MOUTH TWICE DAILY AS NEEDED FOR ITCHING  Strength: 50 mg    levothyroxine (SYNTHROID) 88 MCG tablet Take 1 tablet (88 mcg total) by mouth before breakfast.    liothyronine (CYTOMEL) 5 MCG Tab TAKE 1 TABLET(5 MCG) BY MOUTH EVERY DAY    methocarbamoL (ROBAXIN) 750 MG Tab Take 1 tablet (750 mg total) by mouth 2 (two) times daily as needed (pain).    nortriptyline (PAMELOR) 75 MG Cap TAKE 2 CAPSULES(150 MG) BY MOUTH EVERY EVENING    pravastatin (PRAVACHOL) 40 MG tablet Take 1 tablet (40 mg total) by mouth every evening.    promethazine (PHENERGAN) 25 MG tablet TAKE 1 TABLET(25 MG) BY MOUTH EVERY 6 HOURS AS NEEDED FOR NAUSEA OR VOMITING    propranoloL (INDERAL) 20 MG tablet Take 1 tablet (20 mg total) by mouth 2 (two) times a day.    rosuvastatin (CRESTOR) 40 MG Tab Take 1 tablet (40 mg total) by mouth every evening.   Last reviewed on 10/25/2022  8:36 PM by Andi Dhillon MD    Review of patient's allergies indicates:   Allergen Reactions    Influenza vaccine tri-sp 09-10 Swelling    Last reviewed on  10/25/2022 8:35 PM by Andi Dhillon      Tasks added this encounter   12/1/2022 - Refill Call (Auto Added)   Tasks due within next 3 months   No tasks due.     [facility] - Specialty Pharmacy  [address] [facility] [address]  Phone: [phone]  Fax: [phone]

## 2022-11-15 ENCOUNTER — PATIENT MESSAGE (OUTPATIENT)
Dept: NEUROLOGY | Facility: CLINIC | Age: 57
End: 2022-11-15
Payer: MEDICAID

## 2022-11-30 ENCOUNTER — SPECIALTY PHARMACY (OUTPATIENT)
Dept: PHARMACY | Facility: CLINIC | Age: 57
End: 2022-11-30
Payer: MEDICAID

## 2022-11-30 RX ORDER — ETANERCEPT 50 MG/ML
50 SOLUTION SUBCUTANEOUS
Qty: 4 ML | Refills: 2 | Status: SHIPPED | OUTPATIENT
Start: 2022-11-30 | End: 2023-02-28 | Stop reason: SDUPTHER

## 2022-11-30 NOTE — TELEPHONE ENCOUNTER
Incoming call from patient to request Enbrel refill. Informed Rx is out of refills and equest faxed to [facility]. Patient reports next dose on Tues 12/6. Advised we will reach out once refills are received. Patient verbalized understanding.

## 2022-12-04 ENCOUNTER — PATIENT MESSAGE (OUTPATIENT)
Dept: NEUROLOGY | Facility: CLINIC | Age: 57
End: 2022-12-04
Payer: MEDICAID

## 2022-12-04 DIAGNOSIS — G47.33 OSA (OBSTRUCTIVE SLEEP APNEA): Primary | ICD-10-CM

## 2022-12-06 NOTE — TELEPHONE ENCOUNTER
Specialty Pharmacy - Refill Coordination    Specialty Medication Orders Linked to Encounter      Flowsheet Row Most Recent Value   Medication #1 etanercept (ENBREL SURECLICK) 50 mg/mL (1 mL) (Order#[id number], Rx#[id number])            Refill Questions - Documented Responses      Flowsheet Row Most Recent Value   Patient Availability and HIPAA Verification    Does patient want to proceed with activity? Yes   HIPAA/medical authority confirmed? Yes   Relationship to patient of person spoken to? Self   Refill Screening Questions    Changes to allergies? No   Changes to medications? No   New conditions since last clinic visit? No   Unplanned office visit, urgent care, ED, or hospital admission in the last 4 weeks? No   How does patient/caregiver feel medication is working? Good   Financial problems or insurance changes? No   How many doses of your specialty medications were missed in the last 4 weeks? 0   Would patient like to speak to a pharmacist? No   When does the patient need to receive the medication? 12/06/22   Refill Delivery Questions    How will the patient receive the medication? MEDRx   When does the patient need to receive the medication? 12/06/22   Shipping Address Home   Address in [facility] confirmed and updated if neccessary? Yes   Expected Copay ($) 0   Is the patient able to afford the medication copay? Yes   Payment Method zero copay   Days supply of Refill 28   Supplies needed? No supplies needed   Refill activity completed? Yes   Refill activity plan Refill scheduled   Shipment/Pickup Date: 12/06/22            Current Outpatient Medications   Medication Sig    amLODIPine (NORVASC) 5 MG tablet Take 1 tablet (5 mg total) by mouth once daily.    etanercept (ENBREL SURECLICK) 50 mg/mL (1 mL) Inject 1 mL (50 mg total) into the skin every 7 days.    HYDROcodone-acetaminophen (NORCO) 5-325 mg per tablet Take 1 tablet by mouth every 8 (eight) hours as needed for Pain.    hydrOXYzine (ATARAX) 50 MG  tablet TAKE 1 TABLET(50 MG) BY MOUTH TWICE DAILY AS NEEDED FOR ITCHING    levothyroxine (SYNTHROID) 88 MCG tablet Take 1 tablet (88 mcg total) by mouth before breakfast.    liothyronine (CYTOMEL) 5 MCG Tab TAKE 1 TABLET(5 MCG) BY MOUTH EVERY DAY    methocarbamoL (ROBAXIN) 750 MG Tab Take 1 tablet (750 mg total) by mouth 2 (two) times daily as needed (pain).    nortriptyline (PAMELOR) 75 MG Cap TAKE 2 CAPSULES(150 MG) BY MOUTH EVERY EVENING    promethazine (PHENERGAN) 25 MG tablet TAKE 1 TABLET(25 MG) BY MOUTH EVERY 6 HOURS AS NEEDED FOR NAUSEA OR VOMITING    propranoloL (INDERAL) 20 MG tablet Take 1 tablet (20 mg total) by mouth 2 (two) times a day.    rosuvastatin (CRESTOR) 40 MG Tab Take 1 tablet (40 mg total) by mouth every evening.   Last reviewed on 11/15/2022  9:42 AM by Shaneka Ceja NP    Review of patient's allergies indicates:   Allergen Reactions    Influenza vaccine tri-sp 09-10 Swelling    Last reviewed on  11/15/2022 12:06 PM by Shaneka Ceja      Tasks added this encounter   12/27/2022 - Refill Call (Auto Added)   Tasks due within next 3 months   No tasks due.     [facility] - Specialty Pharmacy  [address] [facility] [address]  Phone: [phone]  Fax: [phone]

## 2022-12-19 ENCOUNTER — PATIENT MESSAGE (OUTPATIENT)
Dept: NEUROLOGY | Facility: CLINIC | Age: 57
End: 2022-12-19
Payer: MEDICAID

## 2022-12-22 ENCOUNTER — HOSPITAL ENCOUNTER (EMERGENCY)
Facility: HOSPITAL | Age: 57
Discharge: HOME | End: 2022-12-22
Attending: SURGERY
Payer: MEDICAID

## 2022-12-22 VITALS
HEART RATE: 101 BPM | BODY MASS INDEX: 26.4 KG/M2 | WEIGHT: 178.25 LBS | SYSTOLIC BLOOD PRESSURE: 174 MMHG | DIASTOLIC BLOOD PRESSURE: 81 MMHG | OXYGEN SATURATION: 96 % | HEIGHT: 69 IN | TEMPERATURE: 97 F | RESPIRATION RATE: 20 BRPM

## 2022-12-22 DIAGNOSIS — T63.481A INSECT STINGS, ACCIDENTAL OR UNINTENTIONAL, INITIAL ENCOUNTER: Primary | ICD-10-CM

## 2022-12-22 PROCEDURE — 99284 EMERGENCY DEPT VISIT MOD MDM: CPT

## 2022-12-22 RX ORDER — SULFAMETHOXAZOLE AND TRIMETHOPRIM 800; 160 MG/1; MG/1
1 TABLET ORAL 2 TIMES DAILY
Qty: 14 TABLET | Refills: 0 | Status: ON HOLD | OUTPATIENT
Start: 2022-12-22 | End: 2022-12-25

## 2022-12-22 RX ORDER — MUPIROCIN 20 MG/G
OINTMENT TOPICAL 3 TIMES DAILY
Qty: 15 G | Refills: 0 | Status: ON HOLD | OUTPATIENT
Start: 2022-12-22 | End: 2022-12-30 | Stop reason: HOSPADM

## 2022-12-22 NOTE — DISCHARGE INSTRUCTIONS
Apply topical cream as directed   Take antibiotics as directed   Please follow-up with primary care doctor

## 2022-12-22 NOTE — ED PROVIDER NOTES
Encounter Date: 2022       History     Chief Complaint   Patient presents with    Insect Bite     Chief complaint: Spider bite  Patient presents to the ED with redness and swelling to the dorsal aspect of the left hand after being bit by a spider 3 days ago.  She states that she did see the spider bite her was able to kill .  She is unable to Identify or described the spider that bit her. Denies any fever body aches or chills. Reports area has slowly swelled and reddened over the last 48 hours. She has full range of motion to the hand and digits and denies significant pain or loss of sensation    Review of patient's allergies indicates:   Allergen Reactions    Influenza vaccine tri-sp 09-10 Swelling     Past Medical History:   Diagnosis Date    Anemia     Anxiety     Arthritis     Depression     Fibromyalgia 2013    Headache     Heart murmur     mitral valve prolapse    Hypertension     Lupus     Neutropenia associated with autoimmune disease 2013    Sciatica     Thyroid disease      Past Surgical History:   Procedure Laterality Date     SECTION, CLASSIC      COLONOSCOPY      HYSTERECTOMY      KNEE SURGERY      SOFT PALATE REVISION      TONSILLECTOMY       Family History   Problem Relation Age of Onset    Diabetes Mother     Lupus Father     Diabetes Maternal Aunt     Diabetes Maternal Grandmother     Stroke Maternal Grandfather     Cancer Paternal Aunt         brain cancer    Lupus Paternal Aunt      Social History     Tobacco Use    Smoking status: Never    Smokeless tobacco: Never   Substance Use Topics    Alcohol use: No    Drug use: No     Review of Systems   Constitutional:  Negative for fever.   Skin:  Positive for color change and wound.   Neurological:  Negative for numbness.     Physical Exam     Initial Vitals [22 1117]   BP Pulse Resp Temp SpO2   (!) 174/81 101 20 97.1 °F (36.2 °C) 96 %      MAP       --         Physical Exam    Nursing note and vitals  reviewed.  Constitutional: She appears well-developed and well-nourished.   HENT:   Head: Normocephalic and atraumatic.   Cardiovascular:  Normal rate.           Pulmonary/Chest: Breath sounds normal.   Musculoskeletal:         General: No edema. Normal range of motion.     Neurological: She is alert and oriented to person, place, and time.   Skin: Skin is warm. There is erythema.   Erythema and induration to dorsal aspect of left hand at base of 2nd digit no fluctuance or drainage noted.  Intact sensation. good distal capillary refill   Psychiatric: She has a normal mood and affect. Thought content normal.       ED Course   Procedures  Labs Reviewed - No data to display       Imaging Results    None          Medications - No data to display  Medical Decision Making:   Differential Diagnosis:   Insect bite, allergic reaction, cellulitis, abscess  ED Management:  Area of induration and erythema dorsal aspect of left hand at base of 2nd digit   No fluctuance or drainage noted   Will start on Bactrim with topical Bactroban   Patient instructed to have re-evaluation in 48 hours given return precautions including increased swelling redness pain or drainage                        Clinical Impression:   Final diagnoses:  [T63.481A] Insect stings, accidental or unintentional, initial encounter (Primary)        ED Disposition Condition    Discharge Stable          ED Prescriptions       Medication Sig Dispense Start Date End Date Auth. Provider    sulfamethoxazole-trimethoprim 800-160mg (BACTRIM DS) 800-160 mg Tab Take 1 tablet by mouth 2 (two) times daily. for 7 days 14 tablet 12/22/2022 12/29/2022 Navya Schofield NP    mupirocin (BACTROBAN) 2 % ointment Apply topically 3 (three) times daily. for 7 days 15 g 12/22/2022 12/29/2022 Navya Schofield NP          Follow-up Information    None          Navya Schofield NP  12/22/22 [id number]

## 2022-12-25 PROBLEM — E78.5 HLD (HYPERLIPIDEMIA): Chronic | Status: ACTIVE | Noted: 2020-07-02

## 2022-12-25 PROBLEM — L03.114 CELLULITIS OF LEFT UPPER EXTREMITY: Status: ACTIVE | Noted: 2022-12-25

## 2022-12-29 ENCOUNTER — PATIENT MESSAGE (OUTPATIENT)
Dept: RHEUMATOLOGY | Facility: CLINIC | Age: 57
End: 2022-12-29
Payer: MEDICAID

## 2022-12-30 ENCOUNTER — PATIENT MESSAGE (OUTPATIENT)
Dept: RHEUMATOLOGY | Facility: CLINIC | Age: 57
End: 2022-12-30
Payer: MEDICAID

## 2022-12-31 ENCOUNTER — PATIENT MESSAGE (OUTPATIENT)
Dept: NEUROLOGY | Facility: CLINIC | Age: 57
End: 2022-12-31
Payer: MEDICAID

## 2023-01-03 ENCOUNTER — SPECIALTY PHARMACY (OUTPATIENT)
Dept: PHARMACY | Facility: CLINIC | Age: 58
End: 2023-01-03
Payer: MEDICAID

## 2023-01-03 NOTE — TELEPHONE ENCOUNTER
Specialty Pharmacy - Refill Coordination    Specialty Medication Orders Linked to Encounter      Flowsheet Row Most Recent Value   Medication #1 etanercept (ENBREL SURECLICK) 50 mg/mL (1 mL) (Order#[id number], Rx#[id number])            Refill Questions - Documented Responses      Flowsheet Row Most Recent Value   Patient Availability and HIPAA Verification    Does patient want to proceed with activity? Yes   HIPAA/medical authority confirmed? Yes   Relationship to patient of person spoken to? Self   Refill Screening Questions    Changes to allergies? No   Changes to medications? No   New conditions since last clinic visit? No   Unplanned office visit, urgent care, ED, or hospital admission in the last 4 weeks? No   How does patient/caregiver feel medication is working? Very good   Financial problems or insurance changes? No   How many doses of your specialty medications were missed in the last 4 weeks? 0   Would patient like to speak to a pharmacist? No   When does the patient need to receive the medication? 01/10/23   Refill Delivery Questions    How will the patient receive the medication? MEDRx   When does the patient need to receive the medication? 01/10/23   Shipping Address Home   Address in [facility] confirmed and updated if neccessary? Yes   Expected Copay ($) 0   Is the patient able to afford the medication copay? Yes   Payment Method zero copay   Days supply of Refill 28   Supplies needed? No supplies needed   Refill activity completed? Yes   Refill activity plan Refill scheduled   Shipment/Pickup Date: 01/05/23            Current Outpatient Medications   Medication Sig    amLODIPine (NORVASC) 5 MG tablet Take 1 tablet (5 mg total) by mouth once daily.    ciprofloxacin HCl (CIPRO) 500 MG tablet Take 1 tablet (500 mg total) by mouth every 12 (twelve) hours. for 7 days    etanercept (ENBREL SURECLICK) 50 mg/mL (1 mL) Inject 1 mL (50 mg total) into the skin every 7 days.    hydrOXYzine (ATARAX) 50 MG  tablet TAKE 1 TABLET(50 MG) BY MOUTH TWICE DAILY AS NEEDED FOR ITCHING    levothyroxine (SYNTHROID) 88 MCG tablet Take 1 tablet (88 mcg total) by mouth before breakfast.    liothyronine (CYTOMEL) 5 MCG Tab TAKE 1 TABLET(5 MCG) BY MOUTH EVERY DAY    methocarbamoL (ROBAXIN) 750 MG Tab Take 1 tablet (750 mg total) by mouth 2 (two) times daily as needed (pain).    nortriptyline (PAMELOR) 75 MG Cap TAKE 2 CAPSULES(150 MG) BY MOUTH EVERY EVENING    oxyCODONE (ROXICODONE) 5 MG immediate release tablet Take 1 tablet (5 mg total) by mouth every 6 (six) hours as needed for Pain.    promethazine (PHENERGAN) 25 MG tablet TAKE 1 TABLET(25 MG) BY MOUTH EVERY 6 HOURS AS NEEDED FOR NAUSEA OR VOMITING    propranoloL (INDERAL) 20 MG tablet Take 1 tablet (20 mg total) by mouth 2 (two) times a day.   Last reviewed on 12/29/2022  9:16 AM by Micky Gaming CRNA    Review of patient's allergies indicates:   Allergen Reactions    Influenza vaccine tri-sp 09-10 Swelling    Last reviewed on  12/29/2022 10:25 AM by Essie Soares      Tasks added this encounter   1/31/2023 - Refill Call (Auto Added)   Tasks due within next 3 months   3/17/2023 - Clinical - Follow Up Assesement (Annual)     Amanda Sheets, PharmD  [facility]  [address] [place] [id number]  Phone: [phone]  Fax: [phone]

## 2023-01-10 ENCOUNTER — PATIENT MESSAGE (OUTPATIENT)
Dept: RHEUMATOLOGY | Facility: CLINIC | Age: 58
End: 2023-01-10
Payer: MEDICAID

## 2023-01-10 DIAGNOSIS — M79.7 FIBROMYALGIA: Primary | Chronic | ICD-10-CM

## 2023-01-10 DIAGNOSIS — M47.817 SPONDYLOSIS OF LUMBOSACRAL REGION WITHOUT MYELOPATHY OR RADICULOPATHY: ICD-10-CM

## 2023-01-11 RX ORDER — HYDROCODONE BITARTRATE AND ACETAMINOPHEN 5; 325 MG/1; MG/1
1 TABLET ORAL EVERY 6 HOURS PRN
Qty: 80 TABLET | Refills: 0 | Status: SHIPPED | OUTPATIENT
Start: 2023-01-11 | End: 2023-02-09 | Stop reason: SDUPTHER

## 2023-01-31 ENCOUNTER — SPECIALTY PHARMACY (OUTPATIENT)
Dept: PHARMACY | Facility: CLINIC | Age: 58
End: 2023-01-31
Payer: MEDICAID

## 2023-01-31 NOTE — TELEPHONE ENCOUNTER
Specialty Pharmacy - Refill Coordination    Specialty Medication Orders Linked to Encounter      Flowsheet Row Most Recent Value   Medication #1 etanercept (ENBREL SURECLICK) 50 mg/mL (1 mL) (Order#[id number], Rx#[id number])            Refill Questions - Documented Responses      Flowsheet Row Most Recent Value   Patient Availability and HIPAA Verification    Does patient want to proceed with activity? Yes   HIPAA/medical authority confirmed? Yes   Relationship to patient of person spoken to? Self   Refill Screening Questions    Changes to allergies? No   Changes to medications? No   New conditions since last clinic visit? No   Unplanned office visit, urgent care, ED, or hospital admission in the last 4 weeks? No   How does patient/caregiver feel medication is working? Good   Financial problems or insurance changes? No   How many doses of your specialty medications were missed in the last 4 weeks? 0   Would patient like to speak to a pharmacist? No   When does the patient need to receive the medication? 02/07/23   Refill Delivery Questions    How will the patient receive the medication? MEDRx   When does the patient need to receive the medication? 02/07/23   Shipping Address Home   Address in [facility] confirmed and updated if neccessary? Yes   Expected Copay ($) 0   Is the patient able to afford the medication copay? Yes   Payment Method zero copay   Days supply of Refill 28   Supplies needed? No supplies needed   Refill activity completed? Yes   Refill activity plan Refill scheduled   Shipment/Pickup Date: 02/02/23            Current Outpatient Medications   Medication Sig    amLODIPine (NORVASC) 5 MG tablet Take 1 tablet (5 mg total) by mouth once daily.    etanercept (ENBREL SURECLICK) 50 mg/mL (1 mL) Inject 1 mL (50 mg total) into the skin every 7 days.    HYDROcodone-acetaminophen (NORCO) 5-325 mg per tablet Take 1 tablet by mouth every 6 (six) hours as needed for Pain.    hydrOXYzine (ATARAX) 50 MG  tablet TAKE 1 TABLET(50 MG) BY MOUTH TWICE DAILY AS NEEDED FOR ITCHING    levothyroxine (SYNTHROID) 88 MCG tablet Take 1 tablet (88 mcg total) by mouth before breakfast.    liothyronine (CYTOMEL) 5 MCG Tab TAKE 1 TABLET(5 MCG) BY MOUTH EVERY DAY    methocarbamoL (ROBAXIN) 750 MG Tab Take 1 tablet (750 mg total) by mouth 2 (two) times daily as needed (pain).    nortriptyline (PAMELOR) 75 MG Cap TAKE 2 CAPSULES(150 MG) BY MOUTH EVERY EVENING    oxyCODONE (ROXICODONE) 5 MG immediate release tablet Take 1 tablet (5 mg total) by mouth every 6 (six) hours as needed for Pain.    promethazine (PHENERGAN) 25 MG tablet TAKE 1 TABLET(25 MG) BY MOUTH EVERY 6 HOURS AS NEEDED FOR NAUSEA OR VOMITING    propranoloL (INDERAL) 20 MG tablet Take 1 tablet (20 mg total) by mouth 2 (two) times a day.   Last reviewed on 1/30/2023  5:04 PM by Bita Green RN    Review of patient's allergies indicates:   Allergen Reactions    Influenza vaccine tri-sp 09-10 Swelling    Last reviewed on  1/30/2023 5:04 PM by Bita Green      Tasks added this encounter   2/28/2023 - Refill Call (Auto Added)   Tasks due within next 3 months   3/17/2023 - Clinical - Follow Up Assesement (Annual)     [facility] - Specialty Pharm[facility]  [address] [place] [address]  Phone: [phone]  Fax: [phone]

## 2023-02-28 ENCOUNTER — SPECIALTY PHARMACY (OUTPATIENT)
Dept: PHARMACY | Facility: CLINIC | Age: 58
End: 2023-02-28
Payer: MEDICAID

## 2023-02-28 NOTE — TELEPHONE ENCOUNTER
Outgoing call regarding enbrel refill; per pt, she's due to inject on 3/7; informed her that a refill request was sent to md, and once approved [facility] will follow up to schedule delivery

## 2023-03-01 RX ORDER — ETANERCEPT 50 MG/ML
50 SOLUTION SUBCUTANEOUS
Qty: 4 ML | Refills: 2 | Status: SHIPPED | OUTPATIENT
Start: 2023-03-01 | End: 2023-04-24 | Stop reason: SDUPTHER

## 2023-03-02 NOTE — TELEPHONE ENCOUNTER
Specialty Pharmacy - Refill Coordination    Specialty Medication Orders Linked to Encounter      Flowsheet Row Most Recent Value   Medication #1 etanercept (ENBREL SURECLICK) 50 mg/mL (1 mL) (Order#[id number], Rx#[id number])            Refill Questions - Documented Responses      Flowsheet Row Most Recent Value   Patient Availability and HIPAA Verification    Does patient want to proceed with activity? Yes   HIPAA/medical authority confirmed? Yes   Relationship to patient of person spoken to? Self   Refill Screening Questions    Changes to allergies? No   Changes to medications? No   New conditions since last clinic visit? No   Unplanned office visit, urgent care, ED, or hospital admission in the last 4 weeks? No   How does patient/caregiver feel medication is working? Good   Financial problems or insurance changes? No   How many doses of your specialty medications were missed in the last 4 weeks? 0   Would patient like to speak to a pharmacist? No   When does the patient need to receive the medication? 03/07/23   Refill Delivery Questions    How will the patient receive the medication? MEDRx   When does the patient need to receive the medication? 03/07/23   Shipping Address Home   Address in [facility] confirmed and updated if neccessary? Yes   Expected Copay ($) 0   Is the patient able to afford the medication copay? Yes   Payment Method zero copay   Days supply of Refill 28   Supplies needed? No supplies needed   Refill activity completed? Yes   Refill activity plan Refill scheduled   Shipment/Pickup Date: 03/03/23            Current Outpatient Medications   Medication Sig    amLODIPine (NORVASC) 5 MG tablet Take 1 tablet (5 mg total) by mouth once daily.    etanercept (ENBREL SURECLICK) 50 mg/mL (1 mL) Inject 1 mL (50 mg total) into the skin every 7 days.    HYDROcodone-acetaminophen (NORCO) 5-325 mg per tablet Take 1 tablet by mouth every 6 (six) hours as needed for Pain.    hydrOXYzine (ATARAX) 50 MG  tablet TAKE 1 TABLET(50 MG) BY MOUTH TWICE DAILY AS NEEDED FOR ITCHING    levothyroxine (SYNTHROID) 88 MCG tablet Take 1 tablet (88 mcg total) by mouth before breakfast.    liothyronine (CYTOMEL) 5 MCG Tab TAKE 1 TABLET(5 MCG) BY MOUTH EVERY DAY    methocarbamoL (ROBAXIN) 750 MG Tab Take 1 tablet (750 mg total) by mouth 2 (two) times daily as needed (pain).    nortriptyline (PAMELOR) 75 MG Cap TAKE 2 CAPSULES(150 MG) BY MOUTH EVERY EVENING    oxyCODONE (ROXICODONE) 5 MG immediate release tablet Take 1 tablet (5 mg total) by mouth every 6 (six) hours as needed for Pain.    promethazine (PHENERGAN) 25 MG tablet TAKE 1 TABLET(25 MG) BY MOUTH EVERY 6 HOURS AS NEEDED FOR NAUSEA OR VOMITING    propranoloL (INDERAL) 20 MG tablet Take 1 tablet (20 mg total) by mouth 2 (two) times a day.   Last reviewed on 2/13/2023  4:32 PM by Archana Lopez LPN    Review of patient's allergies indicates:   Allergen Reactions    Influenza vaccine tri-sp 09-10 Swelling    Last reviewed on  2/13/2023 4:32 PM by Archana Lopez      Tasks added this encounter   3/28/2023 - Refill Call (Auto Added)   Tasks due within next 3 months   3/17/2023 - Clinical - Follow Up Assesement (Annual)     [facility]  [address] [place] [id number]  Phone: [phone]  Fax: [phone]

## 2023-03-08 ENCOUNTER — PATIENT MESSAGE (OUTPATIENT)
Dept: RHEUMATOLOGY | Facility: CLINIC | Age: 58
End: 2023-03-08
Payer: MEDICAID

## 2023-03-09 ENCOUNTER — PATIENT MESSAGE (OUTPATIENT)
Dept: RHEUMATOLOGY | Facility: CLINIC | Age: 58
End: 2023-03-09
Payer: MEDICAID

## 2023-03-28 ENCOUNTER — SPECIALTY PHARMACY (OUTPATIENT)
Dept: PHARMACY | Facility: CLINIC | Age: 58
End: 2023-03-28
Payer: MEDICAID

## 2023-03-28 ENCOUNTER — TELEPHONE (OUTPATIENT)
Dept: RHEUMATOLOGY | Facility: CLINIC | Age: 58
End: 2023-03-28
Payer: MEDICAID

## 2023-03-28 DIAGNOSIS — Z79.899 HIGH RISK MEDICATION USE: Primary | Chronic | ICD-10-CM

## 2023-03-28 DIAGNOSIS — M06.00 SERONEGATIVE RHEUMATOID ARTHRITIS: Primary | ICD-10-CM

## 2023-03-28 NOTE — TELEPHONE ENCOUNTER
Specialty Pharmacy - Clinical Reassessment    Specialty Medication Orders Linked to Encounter      Flowsheet Row Most Recent Value   Medication #1 etanercept (ENBREL SURECLICK) 50 mg/mL (1 mL) (Order#[id number], Rx#[id number])          Patient Diagnosis   M06.00 - Seronegative rheumatoid arthritis    Angela Carlson is a 57 y.o. female, who is followed by the specialty pharmacy service for management and education of her RA.  She has been on therapy with Enbrel since 3/17/17.  I have reviewed her electronic medical record and current medication list and determined that specialty medication adjustment Is not needed at this time.    Patient has not experienced adverse events.  She Is adherent reporting 0 missed doses since last review.  Adherence has been encouraged with the following mechanism(s): Proactive refill calls.  She is meeting goals of therapy and will continue treatment.        3/2/2023 1/31/2023 1/3/2023 12/6/2022 11/8/2022 10/14/2022 9/19/2022   Follow Up Review   # of missed doses 0 0 0 0 0 0 0   New Medications? No No No No No No No   New Conditions? No No No No No No No   New Allergies? No No No No No No No   Med Effective? Good Good Very good Good Good Good Very good   Urgent Care? No No No No No No No   Requested Pharmacist? No No No No No No No            Therapy is appropriate to continue.    Therapy is effective: Yes  On scale of 1 to 10, how does patient rank quality of life? (10 - Best): Unable to Assess  Recommendations:  Pt needs to make f/u appt with Dr Dhillon. Last appt was 3/30/2022. Last TB test was 10/15/19.   Review Method: Chart Review    Tasks added this encounter   12/28/2023 - Clinical - Follow Up Assesement (Annual)   Tasks due within next 3 months   3/28/2023 - Refill Call (Auto Added)     Shara Lovell, PharmD  [facility]  [address] [place] [id number]  Phone: [phone]  Fax: [phone]

## 2023-03-28 NOTE — TELEPHONE ENCOUNTER
----- Message from Shara Lovell PharmD sent at 3/28/2023 10:13 AM CDT -----  Regarding: Appt  Good morning,     I was reviewing the pt's chart and her last OV was 3/30/22. She had to cancel her appt in October of 2022 due to covid and has not rescheduled. She also has not had TB or Hep B labs since 2019 if you wanted to add on her next lab visit.     Thanks,     Shara Lovell, PharmD   [facility] [place] [address]  [phone] (phone)  [phone] (fax)

## 2023-04-03 ENCOUNTER — SPECIALTY PHARMACY (OUTPATIENT)
Dept: PHARMACY | Facility: CLINIC | Age: 58
End: 2023-04-03
Payer: MEDICAID

## 2023-04-03 ENCOUNTER — PATIENT MESSAGE (OUTPATIENT)
Dept: PHARMACY | Facility: CLINIC | Age: 58
End: 2023-04-03
Payer: MEDICAID

## 2023-04-03 NOTE — TELEPHONE ENCOUNTER
Outgoing call to pt regarding refill/f/u reassessment for Enbrel. Phone would just ring and then hang up. Will send [employer] msg.

## 2023-04-03 NOTE — TELEPHONE ENCOUNTER
Incoming call from pt regarding Enbrel refill. Follow up due, rph calling pt back for reassessment and refill.

## 2023-04-06 ENCOUNTER — OFFICE VISIT (OUTPATIENT)
Dept: NEUROLOGY | Facility: CLINIC | Age: 58
End: 2023-04-06
Payer: MEDICAID

## 2023-04-06 VITALS
HEART RATE: 73 BPM | DIASTOLIC BLOOD PRESSURE: 80 MMHG | BODY MASS INDEX: 26.87 KG/M2 | SYSTOLIC BLOOD PRESSURE: 126 MMHG | HEIGHT: 69 IN | OXYGEN SATURATION: 98 % | WEIGHT: 181.44 LBS | RESPIRATION RATE: 14 BRPM

## 2023-04-06 DIAGNOSIS — L29.9 ITCHING: ICD-10-CM

## 2023-04-06 DIAGNOSIS — E03.4 HYPOTHYROIDISM DUE TO ACQUIRED ATROPHY OF THYROID: Chronic | ICD-10-CM

## 2023-04-06 DIAGNOSIS — T63.334S: ICD-10-CM

## 2023-04-06 DIAGNOSIS — G47.00 INSOMNIA, UNSPECIFIED TYPE: Chronic | ICD-10-CM

## 2023-04-06 DIAGNOSIS — M79.7 FIBROMYALGIA: Chronic | ICD-10-CM

## 2023-04-06 DIAGNOSIS — G47.33 OSA (OBSTRUCTIVE SLEEP APNEA): ICD-10-CM

## 2023-04-06 DIAGNOSIS — E78.5 HYPERLIPIDEMIA, UNSPECIFIED HYPERLIPIDEMIA TYPE: Chronic | ICD-10-CM

## 2023-04-06 DIAGNOSIS — R51.9 CHRONIC NONINTRACTABLE HEADACHE, UNSPECIFIED HEADACHE TYPE: Primary | ICD-10-CM

## 2023-04-06 DIAGNOSIS — D51.8 OTHER VITAMIN B12 DEFICIENCY ANEMIA: ICD-10-CM

## 2023-04-06 DIAGNOSIS — I10 PRIMARY HYPERTENSION: Chronic | ICD-10-CM

## 2023-04-06 DIAGNOSIS — G89.29 CHRONIC NONINTRACTABLE HEADACHE, UNSPECIFIED HEADACHE TYPE: Primary | ICD-10-CM

## 2023-04-06 PROBLEM — T63.331A BROWN RECLUSE SPIDER BITE: Status: ACTIVE | Noted: 2023-04-06

## 2023-04-06 PROCEDURE — 3074F PR MOST RECENT SYSTOLIC BLOOD PRESSURE < 130 MM HG: ICD-10-PCS | Mod: CPTII,,, | Performed by: NURSE PRACTITIONER

## 2023-04-06 PROCEDURE — 99999 PR PBB SHADOW E&M-EST. PATIENT-LVL III: CPT | Mod: PBBFAC,,, | Performed by: NURSE PRACTITIONER

## 2023-04-06 PROCEDURE — 99999 PR PBB SHADOW E&M-EST. PATIENT-LVL III: ICD-10-PCS | Mod: PBBFAC,,, | Performed by: NURSE PRACTITIONER

## 2023-04-06 PROCEDURE — 3008F PR BODY MASS INDEX (BMI) DOCUMENTED: ICD-10-PCS | Mod: CPTII,,, | Performed by: NURSE PRACTITIONER

## 2023-04-06 PROCEDURE — 1159F PR MEDICATION LIST DOCUMENTED IN MEDICAL RECORD: ICD-10-PCS | Mod: CPTII,,, | Performed by: NURSE PRACTITIONER

## 2023-04-06 PROCEDURE — 99213 OFFICE O/P EST LOW 20 MIN: CPT | Mod: PBBFAC | Performed by: NURSE PRACTITIONER

## 2023-04-06 PROCEDURE — 3074F SYST BP LT 130 MM HG: CPT | Mod: CPTII,,, | Performed by: NURSE PRACTITIONER

## 2023-04-06 PROCEDURE — 3079F DIAST BP 80-89 MM HG: CPT | Mod: CPTII,,, | Performed by: NURSE PRACTITIONER

## 2023-04-06 PROCEDURE — 3079F PR MOST RECENT DIASTOLIC BLOOD PRESSURE 80-89 MM HG: ICD-10-PCS | Mod: CPTII,,, | Performed by: NURSE PRACTITIONER

## 2023-04-06 PROCEDURE — 99214 OFFICE O/P EST MOD 30 MIN: CPT | Mod: S$PBB,,, | Performed by: NURSE PRACTITIONER

## 2023-04-06 PROCEDURE — 3008F BODY MASS INDEX DOCD: CPT | Mod: CPTII,,, | Performed by: NURSE PRACTITIONER

## 2023-04-06 PROCEDURE — 99214 PR OFFICE/OUTPT VISIT, EST, LEVL IV, 30-39 MIN: ICD-10-PCS | Mod: S$PBB,,, | Performed by: NURSE PRACTITIONER

## 2023-04-06 PROCEDURE — 1159F MED LIST DOCD IN RCRD: CPT | Mod: CPTII,,, | Performed by: NURSE PRACTITIONER

## 2023-04-06 RX ORDER — NORTRIPTYLINE HYDROCHLORIDE 75 MG/1
CAPSULE ORAL
Qty: 180 CAPSULE | Refills: 3 | Status: SHIPPED | OUTPATIENT
Start: 2023-04-06 | End: 2023-11-02

## 2023-04-06 RX ORDER — HYDROXYZINE HYDROCHLORIDE 50 MG/1
50 TABLET, FILM COATED ORAL 2 TIMES DAILY PRN
Qty: 60 TABLET | Refills: 2 | Status: SHIPPED | OUTPATIENT
Start: 2023-04-06 | End: 2023-10-03

## 2023-04-06 RX ORDER — PRAVASTATIN SODIUM 40 MG/1
40 TABLET ORAL DAILY
COMMUNITY
Start: 2023-01-14 | End: 2023-05-16

## 2023-04-06 NOTE — PROGRESS NOTES
HPI: Angela Carlson is a 57 y.o. female with migraine with aura and with  cervicogenic trigger possibly, although MRI C spine with only minor changes. Some complicated migraine with some alteration of consciousness at times-resolved. Anxiety and depression. She has TREY. Symptoms of lumbar radicular pain down the left leg with MRI showing disc bulge and nerve root displacement at S1 on the left 2015. She has HTN and hypothyroidism, as well as fibromyalgia. She sees Rheumatology for seronegative RA. She has HTN and HLD.     She is here today for her annual follow up. She is s/p surgery/wound debridement for a brown recluse spider bite to the left hand. She still has some weakness to the left hand, secondary to a tendon issues per patient.     CPAP titration was completed in 11/2021. She received a CPAP, but not the nose piece that she requested. She was unable to tolerate the mask. She is using her husbands extra nosepiece.     Daytime fatigue is much improved since she has been using her CPAP.     Her headaches are well controlled, and only rare. She takes Pamelor.     She has insomnia, despite taking high dose Pamelor.     She is also on Propranolol for her HTN from Endocrinology, whom she sees for her thyroid issues. BP normal today.     Her back pain is well controlled. Fibro and RA pain currently well controlled on current treatment. She sees Rheumatology at [facility].     She is rarely using Atarax as needed and same with Phenergan (not daily).     She lives in [place].     Review of Systems   Constitutional:  Negative for malaise/fatigue.   Eyes:  Negative for blurred vision and double vision.   Cardiovascular:  Negative for palpitations and leg swelling.   Musculoskeletal:  Positive for myalgias. Negative for falls, joint pain and neck pain.   Skin:  Negative for rash.   Neurological:  Negative for dizziness, tingling, sensory change, weakness and headaches.   Endo/Heme/Allergies:  Does not bruise/bleed easily.    Psychiatric/Behavioral:  Negative for depression and memory loss. The patient has insomnia. The patient is not nervous/anxious.    Exam:   Gen Appearance, well developed/nourished in no apparent distress  CV: 2+ distal pulses with no edema or swelling  Neuro:  MS: Awake, alert, Sustains attention. Recent/remote memory intact, Language is full to spontaneous speech and comprehension.  Fund of Knowledge is full  CN: PERRL, Extraoccular movements and visual fields are full. Normal facial sensation and strength, Hearing symmetric, Tongue and Palate are midline and strong. Shoulder Shrug symmetric and strong.  Motor: Normal bulk, tone, no abnormal movements. 5/5 strength bilateral upper/lower extremities with 2+ reflexes  Sensory: Romberg negative and intact to temp, vibration  Cerebellar: Finger-nose, Rapid alternating movements intact  Gait: Normal stance, no ataxia  Skin: left hand wound    Labs:   CBC normal in 2/2017  2018 B12 just above the normal range  2019 CBC reviewed    Assessment/Recommendation: Angela Carlson is a 57 y.o. female with migraine with aura and with  cervicogenic trigger possibly, although MRI C spine with only minor changes. Some complicated migraine with some alteration of consciousness at times-resolved. Anxiety and depression. She has  TREY.   Symptoms of lumbar radicular pain down the left leg with MRI showing disc bulge and nerve root displacement at S1 on the left 2015.     I recommend:   1. Saw spine surgery prior-conservative measures recommended. Back pain is currently episodic and responds to rest.     2. Regarding Fibromyalgia and headache prevention: She is using Pamelor  (higher dose per rheumatology prior) for prevention of pain.   -Off of Gabapentin now, due to nausea, which resolved after cessation.   -She tried Lyrica and Zonegran prior and she is off of Cymbalta now.     3. Can continue Atarax PRN (don't use atarax with Phenergan) for anxiety. Phenergan for nausea associated  with headache- rare use only recommended. Watch for sedation with meds.  - Rheumatology also managing hydrocodone for pain with RA.     4. She completed another CPAP titration study in 11/2021, which showed sleep disordered breathing.   Continue CPAP for TREY. Will coordinate with sleep medicine to obtain a nose piece for her CPAP.     5. Prior spells resolved/ could have been complicated migraine. Headaches are greatly improved.     6. She has switched from B12 injections to oral B12 supplementation.     7. She takes high dose Pamelor for her insomnia without relief, as well as other sedating agents. She failed Melatonin. She slept better with her CPAP prior, but she may discuss ongoing insomnia with PCP.     RTC 1 year

## 2023-04-24 ENCOUNTER — OFFICE VISIT (OUTPATIENT)
Dept: RHEUMATOLOGY | Facility: CLINIC | Age: 58
End: 2023-04-24
Payer: MEDICAID

## 2023-04-24 VITALS
SYSTOLIC BLOOD PRESSURE: 130 MMHG | HEART RATE: 84 BPM | WEIGHT: 182.31 LBS | DIASTOLIC BLOOD PRESSURE: 81 MMHG | HEIGHT: 69 IN | BODY MASS INDEX: 27 KG/M2

## 2023-04-24 DIAGNOSIS — M06.00 SERONEGATIVE RHEUMATOID ARTHRITIS: Primary | ICD-10-CM

## 2023-04-24 DIAGNOSIS — Z79.899 HIGH RISK MEDICATION USE: Chronic | ICD-10-CM

## 2023-04-24 DIAGNOSIS — M79.7 FIBROMYALGIA: Chronic | ICD-10-CM

## 2023-04-24 DIAGNOSIS — M47.817 SPONDYLOSIS OF LUMBOSACRAL REGION WITHOUT MYELOPATHY OR RADICULOPATHY: ICD-10-CM

## 2023-04-24 PROCEDURE — 99214 PR OFFICE/OUTPT VISIT, EST, LEVL IV, 30-39 MIN: ICD-10-PCS | Mod: S$PBB,,, | Performed by: INTERNAL MEDICINE

## 2023-04-24 PROCEDURE — 99214 OFFICE O/P EST MOD 30 MIN: CPT | Mod: S$PBB,,, | Performed by: INTERNAL MEDICINE

## 2023-04-24 PROCEDURE — 99999 PR PBB SHADOW E&M-EST. PATIENT-LVL IV: ICD-10-PCS | Mod: PBBFAC,,, | Performed by: INTERNAL MEDICINE

## 2023-04-24 PROCEDURE — 3008F PR BODY MASS INDEX (BMI) DOCUMENTED: ICD-10-PCS | Mod: CPTII,,, | Performed by: INTERNAL MEDICINE

## 2023-04-24 PROCEDURE — 3079F PR MOST RECENT DIASTOLIC BLOOD PRESSURE 80-89 MM HG: ICD-10-PCS | Mod: CPTII,,, | Performed by: INTERNAL MEDICINE

## 2023-04-24 PROCEDURE — 1159F PR MEDICATION LIST DOCUMENTED IN MEDICAL RECORD: ICD-10-PCS | Mod: CPTII,,, | Performed by: INTERNAL MEDICINE

## 2023-04-24 PROCEDURE — 99999 PR PBB SHADOW E&M-EST. PATIENT-LVL IV: CPT | Mod: PBBFAC,,, | Performed by: INTERNAL MEDICINE

## 2023-04-24 PROCEDURE — 3008F BODY MASS INDEX DOCD: CPT | Mod: CPTII,,, | Performed by: INTERNAL MEDICINE

## 2023-04-24 PROCEDURE — 3079F DIAST BP 80-89 MM HG: CPT | Mod: CPTII,,, | Performed by: INTERNAL MEDICINE

## 2023-04-24 PROCEDURE — 1159F MED LIST DOCD IN RCRD: CPT | Mod: CPTII,,, | Performed by: INTERNAL MEDICINE

## 2023-04-24 PROCEDURE — 3075F PR MOST RECENT SYSTOLIC BLOOD PRESS GE 130-139MM HG: ICD-10-PCS | Mod: CPTII,,, | Performed by: INTERNAL MEDICINE

## 2023-04-24 PROCEDURE — 1160F RVW MEDS BY RX/DR IN RCRD: CPT | Mod: CPTII,,, | Performed by: INTERNAL MEDICINE

## 2023-04-24 PROCEDURE — 99214 OFFICE O/P EST MOD 30 MIN: CPT | Mod: PBBFAC | Performed by: INTERNAL MEDICINE

## 2023-04-24 PROCEDURE — 3075F SYST BP GE 130 - 139MM HG: CPT | Mod: CPTII,,, | Performed by: INTERNAL MEDICINE

## 2023-04-24 PROCEDURE — 1160F PR REVIEW ALL MEDS BY PRESCRIBER/CLIN PHARMACIST DOCUMENTED: ICD-10-PCS | Mod: CPTII,,, | Performed by: INTERNAL MEDICINE

## 2023-04-24 RX ORDER — ETANERCEPT 50 MG/ML
50 SOLUTION SUBCUTANEOUS
Qty: 4 ML | Refills: 2 | Status: ACTIVE | OUTPATIENT
Start: 2023-04-24 | End: 2023-07-06 | Stop reason: SDUPTHER

## 2023-04-24 RX ORDER — METHOCARBAMOL 750 MG/1
750 TABLET, FILM COATED ORAL 2 TIMES DAILY PRN
Qty: 180 TABLET | Refills: 0 | Status: SHIPPED | OUTPATIENT
Start: 2023-04-24 | End: 2023-07-21 | Stop reason: SDUPTHER

## 2023-04-24 RX ORDER — HYDROCODONE BITARTRATE AND ACETAMINOPHEN 5; 325 MG/1; MG/1
1 TABLET ORAL EVERY 6 HOURS PRN
Qty: 80 TABLET | Refills: 0 | Status: SHIPPED | OUTPATIENT
Start: 2023-04-24 | End: 2023-05-22 | Stop reason: SDUPTHER

## 2023-04-24 NOTE — ASSESSMENT & PLAN NOTE
RA is still controlled with Enbrel    Had spider bite that required debridement L hand     Needs inflammatory markers but ok to continue Enbrel

## 2023-04-24 NOTE — PROGRESS NOTES
Rapid3 Question Responses and Scores 4/17/2023   MDHAQ Score 1   Psychologic Score 2.2   Pain Score 6.5   When you awakened in the morning OVER THE LAST WEEK, did you feel stiff? Yes   If Yes, please indicate the number of hours until you are as limber as you will be for the day 1   Fatigue Score 4   Global Health Score 4   RAPID3 Score 4.61     Answers submitted by the patient for this visit:  Rheumatology Questionnaire (Submitted on 4/17/2023)  fever: No  eye redness: No  mouth sores: No  headaches: No  shortness of breath: No  chest pain: No  trouble swallowing: No  diarrhea: No  constipation: No  unexpected weight change: No  genital sore: No  dysuria: No  During the last 3 days, have you had a skin rash?: No  Bruises or bleeds easily: No  cough: No

## 2023-04-24 NOTE — PROGRESS NOTES
"Subjective:       Patient ID: Angela Carlson is a 57 y.o. female.    Chief Complaint: Disease Management    HPI  F/u seroneg RA, FMS  RA since ~2004; Dr Cabrera 2004-5, then Dr Camacho until 2012, me since 2013  hx MTX and HCQ 2004-5   Enbrel ~2010, stopped when developed neutropenia; sx remitted 2013-15, then recurrent synovitis  HCQ [id number]  Enbrel restarted March 2017     Weekly Enbrel  Meloxicam daily      Some morning stiffness of L wrist  No psoriasis  Had flu Nov 2021     Still takes robaxin and nortriptyline for chronic back pain  Hydrocodone #80 Sept 11, Aug 6, July 6    FMS and migraine HA: Dx before 2013; robaxin and doxepin; nortriptyline added 2014; hydrocodone since 2014 after failed tramadol    Bit by a spider brown recluse L hand and required 2 surgeries in 72 hours in hospital Dec 27 and 29     More morning stiffness   No increase in swelling    Review of Systems   Constitutional:  Negative for fever and unexpected weight change.   HENT:  Negative for mouth sores and trouble swallowing.    Eyes:  Negative for redness.   Respiratory:  Negative for cough and shortness of breath.    Cardiovascular:  Negative for chest pain.   Gastrointestinal:  Negative for constipation and diarrhea.   Genitourinary:  Negative for dysuria and genital sores.   Skin:  Negative for rash.   Neurological:  Negative for headaches.   Hematological:  Does not bruise/bleed easily.       Objective:   /81   Pulse 84   Ht 5' 9" (1.753 m)   Wt 82.7 kg (182 lb 5.1 oz)   BMI 26.92 kg/m²      Physical Exam      11/13/2019 3/24/2021 3/30/2022 4/24/2023   Tender (YAN-28) 0 / 28  0 / 28  0 / 28  0 / 28    Swollen (YAN-28) 0 / 28 5 / 28  0 / 28 1 / 28    Provider Global 10 mm 20 mm 0 mm 10 mm   Patient Global 35 mm 55 mm 5 mm 40 mm   ESR 7 mm/hr 5 mm/hr 20 mm/hr --   CRP 2.7 mg/L 3.8 mg/L 1.0 mg/L --   YAN-28 (ESR) 1.85 (Remission) 2.52 (Remission) 2.17 (Remission) --   YAN-28 (CRP) 1.92 (Remission) 2.92 (Low disease activity) " 1.28 (Remission) --   CDAI Score 0  12.5  0.5  6      Lab Results   Component Value Date    SEDRATE 57 (H) 12/25/2022          Assessment:       1. Seronegative rheumatoid arthritis    2. High risk medication use    3. Fibromyalgia    4. Spondylosis of lumbosacral region without myelopathy or radiculopathy            Plan:       Problem List Items Addressed This Visit          Active Problems    High risk medication use (Chronic)     Recent normal cbc and BMP and CMP Feb and Dec  Will schedule additional lab for Enbrel monitoring           Fibromyalgia (Chronic)     No change in symptoms or meds; will refill her narcotics           Relevant Medications    methocarbamoL (ROBAXIN) 750 MG Tab    Spondylosis of lumbosacral region without myelopathy or radiculopathy    Relevant Medications    HYDROcodone-acetaminophen (NORCO) 5-325 mg per tablet    Seronegative rheumatoid arthritis - Primary     RA is still controlled with Enbrel    Had spider bite that required debridement L hand     Needs inflammatory markers but ok to continue Enbrel           Relevant Medications    etanercept (ENBREL SURECLICK) 50 mg/mL (1 mL)

## 2023-04-26 ENCOUNTER — SPECIALTY PHARMACY (OUTPATIENT)
Dept: PHARMACY | Facility: CLINIC | Age: 58
End: 2023-04-26
Payer: MEDICAID

## 2023-04-26 NOTE — TELEPHONE ENCOUNTER
Specialty Pharmacy - Refill Coordination    Specialty Medication Orders Linked to Encounter      Flowsheet Row Most Recent Value   Medication #1 etanercept (ENBREL SURECLICK) 50 mg/mL (1 mL) (Order#[id number], Rx#[id number])            Refill Questions - Documented Responses      Flowsheet Row Most Recent Value   Patient Availability and HIPAA Verification    Does patient want to proceed with activity? Yes   HIPAA/medical authority confirmed? Yes   Relationship to patient of person spoken to? Self   Refill Screening Questions    Changes to allergies? No   Changes to medications? No   New conditions since last clinic visit? No   Unplanned office visit, urgent care, ED, or hospital admission in the last 4 weeks? No   How does patient/caregiver feel medication is working? Good   Financial problems or insurance changes? No   How many doses of your specialty medications were missed in the last 4 weeks? 0   Would patient like to speak to a pharmacist? No   When does the patient need to receive the medication? 05/01/23   Refill Delivery Questions    How will the patient receive the medication? MEDRx   When does the patient need to receive the medication? 05/01/23   Shipping Address Home   Address in [facility] confirmed and updated if neccessary? Yes   Expected Copay ($) 0   Is the patient able to afford the medication copay? Yes   Payment Method zero copay   Days supply of Refill 28   Supplies needed? No supplies needed   Refill activity completed? Yes   Refill activity plan Refill scheduled   Shipment/Pickup Date: 04/27/23            Current Outpatient Medications   Medication Sig    amLODIPine (NORVASC) 5 MG tablet Take 1 tablet (5 mg total) by mouth once daily.    etanercept (ENBREL SURECLICK) 50 mg/mL (1 mL) Inject 1 mL (50 mg total) into the skin every 7 days.    HYDROcodone-acetaminophen (NORCO) 5-325 mg per tablet Take 1 tablet by mouth every 6 (six) hours as needed for Pain.    hydrOXYzine (ATARAX) 50 MG  tablet Take 1 tablet (50 mg total) by mouth 2 (two) times daily as needed for Itching.    levothyroxine (SYNTHROID) 88 MCG tablet Take 1 tablet (88 mcg total) by mouth before breakfast.    liothyronine (CYTOMEL) 5 MCG Tab TAKE 1 TABLET(5 MCG) BY MOUTH EVERY DAY    methocarbamoL (ROBAXIN) 750 MG Tab Take 1 tablet (750 mg total) by mouth 2 (two) times daily as needed (pain).    nortriptyline (PAMELOR) 75 MG Cap TAKE 2 CAPSULES(150 MG) BY MOUTH EVERY EVENING    pravastatin (PRAVACHOL) 40 MG tablet Take 40 mg by mouth once daily.    promethazine (PHENERGAN) 25 MG tablet TAKE 1 TABLET(25 MG) BY MOUTH EVERY 6 HOURS AS NEEDED FOR NAUSEA OR VOMITING    propranoloL (INDERAL) 20 MG tablet Take 1 tablet (20 mg total) by mouth 2 (two) times a day.   Last reviewed on 4/24/2023  2:04 PM by Andi Dhillon MD    Review of patient's allergies indicates:   Allergen Reactions    Influenza vaccine tri-sp 09-10 Swelling    Last reviewed on  4/24/2023 2:04 PM by Andi Dhillon      Tasks added this encounter   No tasks added.   Tasks due within next 3 months   4/26/2023 - Refill Coordination Outreach (1 time occurrence)     [facility]  [facility] [address]  Phone: [phone]  Fax: [phone]

## 2023-05-22 DIAGNOSIS — M47.817 SPONDYLOSIS OF LUMBOSACRAL REGION WITHOUT MYELOPATHY OR RADICULOPATHY: ICD-10-CM

## 2023-05-23 ENCOUNTER — SPECIALTY PHARMACY (OUTPATIENT)
Dept: PHARMACY | Facility: CLINIC | Age: 58
End: 2023-05-23
Payer: MEDICAID

## 2023-05-23 RX ORDER — HYDROCODONE BITARTRATE AND ACETAMINOPHEN 5; 325 MG/1; MG/1
1 TABLET ORAL EVERY 6 HOURS PRN
Qty: 80 TABLET | Refills: 0 | Status: SHIPPED | OUTPATIENT
Start: 2023-05-23 | End: 2023-06-21 | Stop reason: SDUPTHER

## 2023-05-23 NOTE — TELEPHONE ENCOUNTER
Specialty Pharmacy - Refill Coordination    Specialty Medication Orders Linked to Encounter      Flowsheet Row Most Recent Value   Medication #1 etanercept (ENBREL SURECLICK) 50 mg/mL (1 mL) (Order#[id number], Rx#[id number])            Refill Questions - Documented Responses      Flowsheet Row Most Recent Value   Patient Availability and HIPAA Verification    Does patient want to proceed with activity? Yes   HIPAA/medical authority confirmed? Yes   Relationship to patient of person spoken to? Self   Refill Screening Questions    Changes to allergies? No   Changes to medications? No   New conditions since last clinic visit? No   Unplanned office visit, urgent care, ED, or hospital admission in the last 4 weeks? No   How does patient/caregiver feel medication is working? Good   Financial problems or insurance changes? No   How many doses of your specialty medications were missed in the last 4 weeks? 0   Would patient like to speak to a pharmacist? No   When does the patient need to receive the medication? 05/29/23   Refill Delivery Questions    How will the patient receive the medication? MEDRx   When does the patient need to receive the medication? 05/29/23   Shipping Address Home   Address in [facility] confirmed and updated if neccessary? Yes   Expected Copay ($) 0   Is the patient able to afford the medication copay? Yes   Payment Method zero copay   Days supply of Refill 28   Supplies needed? No supplies needed   Refill activity completed? Yes   Refill activity plan Refill scheduled   Shipment/Pickup Date: 05/25/23            Current Outpatient Medications   Medication Sig    amLODIPine (NORVASC) 5 MG tablet TAKE 1 TABLET(5 MG) BY MOUTH EVERY DAY    atorvastatin (LIPITOR) 40 MG tablet Take 1 tablet (40 mg total) by mouth once daily.    etanercept (ENBREL SURECLICK) 50 mg/mL (1 mL) Inject 1 mL (50 mg total) into the skin every 7 days.    HYDROcodone-acetaminophen (NORCO) 5-325 mg per tablet Take 1 tablet by  mouth every 6 (six) hours as needed for Pain.    hydrOXYzine (ATARAX) 50 MG tablet Take 1 tablet (50 mg total) by mouth 2 (two) times daily as needed for Itching.    levothyroxine (SYNTHROID) 100 MCG tablet Take 1 tablet (100 mcg total) by mouth before breakfast.    liothyronine (CYTOMEL) 5 MCG Tab TAKE 1 TABLET(5 MCG) BY MOUTH EVERY DAY    methocarbamoL (ROBAXIN) 750 MG Tab Take 1 tablet (750 mg total) by mouth 2 (two) times daily as needed (pain).    nortriptyline (PAMELOR) 75 MG Cap TAKE 2 CAPSULES(150 MG) BY MOUTH EVERY EVENING    promethazine (PHENERGAN) 25 MG tablet TAKE 1 TABLET(25 MG) BY MOUTH EVERY 6 HOURS AS NEEDED FOR NAUSEA OR VOMITING    propranoloL (INDERAL) 20 MG tablet Take 1 tablet (20 mg total) by mouth 2 (two) times a day.   Last reviewed on 5/16/2023  9:34 AM by Candy Chavez MA    Review of patient's allergies indicates:   Allergen Reactions    Influenza vaccine tri-sp 09-10 Swelling    Last reviewed on  5/16/2023 10:08 AM by Shaneka Ceja      Tasks added this encounter   No tasks added.   Tasks due within next 3 months   5/21/2023 - Refill Coordination Outreach (1 time occurrence)     [facility]  [address] [place] [id number]  Phone: [phone]  Fax: [phone]

## 2023-06-15 ENCOUNTER — SPECIALTY PHARMACY (OUTPATIENT)
Dept: PHARMACY | Facility: CLINIC | Age: 58
End: 2023-06-15
Payer: MEDICAID

## 2023-06-15 NOTE — TELEPHONE ENCOUNTER
Specialty Pharmacy - Refill Coordination    Specialty Medication Orders Linked to Encounter      Flowsheet Row Most Recent Value   Medication #1 etanercept (ENBREL SURECLICK) 50 mg/mL (1 mL) (Order#[id number], Rx#[id number])            Refill Questions - Documented Responses      Flowsheet Row Most Recent Value   Patient Availability and HIPAA Verification    Does patient want to proceed with activity? Yes   HIPAA/medical authority confirmed? Yes   Relationship to patient of person spoken to? Self   Refill Screening Questions    Changes to allergies? No   Changes to medications? No   New conditions since last clinic visit? No   Unplanned office visit, urgent care, ED, or hospital admission in the last 4 weeks? No   How does patient/caregiver feel medication is working? Good   Financial problems or insurance changes? No   How many doses of your specialty medications were missed in the last 4 weeks? 0   Would patient like to speak to a pharmacist? No   When does the patient need to receive the medication? 06/19/23   Refill Delivery Questions    How will the patient receive the medication? MEDRx   When does the patient need to receive the medication? 06/19/23   Shipping Address Home   Address in [facility] confirmed and updated if neccessary? Yes   Expected Copay ($) 0   Is the patient able to afford the medication copay? Yes   Payment Method zero copay   Days supply of Refill 28   Supplies needed? No supplies needed   Refill activity completed? Yes   Refill activity plan Refill scheduled   Shipment/Pickup Date: 06/15/23            Current Outpatient Medications   Medication Sig    amLODIPine (NORVASC) 5 MG tablet TAKE 1 TABLET(5 MG) BY MOUTH EVERY DAY    atorvastatin (LIPITOR) 40 MG tablet Take 1 tablet (40 mg total) by mouth once daily.    etanercept (ENBREL SURECLICK) 50 mg/mL (1 mL) Inject 1 mL (50 mg total) into the skin every 7 days.    HYDROcodone-acetaminophen (NORCO) 5-325 mg per tablet Take 1 tablet by  mouth every 6 (six) hours as needed for Pain.    hydrOXYzine (ATARAX) 50 MG tablet Take 1 tablet (50 mg total) by mouth 2 (two) times daily as needed for Itching.    levothyroxine (SYNTHROID) 100 MCG tablet Take 1 tablet (100 mcg total) by mouth before breakfast.    liothyronine (CYTOMEL) 5 MCG Tab TAKE 1 TABLET(5 MCG) BY MOUTH EVERY DAY    methocarbamoL (ROBAXIN) 750 MG Tab Take 1 tablet (750 mg total) by mouth 2 (two) times daily as needed (pain).    nortriptyline (PAMELOR) 75 MG Cap TAKE 2 CAPSULES(150 MG) BY MOUTH EVERY EVENING    promethazine (PHENERGAN) 25 MG tablet TAKE 1 TABLET(25 MG) BY MOUTH EVERY 6 HOURS AS NEEDED FOR NAUSEA OR VOMITING    propranoloL (INDERAL) 20 MG tablet Take 1 tablet (20 mg total) by mouth 2 (two) times a day.   Last reviewed on 5/16/2023  9:34 AM by Candy Chavez MA    Review of patient's allergies indicates:   Allergen Reactions    Influenza vaccine tri-sp 09-10 Swelling    Last reviewed on  5/16/2023 10:08 AM by Shaneka Ceja      Tasks added this encounter   No tasks added.   Tasks due within next 3 months   6/15/2023 - Refill Coordination Outreach (1 time occurrence)     [facility] - Specialty Pharmacy  [address] [place] [id number]  Phone: [phone]  Fax: [phone]

## 2023-06-21 DIAGNOSIS — M47.817 SPONDYLOSIS OF LUMBOSACRAL REGION WITHOUT MYELOPATHY OR RADICULOPATHY: ICD-10-CM

## 2023-06-23 DIAGNOSIS — M47.817 SPONDYLOSIS OF LUMBOSACRAL REGION WITHOUT MYELOPATHY OR RADICULOPATHY: ICD-10-CM

## 2023-06-23 RX ORDER — HYDROCODONE BITARTRATE AND ACETAMINOPHEN 5; 325 MG/1; MG/1
1 TABLET ORAL EVERY 6 HOURS PRN
Qty: 80 TABLET | Refills: 0 | Status: SHIPPED | OUTPATIENT
Start: 2023-06-23 | End: 2023-07-24 | Stop reason: SDUPTHER

## 2023-06-23 RX ORDER — HYDROCODONE BITARTRATE AND ACETAMINOPHEN 5; 325 MG/1; MG/1
1 TABLET ORAL EVERY 6 HOURS PRN
Qty: 80 TABLET | Refills: 0 | Status: SHIPPED | OUTPATIENT
Start: 2023-06-23 | End: 2023-08-22 | Stop reason: SDUPTHER

## 2023-07-06 ENCOUNTER — SPECIALTY PHARMACY (OUTPATIENT)
Dept: PHARMACY | Facility: CLINIC | Age: 58
End: 2023-07-06
Payer: MEDICAID

## 2023-07-06 DIAGNOSIS — M06.00 SERONEGATIVE RHEUMATOID ARTHRITIS: ICD-10-CM

## 2023-07-06 NOTE — TELEPHONE ENCOUNTER
Outgoing call regarding enbrel refill; per pt, she has a pen on hand for 7/10, but will need more for 7/17; informed her that a refill request was sent to md, and once approved [facility] will follow up to schedule delivery

## 2023-07-07 RX ORDER — ETANERCEPT 50 MG/ML
50 SOLUTION SUBCUTANEOUS
Qty: 4 ML | Refills: 2 | Status: ACTIVE | OUTPATIENT
Start: 2023-07-07 | End: 2023-09-22 | Stop reason: SDUPTHER

## 2023-07-10 NOTE — TELEPHONE ENCOUNTER
Specialty Pharmacy - Refill Coordination    Specialty Medication Orders Linked to Encounter      Flowsheet Row Most Recent Value   Medication #1 etanercept (ENBREL SURECLICK) 50 mg/mL (1 mL) (Order#[id number], Rx#[id number])            Refill Questions - Documented Responses      Flowsheet Row Most Recent Value   Patient Availability and HIPAA Verification    Does patient want to proceed with activity? Yes   HIPAA/medical authority confirmed? Yes   Relationship to patient of person spoken to? Self   Refill Screening Questions    Changes to allergies? No   Changes to medications? No   New conditions since last clinic visit? No   Unplanned office visit, urgent care, ED, or hospital admission in the last 4 weeks? No   How does patient/caregiver feel medication is working? Good   Financial problems or insurance changes? No   How many doses of your specialty medications were missed in the last 4 weeks? 0   Would patient like to speak to a pharmacist? No   When does the patient need to receive the medication? 07/17/23   Refill Delivery Questions    How will the patient receive the medication? MEDRx   When does the patient need to receive the medication? 07/17/23   Shipping Address Home   Address in [facility] confirmed and updated if neccessary? Yes   Expected Copay ($) 0   Is the patient able to afford the medication copay? Yes   Payment Method zero copay   Days supply of Refill 28   Supplies needed? No supplies needed   Refill activity completed? Yes   Refill activity plan Refill scheduled   Shipment/Pickup Date: 07/12/23            Current Outpatient Medications   Medication Sig    amLODIPine (NORVASC) 5 MG tablet TAKE 1 TABLET(5 MG) BY MOUTH EVERY DAY    atorvastatin (LIPITOR) 40 MG tablet Take 1 tablet (40 mg total) by mouth once daily.    etanercept (ENBREL SURECLICK) 50 mg/mL (1 mL) Inject 1 mL (50 mg total) into the skin every 7 days.    HYDROcodone-acetaminophen (NORCO) 5-325 mg per tablet Take 1 tablet by  mouth every 6 (six) hours as needed for Pain.    HYDROcodone-acetaminophen (NORCO) 5-325 mg per tablet Take 1 tablet by mouth every 6 (six) hours as needed for Pain.    hydrOXYzine (ATARAX) 50 MG tablet Take 1 tablet (50 mg total) by mouth 2 (two) times daily as needed for Itching.    levothyroxine (SYNTHROID) 100 MCG tablet Take 1 tablet (100 mcg total) by mouth before breakfast.    liothyronine (CYTOMEL) 5 MCG Tab TAKE 1 TABLET(5 MCG) BY MOUTH EVERY DAY    methocarbamoL (ROBAXIN) 750 MG Tab Take 1 tablet (750 mg total) by mouth 2 (two) times daily as needed (pain).    nortriptyline (PAMELOR) 75 MG Cap TAKE 2 CAPSULES(150 MG) BY MOUTH EVERY EVENING    promethazine (PHENERGAN) 25 MG tablet TAKE 1 TABLET(25 MG) BY MOUTH EVERY 6 HOURS AS NEEDED FOR NAUSEA OR VOMITING    propranoloL (INDERAL) 20 MG tablet Take 1 tablet (20 mg total) by mouth 2 (two) times a day.   Last reviewed on 5/16/2023  9:34 AM by Candy Chavez MA    Review of patient's allergies indicates:   Allergen Reactions    Influenza vaccine tri-sp 09-10 Swelling    Last reviewed on  5/16/2023 10:08 AM by Shaneka Ceja      Tasks added this encounter   No tasks added.   Tasks due within next 3 months   7/9/2023 - Refill Coordination Outreach (1 time occurrence)     [facility] - Specialty Pharmacy  [address] [place] [id number]  Phone: [phone]  Fax: [phone]

## 2023-07-21 DIAGNOSIS — M79.7 FIBROMYALGIA: Chronic | ICD-10-CM

## 2023-07-22 DIAGNOSIS — M79.7 FIBROMYALGIA: Chronic | ICD-10-CM

## 2023-07-24 ENCOUNTER — PATIENT MESSAGE (OUTPATIENT)
Dept: RHEUMATOLOGY | Facility: CLINIC | Age: 58
End: 2023-07-24
Payer: MEDICAID

## 2023-07-24 DIAGNOSIS — M47.817 SPONDYLOSIS OF LUMBOSACRAL REGION WITHOUT MYELOPATHY OR RADICULOPATHY: ICD-10-CM

## 2023-07-24 RX ORDER — METHOCARBAMOL 750 MG/1
TABLET, FILM COATED ORAL
Qty: 180 TABLET | Refills: 0 | Status: SHIPPED | OUTPATIENT
Start: 2023-07-24 | End: 2023-09-22 | Stop reason: SDUPTHER

## 2023-07-24 RX ORDER — METHOCARBAMOL 750 MG/1
750 TABLET, FILM COATED ORAL 2 TIMES DAILY PRN
Qty: 180 TABLET | Refills: 0 | Status: SHIPPED | OUTPATIENT
Start: 2023-07-24 | End: 2023-09-22

## 2023-07-25 ENCOUNTER — PATIENT MESSAGE (OUTPATIENT)
Dept: RHEUMATOLOGY | Facility: CLINIC | Age: 58
End: 2023-07-25
Payer: MEDICAID

## 2023-07-25 RX ORDER — HYDROCODONE BITARTRATE AND ACETAMINOPHEN 5; 325 MG/1; MG/1
1 TABLET ORAL EVERY 6 HOURS PRN
Qty: 80 TABLET | Refills: 0 | Status: SHIPPED | OUTPATIENT
Start: 2023-07-25 | End: 2023-09-22

## 2023-08-02 ENCOUNTER — SPECIALTY PHARMACY (OUTPATIENT)
Dept: PHARMACY | Facility: CLINIC | Age: 58
End: 2023-08-02
Payer: MEDICAID

## 2023-08-07 NOTE — TELEPHONE ENCOUNTER
Specialty Pharmacy - Refill Coordination    Specialty Medication Orders Linked to Encounter      Flowsheet Row Most Recent Value   Medication #1 etanercept (ENBREL SURECLICK) 50 mg/mL (1 mL) (Order#[id number], Rx#[id number])            Refill Questions - Documented Responses      Flowsheet Row Most Recent Value   Patient Availability and HIPAA Verification    Does patient want to proceed with activity? Yes   HIPAA/medical authority confirmed? Yes   Relationship to patient of person spoken to? Self   Refill Screening Questions    Changes to allergies? No   Changes to medications? No   New conditions since last clinic visit? No   Unplanned office visit, urgent care, ED, or hospital admission in the last 4 weeks? No   How does patient/caregiver feel medication is working? Good   Financial problems or insurance changes? No   How many doses of your specialty medications were missed in the last 4 weeks? 0   Would patient like to speak to a pharmacist? No   When does the patient need to receive the medication? 08/14/23   Refill Delivery Questions    How will the patient receive the medication? MEDRx   When does the patient need to receive the medication? 08/14/23   Shipping Address Home   Address in [facility] confirmed and updated if neccessary? Yes   Expected Copay ($) 0   Is the patient able to afford the medication copay? Yes   Payment Method zero copay   Days supply of Refill 28   Supplies needed? No supplies needed   Refill activity completed? Yes   Refill activity plan Refill scheduled   Shipment/Pickup Date: 08/10/23            Current Outpatient Medications   Medication Sig    amLODIPine (NORVASC) 5 MG tablet TAKE 1 TABLET(5 MG) BY MOUTH EVERY DAY    atorvastatin (LIPITOR) 40 MG tablet Take 1 tablet (40 mg total) by mouth once daily.    etanercept (ENBREL SURECLICK) 50 mg/mL (1 mL) Inject 1 mL (50 mg total) into the skin every 7 days.    HYDROcodone-acetaminophen (NORCO) 5-325 mg per tablet Take 1 tablet by  mouth every 6 (six) hours as needed for Pain.    HYDROcodone-acetaminophen (NORCO) 5-325 mg per tablet Take 1 tablet by mouth every 6 (six) hours as needed for Pain.    hydrOXYzine (ATARAX) 50 MG tablet Take 1 tablet (50 mg total) by mouth 2 (two) times daily as needed for Itching.    levothyroxine (SYNTHROID) 100 MCG tablet Take 1 tablet (100 mcg total) by mouth before breakfast.    liothyronine (CYTOMEL) 5 MCG Tab TAKE 1 TABLET(5 MCG) BY MOUTH EVERY DAY    methocarbamoL (ROBAXIN) 750 MG Tab Take 1 tablet (750 mg total) by mouth 2 (two) times daily as needed (pain).    methocarbamoL (ROBAXIN) 750 MG Tab TAKE 1 TABLET(750 MG) BY MOUTH TWICE DAILY AS NEEDED FOR PAIN    nortriptyline (PAMELOR) 75 MG Cap TAKE 2 CAPSULES(150 MG) BY MOUTH EVERY EVENING    promethazine (PHENERGAN) 25 MG tablet TAKE 1 TABLET(25 MG) BY MOUTH EVERY 6 HOURS AS NEEDED FOR NAUSEA OR VOMITING    propranoloL (INDERAL) 20 MG tablet Take 1 tablet (20 mg total) by mouth 2 (two) times a day.   Last reviewed on 5/16/2023  9:34 AM by Candy Chavez MA    Review of patient's allergies indicates:   Allergen Reactions    Influenza vaccine tri-sp 09-10 Swelling    Last reviewed on  5/16/2023 10:08 AM by Shaneka Ceja      Tasks added this encounter   No tasks added.   Tasks due within next 3 months   No tasks due.     [facility] - Specialty Pharmacy  [address] [place] [id number]  Phone: [phone]  Fax: [phone]

## 2023-08-22 ENCOUNTER — PATIENT MESSAGE (OUTPATIENT)
Dept: RHEUMATOLOGY | Facility: CLINIC | Age: 58
End: 2023-08-22
Payer: MEDICAID

## 2023-08-22 DIAGNOSIS — M47.817 SPONDYLOSIS OF LUMBOSACRAL REGION WITHOUT MYELOPATHY OR RADICULOPATHY: ICD-10-CM

## 2023-08-22 RX ORDER — HYDROCODONE BITARTRATE AND ACETAMINOPHEN 5; 325 MG/1; MG/1
1 TABLET ORAL EVERY 6 HOURS PRN
Qty: 80 TABLET | Refills: 0 | Status: SHIPPED | OUTPATIENT
Start: 2023-08-22 | End: 2023-09-22 | Stop reason: SDUPTHER

## 2023-09-22 ENCOUNTER — LAB VISIT (OUTPATIENT)
Dept: LAB | Facility: HOSPITAL | Age: 58
End: 2023-09-22
Attending: INTERNAL MEDICINE
Payer: MEDICAID

## 2023-09-22 ENCOUNTER — OFFICE VISIT (OUTPATIENT)
Dept: RHEUMATOLOGY | Facility: CLINIC | Age: 58
End: 2023-09-22
Payer: MEDICAID

## 2023-09-22 VITALS
HEIGHT: 69 IN | SYSTOLIC BLOOD PRESSURE: 148 MMHG | DIASTOLIC BLOOD PRESSURE: 79 MMHG | WEIGHT: 185.44 LBS | HEART RATE: 75 BPM | BODY MASS INDEX: 27.46 KG/M2

## 2023-09-22 DIAGNOSIS — M06.09 RHEUMATOID ARTHRITIS OF MULTIPLE SITES WITH NEGATIVE RHEUMATOID FACTOR: ICD-10-CM

## 2023-09-22 DIAGNOSIS — Z79.899 HIGH RISK MEDICATION USE: Chronic | ICD-10-CM

## 2023-09-22 DIAGNOSIS — M47.817 SPONDYLOSIS OF LUMBOSACRAL REGION WITHOUT MYELOPATHY OR RADICULOPATHY: ICD-10-CM

## 2023-09-22 DIAGNOSIS — M79.7 FIBROMYALGIA: Chronic | ICD-10-CM

## 2023-09-22 DIAGNOSIS — M06.00 SERONEGATIVE RHEUMATOID ARTHRITIS: ICD-10-CM

## 2023-09-22 LAB
ALBUMIN SERPL BCP-MCNC: 4.1 G/DL (ref 3.5–5.2)
ALP SERPL-CCNC: 106 U/L (ref 55–135)
ALT SERPL W/O P-5'-P-CCNC: 17 U/L (ref 10–44)
ANION GAP SERPL CALC-SCNC: 5 MMOL/L (ref 8–16)
AST SERPL-CCNC: 19 U/L (ref 10–40)
BASOPHILS # BLD AUTO: 0.03 K/UL (ref 0–0.2)
BASOPHILS NFR BLD: 0.6 % (ref 0–1.9)
BILIRUB SERPL-MCNC: 0.5 MG/DL (ref 0.1–1)
BUN SERPL-MCNC: 12 MG/DL (ref 6–20)
CALCIUM SERPL-MCNC: 10.2 MG/DL (ref 8.7–10.5)
CHLORIDE SERPL-SCNC: 109 MMOL/L (ref 95–110)
CO2 SERPL-SCNC: 24 MMOL/L (ref 23–29)
CREAT SERPL-MCNC: 0.9 MG/DL (ref 0.5–1.4)
CRP SERPL-MCNC: 0.7 MG/L (ref 0–8.2)
DIFFERENTIAL METHOD: NORMAL
EOSINOPHIL # BLD AUTO: 0 K/UL (ref 0–0.5)
EOSINOPHIL NFR BLD: 0.6 % (ref 0–8)
ERYTHROCYTE [DISTWIDTH] IN BLOOD BY AUTOMATED COUNT: 13.6 % (ref 11.5–14.5)
ERYTHROCYTE [SEDIMENTATION RATE] IN BLOOD BY PHOTOMETRIC METHOD: 17 MM/HR (ref 0–36)
EST. GFR  (NO RACE VARIABLE): >60 ML/MIN/1.73 M^2
GLUCOSE SERPL-MCNC: 97 MG/DL (ref 70–110)
HCT VFR BLD AUTO: 44.5 % (ref 37–48.5)
HGB BLD-MCNC: 14.5 G/DL (ref 12–16)
IMM GRANULOCYTES # BLD AUTO: 0.02 K/UL (ref 0–0.04)
IMM GRANULOCYTES NFR BLD AUTO: 0.4 % (ref 0–0.5)
LYMPHOCYTES # BLD AUTO: 1.6 K/UL (ref 1–4.8)
LYMPHOCYTES NFR BLD: 31.9 % (ref 18–48)
MCH RBC QN AUTO: 28.4 PG (ref 27–31)
MCHC RBC AUTO-ENTMCNC: 32.6 G/DL (ref 32–36)
MCV RBC AUTO: 87 FL (ref 82–98)
MONOCYTES # BLD AUTO: 0.4 K/UL (ref 0.3–1)
MONOCYTES NFR BLD: 7.8 % (ref 4–15)
NEUTROPHILS # BLD AUTO: 3 K/UL (ref 1.8–7.7)
NEUTROPHILS NFR BLD: 58.7 % (ref 38–73)
NRBC BLD-RTO: 0 /100 WBC
PLATELET # BLD AUTO: 205 K/UL (ref 150–450)
PMV BLD AUTO: 11 FL (ref 9.2–12.9)
POTASSIUM SERPL-SCNC: 3.9 MMOL/L (ref 3.5–5.1)
PROT SERPL-MCNC: 7.6 G/DL (ref 6–8.4)
RBC # BLD AUTO: 5.11 M/UL (ref 4–5.4)
SODIUM SERPL-SCNC: 138 MMOL/L (ref 136–145)
WBC # BLD AUTO: 5.14 K/UL (ref 3.9–12.7)

## 2023-09-22 PROCEDURE — 1159F MED LIST DOCD IN RCRD: CPT | Mod: CPTII,,, | Performed by: INTERNAL MEDICINE

## 2023-09-22 PROCEDURE — 3008F PR BODY MASS INDEX (BMI) DOCUMENTED: ICD-10-PCS | Mod: CPTII,,, | Performed by: INTERNAL MEDICINE

## 2023-09-22 PROCEDURE — 85025 COMPLETE CBC W/AUTO DIFF WBC: CPT | Performed by: INTERNAL MEDICINE

## 2023-09-22 PROCEDURE — 3077F PR MOST RECENT SYSTOLIC BLOOD PRESSURE >= 140 MM HG: ICD-10-PCS | Mod: CPTII,,, | Performed by: INTERNAL MEDICINE

## 2023-09-22 PROCEDURE — 99213 OFFICE O/P EST LOW 20 MIN: CPT | Mod: PBBFAC | Performed by: INTERNAL MEDICINE

## 2023-09-22 PROCEDURE — 99214 OFFICE O/P EST MOD 30 MIN: CPT | Mod: S$PBB,,, | Performed by: INTERNAL MEDICINE

## 2023-09-22 PROCEDURE — 3077F SYST BP >= 140 MM HG: CPT | Mod: CPTII,,, | Performed by: INTERNAL MEDICINE

## 2023-09-22 PROCEDURE — 36415 COLL VENOUS BLD VENIPUNCTURE: CPT | Performed by: INTERNAL MEDICINE

## 2023-09-22 PROCEDURE — 3078F PR MOST RECENT DIASTOLIC BLOOD PRESSURE < 80 MM HG: ICD-10-PCS | Mod: CPTII,,, | Performed by: INTERNAL MEDICINE

## 2023-09-22 PROCEDURE — 85652 RBC SED RATE AUTOMATED: CPT | Performed by: INTERNAL MEDICINE

## 2023-09-22 PROCEDURE — 80053 COMPREHEN METABOLIC PANEL: CPT | Performed by: INTERNAL MEDICINE

## 2023-09-22 PROCEDURE — 1159F PR MEDICATION LIST DOCUMENTED IN MEDICAL RECORD: ICD-10-PCS | Mod: CPTII,,, | Performed by: INTERNAL MEDICINE

## 2023-09-22 PROCEDURE — 1160F RVW MEDS BY RX/DR IN RCRD: CPT | Mod: CPTII,,, | Performed by: INTERNAL MEDICINE

## 2023-09-22 PROCEDURE — 3008F BODY MASS INDEX DOCD: CPT | Mod: CPTII,,, | Performed by: INTERNAL MEDICINE

## 2023-09-22 PROCEDURE — 86140 C-REACTIVE PROTEIN: CPT | Performed by: INTERNAL MEDICINE

## 2023-09-22 PROCEDURE — 99999 PR PBB SHADOW E&M-EST. PATIENT-LVL III: CPT | Mod: PBBFAC,,, | Performed by: INTERNAL MEDICINE

## 2023-09-22 PROCEDURE — 99214 PR OFFICE/OUTPT VISIT, EST, LEVL IV, 30-39 MIN: ICD-10-PCS | Mod: S$PBB,,, | Performed by: INTERNAL MEDICINE

## 2023-09-22 PROCEDURE — 3078F DIAST BP <80 MM HG: CPT | Mod: CPTII,,, | Performed by: INTERNAL MEDICINE

## 2023-09-22 PROCEDURE — 99999 PR PBB SHADOW E&M-EST. PATIENT-LVL III: ICD-10-PCS | Mod: PBBFAC,,, | Performed by: INTERNAL MEDICINE

## 2023-09-22 PROCEDURE — 1160F PR REVIEW ALL MEDS BY PRESCRIBER/CLIN PHARMACIST DOCUMENTED: ICD-10-PCS | Mod: CPTII,,, | Performed by: INTERNAL MEDICINE

## 2023-09-22 RX ORDER — METHOCARBAMOL 750 MG/1
750 TABLET, FILM COATED ORAL 2 TIMES DAILY PRN
Qty: 180 TABLET | Refills: 3 | Status: SHIPPED | OUTPATIENT
Start: 2023-09-22

## 2023-09-22 RX ORDER — HYDROCODONE BITARTRATE AND ACETAMINOPHEN 5; 325 MG/1; MG/1
1 TABLET ORAL EVERY 6 HOURS PRN
Qty: 80 TABLET | Refills: 0 | Status: SHIPPED | OUTPATIENT
Start: 2023-09-22 | End: 2023-10-18 | Stop reason: SDUPTHER

## 2023-09-22 RX ORDER — ETANERCEPT 50 MG/ML
50 SOLUTION SUBCUTANEOUS
Qty: 4 ML | Refills: 5 | Status: SHIPPED | OUTPATIENT
Start: 2023-09-22 | End: 2023-09-28

## 2023-09-22 RX ORDER — PRAVASTATIN SODIUM 40 MG/1
TABLET ORAL
COMMUNITY
Start: 2023-08-04 | End: 2023-09-22

## 2023-09-22 ASSESSMENT — ROUTINE ASSESSMENT OF PATIENT INDEX DATA (RAPID3)
AM STIFFNESS SCORE: 1, YES
PAIN SCORE: 5
WHEN YOU AWAKENED IN THE MORNING OVER THE LAST WEEK, PLEASE INDICATE THE AMOUNT OF TIME IT TAKES UNTIL YOU ARE AS LIMBER AS YOU WILL BE FOR THE DAY: 1
PSYCHOLOGICAL DISTRESS SCORE: 1.1
TOTAL RAPID3 SCORE: 2.72
FATIGUE SCORE: 1.5
MDHAQ FUNCTION SCORE: 0.8
PATIENT GLOBAL ASSESSMENT SCORE: 0.5

## 2023-09-22 NOTE — PROGRESS NOTES
"Subjective:       Patient ID: Angela Carlson is a 58 y.o. female.    Chief Complaint: Disease Management    HPI    F/u seroneg RA, FMS  RA since ~2004; Dr Cabrera 2004-5, then Dr Camacho until 2012, me since 2013  hx MTX and HCQ 2004-5   Enbrel ~2010, stopped when developed neutropenia; sx remitted 2013-15, then recurrent synovitis  HCQ [id number]  Enbrel restarted March 2017     Weekly Enbrel  Meloxicam daily      Some morning stiffness of L wrist  No psoriasis  Had flu Nov 2021     Still takes robaxin and nortriptyline for chronic back pain  Hydrocodone #80 Aug 22, July 25, June 23, ...     FMS and migraine HA: Dx before 2013; robaxin and doxepin; nortriptyline added 2014; hydrocodone since 2014 after failed tramadol    Low back pain after sitting in stadiums watching nieces play volleyball    Review of Systems   Constitutional:  Positive for unexpected weight change. Negative for fever.   HENT:  Negative for mouth sores and trouble swallowing.    Eyes:  Negative for redness.   Respiratory:  Positive for cough. Negative for shortness of breath.    Cardiovascular:  Negative for chest pain.   Gastrointestinal:  Negative for constipation and diarrhea.   Genitourinary:  Negative for dysuria and genital sores.   Skin:  Negative for rash.   Neurological:  Positive for headaches.   Hematological:  Does not bruise/bleed easily.           9/19/2023     9:33 AM   Rapid3 Question Responses and Scores   MDHAQ Score 0.8   Psychologic Score 1.1   Pain Score 5   When you awakened in the morning OVER THE LAST WEEK, did you feel stiff? Yes   If Yes, please indicate the number of hours until you are as limber as you will be for the day 1   Fatigue Score 1.5   Global Health Score 0.5   RAPID3 Score 2.72      Objective:   BP (!) 148/79   Pulse 75   Ht 5' 9" (1.753 m)   Wt 84.1 kg (185 lb 6.5 oz)   BMI 27.38 kg/m²      Physical Exam   Eyes: Conjunctivae are normal.   Cardiovascular: Normal rate and regular rhythm.   Pulmonary/Chest: " She has no wheezes. She has no rales.   Lymphadenopathy:     She has no cervical adenopathy.   Skin: No rash noted.         3/24/2021 3/30/2022 4/24/2023 9/22/2023   Tender (YAN-28) 0 / 28  0 / 28  0 / 28  0 / 28    Swollen (YAN-28) 5 / 28  0 / 28 1 / 28 2 / 28    Provider Global 20 mm 0 mm 10 mm 5 mm   Patient Global 55 mm 5 mm 40 mm 5 mm   ESR 5 mm/hr 20 mm/hr 24 mm/hr 17 mm/hr   CRP 3.8 mg/L 1.0 mg/L -- 0.7 mg/L   YAN-28 (ESR) 2.52 (Remission) 2.17 (Remission) 3.06 (Low disease activity) 2.45 (Remission)   YAN-28 (CRP) 2.92 (Low disease activity) 1.28 (Remission) -- 1.62 (Remission)   CDAI Score 12.5  0.5  6  3      Lab Results   Component Value Date    SEDRATE 17 09/22/2023          Assessment:       1. Spondylosis of lumbosacral region without myelopathy or radiculopathy    2. Fibromyalgia    3. Seronegative rheumatoid arthritis    4. High risk medication use            Plan:       Problem List Items Addressed This Visit          Active Problems    Seronegative rheumatoid arthritis     rheumatoid arthritis well controlled on Enbrel; some MCP enlargement but no active tenderness or other swelling or deformity    Will continue Enbrel as maintenance Rx for rheumatoid arthritis   RTC 6 mo with lab         Relevant Medications    etanercept (ENBREL SURECLICK) 50 mg/mL (1 mL)    High risk medication use (Chronic)     No clinical adverse effects to meds  Needs lab today and q6 mo for Enbrel monitoring         Fibromyalgia (Chronic)     Very physically active and managing well with current med regimen; will continue these         Relevant Medications    methocarbamoL (ROBAXIN) 750 MG Tab    Spondylosis of lumbosacral region without myelopathy or radiculopathy    Relevant Medications    HYDROcodone-acetaminophen (NORCO) 5-325 mg per tablet

## 2023-09-22 NOTE — ASSESSMENT & PLAN NOTE
rheumatoid arthritis well controlled on Enbrel; some MCP enlargement but no active tenderness or other swelling or deformity    Will continue Enbrel as maintenance Rx for rheumatoid arthritis   RTC 6 mo with lab

## 2023-09-26 DIAGNOSIS — M06.00 SERONEGATIVE RHEUMATOID ARTHRITIS: ICD-10-CM

## 2023-09-27 RX ORDER — ETANERCEPT 50 MG/ML
50 SOLUTION SUBCUTANEOUS
Qty: 4 ML | Refills: 2 | OUTPATIENT
Start: 2023-09-27

## 2023-09-28 ENCOUNTER — TELEPHONE (OUTPATIENT)
Dept: RHEUMATOLOGY | Facility: HOSPITAL | Age: 58
End: 2023-09-28
Payer: MEDICAID

## 2023-09-28 DIAGNOSIS — M06.00 SERONEGATIVE RHEUMATOID ARTHRITIS: ICD-10-CM

## 2023-09-28 RX ORDER — ETANERCEPT 50 MG/ML
50 SOLUTION SUBCUTANEOUS
Qty: 4 ML | Refills: 5 | Status: ACTIVE | OUTPATIENT
Start: 2023-09-28 | End: 2024-03-12 | Stop reason: SDUPTHER

## 2023-09-28 RX ORDER — ETANERCEPT 50 MG/ML
50 SOLUTION SUBCUTANEOUS
Qty: 4 ML | Refills: 2 | OUTPATIENT
Start: 2023-09-28

## 2023-09-28 NOTE — TELEPHONE ENCOUNTER
"----- Message from Shara Lovell PharmD sent at 9/28/2023 10:06 AM CDT -----  Regarding: Enbrel  Good morning,     [facility] set over a refill request and it was denied due to re-order but the order was "Printed" so we did not receive. Could the office send another order for the Enbrel to [facility]?    Thanks,     Shara Lovell PharmD   [facility] [place] [place] [address]  [phone] (phone)  [phone] (fax)        "

## 2023-09-30 DIAGNOSIS — L29.9 ITCHING: ICD-10-CM

## 2023-10-03 RX ORDER — HYDROXYZINE HYDROCHLORIDE 50 MG/1
TABLET, FILM COATED ORAL
Qty: 60 TABLET | Refills: 2 | Status: SHIPPED | OUTPATIENT
Start: 2023-10-03 | End: 2024-01-04

## 2023-10-18 DIAGNOSIS — M47.817 SPONDYLOSIS OF LUMBOSACRAL REGION WITHOUT MYELOPATHY OR RADICULOPATHY: ICD-10-CM

## 2023-10-18 RX ORDER — HYDROCODONE BITARTRATE AND ACETAMINOPHEN 5; 325 MG/1; MG/1
1 TABLET ORAL EVERY 6 HOURS PRN
Qty: 80 TABLET | Refills: 0 | Status: SHIPPED | OUTPATIENT
Start: 2023-10-18 | End: 2023-11-14 | Stop reason: SDUPTHER

## 2023-11-02 DIAGNOSIS — R51.9 CHRONIC NONINTRACTABLE HEADACHE, UNSPECIFIED HEADACHE TYPE: ICD-10-CM

## 2023-11-02 DIAGNOSIS — G89.29 CHRONIC NONINTRACTABLE HEADACHE, UNSPECIFIED HEADACHE TYPE: ICD-10-CM

## 2023-11-02 RX ORDER — NORTRIPTYLINE HYDROCHLORIDE 75 MG/1
CAPSULE ORAL
Qty: 180 CAPSULE | Refills: 3 | Status: SHIPPED | OUTPATIENT
Start: 2023-11-02

## 2023-11-02 NOTE — TELEPHONE ENCOUNTER
"Per last chart note on 4/6/23:         "2. Regarding Fibromyalgia and headache prevention: She is using Pamelor  (higher dose per rheumatology prior) for prevention of pain. "    Rtc 1 yr  "

## 2023-11-14 DIAGNOSIS — M47.817 SPONDYLOSIS OF LUMBOSACRAL REGION WITHOUT MYELOPATHY OR RADICULOPATHY: ICD-10-CM

## 2023-11-15 ENCOUNTER — PATIENT MESSAGE (OUTPATIENT)
Dept: RHEUMATOLOGY | Facility: CLINIC | Age: 58
End: 2023-11-15
Payer: MEDICAID

## 2023-11-15 RX ORDER — HYDROCODONE BITARTRATE AND ACETAMINOPHEN 5; 325 MG/1; MG/1
1 TABLET ORAL EVERY 6 HOURS PRN
Qty: 80 TABLET | Refills: 0 | Status: SHIPPED | OUTPATIENT
Start: 2023-11-15 | End: 2023-12-08 | Stop reason: SDUPTHER

## 2023-11-16 PROBLEM — Z83.3 FAMILY HISTORY OF DIABETES MELLITUS: Status: ACTIVE | Noted: 2023-11-16

## 2023-11-16 PROBLEM — I10 ESSENTIAL HYPERTENSION: Status: ACTIVE | Noted: 2018-11-27

## 2023-11-16 PROBLEM — N18.31 STAGE 3A CHRONIC KIDNEY DISEASE: Status: ACTIVE | Noted: 2023-11-16

## 2023-11-16 PROBLEM — E53.8 VITAMIN B 12 DEFICIENCY: Status: ACTIVE | Noted: 2023-11-16

## 2023-11-20 ENCOUNTER — PATIENT MESSAGE (OUTPATIENT)
Dept: ADMINISTRATIVE | Facility: OTHER | Age: 58
End: 2023-11-20
Payer: MEDICAID

## 2023-12-08 DIAGNOSIS — M47.817 SPONDYLOSIS OF LUMBOSACRAL REGION WITHOUT MYELOPATHY OR RADICULOPATHY: ICD-10-CM

## 2023-12-08 RX ORDER — HYDROCODONE BITARTRATE AND ACETAMINOPHEN 5; 325 MG/1; MG/1
1 TABLET ORAL EVERY 6 HOURS PRN
Qty: 80 TABLET | Refills: 0 | Status: SHIPPED | OUTPATIENT
Start: 2023-12-08 | End: 2024-01-05 | Stop reason: SDUPTHER

## 2023-12-20 ENCOUNTER — PATIENT MESSAGE (OUTPATIENT)
Dept: ADMINISTRATIVE | Facility: OTHER | Age: 58
End: 2023-12-20
Payer: MEDICAID

## 2024-01-01 DIAGNOSIS — L29.9 ITCHING: ICD-10-CM

## 2024-01-04 RX ORDER — HYDROXYZINE HYDROCHLORIDE 50 MG/1
TABLET, FILM COATED ORAL
Qty: 60 TABLET | Refills: 2 | Status: SHIPPED | OUTPATIENT
Start: 2024-01-04

## 2024-01-05 DIAGNOSIS — M47.817 SPONDYLOSIS OF LUMBOSACRAL REGION WITHOUT MYELOPATHY OR RADICULOPATHY: ICD-10-CM

## 2024-01-05 RX ORDER — HYDROCODONE BITARTRATE AND ACETAMINOPHEN 5; 325 MG/1; MG/1
1 TABLET ORAL EVERY 6 HOURS PRN
Qty: 80 TABLET | Refills: 0 | Status: SHIPPED | OUTPATIENT
Start: 2024-01-05 | End: 2024-02-02 | Stop reason: SDUPTHER

## 2024-02-02 DIAGNOSIS — M47.817 SPONDYLOSIS OF LUMBOSACRAL REGION WITHOUT MYELOPATHY OR RADICULOPATHY: ICD-10-CM

## 2024-02-02 RX ORDER — HYDROCODONE BITARTRATE AND ACETAMINOPHEN 5; 325 MG/1; MG/1
1 TABLET ORAL EVERY 6 HOURS PRN
Qty: 80 TABLET | Refills: 0 | Status: SHIPPED | OUTPATIENT
Start: 2024-02-02 | End: 2024-02-29 | Stop reason: SDUPTHER

## 2024-02-08 ENCOUNTER — PATIENT MESSAGE (OUTPATIENT)
Dept: ADMINISTRATIVE | Facility: OTHER | Age: 59
End: 2024-02-08
Payer: MEDICAID

## 2024-02-20 RX ORDER — PROMETHAZINE HYDROCHLORIDE 25 MG/1
TABLET ORAL
Qty: 15 TABLET | Refills: 5 | Status: SHIPPED | OUTPATIENT
Start: 2024-02-20

## 2024-02-29 DIAGNOSIS — M47.817 SPONDYLOSIS OF LUMBOSACRAL REGION WITHOUT MYELOPATHY OR RADICULOPATHY: ICD-10-CM

## 2024-02-29 RX ORDER — HYDROCODONE BITARTRATE AND ACETAMINOPHEN 5; 325 MG/1; MG/1
1 TABLET ORAL EVERY 6 HOURS PRN
Qty: 80 TABLET | Refills: 0 | Status: SHIPPED | OUTPATIENT
Start: 2024-02-29 | End: 2024-03-27 | Stop reason: SDUPTHER

## 2024-03-12 DIAGNOSIS — M06.00 SERONEGATIVE RHEUMATOID ARTHRITIS: ICD-10-CM

## 2024-03-12 RX ORDER — ETANERCEPT 50 MG/ML
50 SOLUTION SUBCUTANEOUS
Qty: 4 ML | Refills: 2 | Status: ACTIVE | OUTPATIENT
Start: 2024-03-12

## 2024-03-27 DIAGNOSIS — M47.817 SPONDYLOSIS OF LUMBOSACRAL REGION WITHOUT MYELOPATHY OR RADICULOPATHY: ICD-10-CM

## 2024-03-28 RX ORDER — HYDROCODONE BITARTRATE AND ACETAMINOPHEN 5; 325 MG/1; MG/1
1 TABLET ORAL EVERY 6 HOURS PRN
Qty: 80 TABLET | Refills: 0 | Status: SHIPPED | OUTPATIENT
Start: 2024-03-28 | End: 2024-04-25 | Stop reason: SDUPTHER

## 2024-04-01 NOTE — ASSESSMENT & PLAN NOTE
Currently controlled on Enbrel  Has remission YAN   No AE to Enbrel  Will cont same  
Moderate daily sx for which she is currently very functional on robaxin and hydrocodone and nortriptyline regimen    Will cont current meds  Decrease hydrocodone to 80/90 d  
No interval infections on Enbrel  Allergic to egg and cannot take flu shot  
no joint swelling/no joint erythema/no joint warmth

## 2024-04-09 ENCOUNTER — OFFICE VISIT (OUTPATIENT)
Dept: NEUROLOGY | Facility: CLINIC | Age: 59
End: 2024-04-09
Payer: MEDICAID

## 2024-04-09 VITALS
RESPIRATION RATE: 19 BRPM | DIASTOLIC BLOOD PRESSURE: 93 MMHG | HEIGHT: 69 IN | HEART RATE: 78 BPM | BODY MASS INDEX: 27.46 KG/M2 | SYSTOLIC BLOOD PRESSURE: 144 MMHG | WEIGHT: 185.44 LBS

## 2024-04-09 DIAGNOSIS — M54.31 RIGHT SIDED SCIATICA: ICD-10-CM

## 2024-04-09 DIAGNOSIS — I10 ESSENTIAL HYPERTENSION: ICD-10-CM

## 2024-04-09 DIAGNOSIS — M79.7 FIBROMYALGIA: Chronic | ICD-10-CM

## 2024-04-09 DIAGNOSIS — R51.9 CHRONIC NONINTRACTABLE HEADACHE, UNSPECIFIED HEADACHE TYPE: ICD-10-CM

## 2024-04-09 DIAGNOSIS — L29.9 ITCHING: ICD-10-CM

## 2024-04-09 DIAGNOSIS — G47.33 OSA (OBSTRUCTIVE SLEEP APNEA): Primary | ICD-10-CM

## 2024-04-09 DIAGNOSIS — G89.29 CHRONIC NONINTRACTABLE HEADACHE, UNSPECIFIED HEADACHE TYPE: ICD-10-CM

## 2024-04-09 DIAGNOSIS — E78.49 OTHER HYPERLIPIDEMIA: Chronic | ICD-10-CM

## 2024-04-09 DIAGNOSIS — R29.898 LEFT HAND WEAKNESS: ICD-10-CM

## 2024-04-09 DIAGNOSIS — N18.31 STAGE 3A CHRONIC KIDNEY DISEASE: ICD-10-CM

## 2024-04-09 DIAGNOSIS — E53.8 VITAMIN B 12 DEFICIENCY: ICD-10-CM

## 2024-04-09 PROBLEM — T63.331A BROWN RECLUSE SPIDER BITE: Status: RESOLVED | Noted: 2023-04-06 | Resolved: 2024-04-09

## 2024-04-09 PROBLEM — L03.114 CELLULITIS OF LEFT UPPER EXTREMITY: Status: RESOLVED | Noted: 2022-12-25 | Resolved: 2024-04-09

## 2024-04-09 PROCEDURE — 99214 OFFICE O/P EST MOD 30 MIN: CPT | Mod: S$PBB,,, | Performed by: NURSE PRACTITIONER

## 2024-04-09 PROCEDURE — 3008F BODY MASS INDEX DOCD: CPT | Mod: CPTII,,, | Performed by: NURSE PRACTITIONER

## 2024-04-09 PROCEDURE — 3077F SYST BP >= 140 MM HG: CPT | Mod: CPTII,,, | Performed by: NURSE PRACTITIONER

## 2024-04-09 PROCEDURE — 99214 OFFICE O/P EST MOD 30 MIN: CPT | Mod: PBBFAC | Performed by: NURSE PRACTITIONER

## 2024-04-09 PROCEDURE — 1159F MED LIST DOCD IN RCRD: CPT | Mod: CPTII,,, | Performed by: NURSE PRACTITIONER

## 2024-04-09 PROCEDURE — 3080F DIAST BP >= 90 MM HG: CPT | Mod: CPTII,,, | Performed by: NURSE PRACTITIONER

## 2024-04-09 PROCEDURE — 99999 PR PBB SHADOW E&M-EST. PATIENT-LVL IV: CPT | Mod: PBBFAC,,, | Performed by: NURSE PRACTITIONER

## 2024-04-09 RX ORDER — NORTRIPTYLINE HYDROCHLORIDE 75 MG/1
CAPSULE ORAL
Qty: 180 CAPSULE | Refills: 3 | Status: SHIPPED | OUTPATIENT
Start: 2024-04-09

## 2024-04-09 RX ORDER — HYDROXYZINE HYDROCHLORIDE 50 MG/1
TABLET, FILM COATED ORAL
Qty: 60 TABLET | Refills: 2 | Status: SHIPPED | OUTPATIENT
Start: 2024-04-09

## 2024-04-09 NOTE — PROGRESS NOTES
HPI: Angela Carlson is a 58 y.o. female with migraine with aura and with  cervicogenic trigger possibly, although MRI C spine with only minor changes. Some complicated migraine with some alteration of consciousness at times-resolved. Anxiety and depression. She has TREY. Symptoms of lumbar radicular pain down the left leg with MRI showing disc bulge and nerve root displacement at S1 on the left 2015. She has HTN and hypothyroidism, as well as fibromyalgia. She sees Rheumatology for seronegative RA. She has HTN and HLD.     Patient lives in [place].     She is here today for her annual follow up. She continues with left hand weakness with a burning sensation with flexion when her skin stretches. She is s/p surgery/wound debridement for a brown recluse spider bite to the left hand. She still has some weakness to the left hand, secondary to a tendon issues per patient.     She is compliant with CPAP and obtained a new nasal mask. Daytime fatigue is much improved since she has been using her CPAP.     Her headaches are well controlled, and only rare. She takes Pamelor.     Insomnia is improved currently.     She is also on Propranolol for her HTN from Endocrinology, whom she sees for her thyroid issues. BP elevated today.     Her back pain is well controlled. Fibro and RA pain currently well controlled on current treatment. [place] [facility] [place]     She is rarely using Atarax as needed and same with Phenergan (not daily).     She lives in [place].     Review of Systems   Constitutional:  Negative for malaise/fatigue.   Eyes:  Negative for blurred vision and double vision.   Cardiovascular:  Negative for palpitations and leg swelling.   Musculoskeletal:  Positive for myalgias. Negative for falls, joint pain and neck pain.   Skin:  Negative for rash.   Neurological:  Negative for dizziness, tingling, sensory change, weakness and headaches.   Endo/Heme/Allergies:  Does not bruise/bleed easily.    Psychiatric/Behavioral:  Negative for depression and memory loss. The patient has insomnia. The patient is not nervous/anxious.      Exam:   Gen Appearance, well developed/nourished in no apparent distress  CV: 2+ distal pulses with no edema or swelling  Neuro:  MS: Awake, alert, Sustains attention. Recent/remote memory intact, Language is full to spontaneous speech and comprehension.  Fund of Knowledge is full  CN: PERRL, Extraoccular movements and visual fields are full. Normal facial sensation and strength, Hearing symmetric, Tongue and Palate are midline and strong. Shoulder Shrug symmetric and strong.  Motor: Normal bulk, tone, no abnormal movements. 5/5 strength bilateral upper/lower extremities with 2+ reflexes  Sensory: Romberg negative and intact to temp, vibration  Cerebellar: Finger-nose, Rapid alternating movements intact  Gait: Normal stance, no ataxia    Labs:   CBC normal in 2/2017  2018 B12 just above the normal range  2019 CBC reviewed    Assessment/Recommendation: Angela Carlson is a 58 y.o. female with migraine with aura and with  cervicogenic trigger possibly, although MRI C spine with only minor changes. Some complicated migraine with some alteration of consciousness at times-resolved. Anxiety and depression. She has  TREY.   Symptoms of lumbar radicular pain down the left leg with MRI showing disc bulge and nerve root displacement at S1 on the left 2015.     I recommend:   1. Saw spine surgery prior-conservative measures recommended. Back pain is currently episodic and responds to rest.     2. Regarding Fibromyalgia and headache prevention: She is using Pamelor (higher dose per rheumatology prior) for prevention of pain.   -Off of Gabapentin now, due to nausea, which resolved after cessation.   -She tried Lyrica and Zonegran prior and she is off of Cymbalta now.     3. Can continue Atarax PRN (don't use atarax with Phenergan) for anxiety. Phenergan for nausea associated with headache- rare  use only recommended. Watch for sedation with meds.  - Rheumatology also managing hydrocodone for pain with RA.     4. She completed another CPAP titration study in 11/2021, which showed sleep disordered breathing.   Continue CPAP for TREY.     5. Prior spells resolved/could have been complicated migraine. Headaches are greatly improved.     6. She has switched from B12 injections to oral B12 supplementation.     7. She takes high dose Pamelor for her insomnia without relief, as well as other sedating agents. She failed Melatonin. She slept better with her CPAP prior, but she may discuss ongoing insomnia with PCP.     8. Will refer to PT/OT for right sciatica and left hand weakness post debridement for brown recluse spider bite.     RTC 1 year

## 2024-04-22 ENCOUNTER — PATIENT MESSAGE (OUTPATIENT)
Dept: ADMINISTRATIVE | Facility: OTHER | Age: 59
End: 2024-04-22
Payer: MEDICAID

## 2024-04-25 DIAGNOSIS — M47.817 SPONDYLOSIS OF LUMBOSACRAL REGION WITHOUT MYELOPATHY OR RADICULOPATHY: ICD-10-CM

## 2024-04-25 RX ORDER — HYDROCODONE BITARTRATE AND ACETAMINOPHEN 5; 325 MG/1; MG/1
1 TABLET ORAL EVERY 6 HOURS PRN
Qty: 80 TABLET | Refills: 0 | Status: SHIPPED | OUTPATIENT
Start: 2024-04-27 | End: 2024-05-22 | Stop reason: SDUPTHER

## 2024-05-22 DIAGNOSIS — M47.817 SPONDYLOSIS OF LUMBOSACRAL REGION WITHOUT MYELOPATHY OR RADICULOPATHY: ICD-10-CM

## 2024-05-23 RX ORDER — HYDROCODONE BITARTRATE AND ACETAMINOPHEN 5; 325 MG/1; MG/1
1 TABLET ORAL EVERY 6 HOURS PRN
Qty: 80 TABLET | Refills: 0 | Status: SHIPPED | OUTPATIENT
Start: 2024-05-23

## 2024-06-17 DIAGNOSIS — M06.00 SERONEGATIVE RHEUMATOID ARTHRITIS: ICD-10-CM

## 2024-06-18 RX ORDER — ETANERCEPT 50 MG/ML
50 SOLUTION SUBCUTANEOUS
Qty: 4 ML | Refills: 2 | OUTPATIENT
Start: 2024-06-18

## 2024-06-22 DIAGNOSIS — M47.817 SPONDYLOSIS OF LUMBOSACRAL REGION WITHOUT MYELOPATHY OR RADICULOPATHY: ICD-10-CM

## 2024-06-24 DIAGNOSIS — M06.00 SERONEGATIVE RHEUMATOID ARTHRITIS: ICD-10-CM

## 2024-06-24 RX ORDER — HYDROCODONE BITARTRATE AND ACETAMINOPHEN 5; 325 MG/1; MG/1
1 TABLET ORAL EVERY 6 HOURS PRN
Qty: 80 TABLET | Refills: 0 | Status: SHIPPED | OUTPATIENT
Start: 2024-06-24

## 2024-06-25 ENCOUNTER — PATIENT MESSAGE (OUTPATIENT)
Dept: RHEUMATOLOGY | Facility: CLINIC | Age: 59
End: 2024-06-25
Payer: MEDICAID

## 2024-06-25 RX ORDER — ETANERCEPT 50 MG/ML
50 SOLUTION SUBCUTANEOUS
Qty: 4 ML | Refills: 0 | OUTPATIENT
Start: 2024-06-25

## 2024-06-25 RX ORDER — ETANERCEPT 50 MG/ML
50 SOLUTION SUBCUTANEOUS
Qty: 4 ML | Refills: 2 | Status: SHIPPED | OUTPATIENT
Start: 2024-06-25

## 2024-07-18 ENCOUNTER — PATIENT MESSAGE (OUTPATIENT)
Dept: ADMINISTRATIVE | Facility: OTHER | Age: 59
End: 2024-07-18
Payer: MEDICAID

## 2024-07-23 DIAGNOSIS — M47.817 SPONDYLOSIS OF LUMBOSACRAL REGION WITHOUT MYELOPATHY OR RADICULOPATHY: ICD-10-CM

## 2024-07-24 ENCOUNTER — PATIENT MESSAGE (OUTPATIENT)
Dept: RHEUMATOLOGY | Facility: CLINIC | Age: 59
End: 2024-07-24
Payer: MEDICAID

## 2024-07-25 RX ORDER — HYDROCODONE BITARTRATE AND ACETAMINOPHEN 5; 325 MG/1; MG/1
1 TABLET ORAL EVERY 6 HOURS PRN
Qty: 80 TABLET | Refills: 0 | Status: SHIPPED | OUTPATIENT
Start: 2024-07-25

## 2024-08-12 ENCOUNTER — PATIENT MESSAGE (OUTPATIENT)
Dept: ADMINISTRATIVE | Facility: OTHER | Age: 59
End: 2024-08-12
Payer: MEDICAID

## 2024-08-22 DIAGNOSIS — M47.817 SPONDYLOSIS OF LUMBOSACRAL REGION WITHOUT MYELOPATHY OR RADICULOPATHY: ICD-10-CM

## 2024-08-22 RX ORDER — HYDROCODONE BITARTRATE AND ACETAMINOPHEN 5; 325 MG/1; MG/1
1 TABLET ORAL EVERY 6 HOURS PRN
Qty: 80 TABLET | Refills: 0 | Status: SHIPPED | OUTPATIENT
Start: 2024-08-22

## 2024-08-26 ENCOUNTER — OFFICE VISIT (OUTPATIENT)
Dept: RHEUMATOLOGY | Facility: CLINIC | Age: 59
End: 2024-08-26
Payer: MEDICAID

## 2024-08-26 VITALS
HEART RATE: 72 BPM | SYSTOLIC BLOOD PRESSURE: 145 MMHG | HEIGHT: 69 IN | WEIGHT: 186.5 LBS | DIASTOLIC BLOOD PRESSURE: 81 MMHG | BODY MASS INDEX: 27.62 KG/M2

## 2024-08-26 DIAGNOSIS — Z79.899 HIGH RISK MEDICATION USE: Chronic | ICD-10-CM

## 2024-08-26 DIAGNOSIS — M06.00 SERONEGATIVE RHEUMATOID ARTHRITIS: Primary | ICD-10-CM

## 2024-08-26 DIAGNOSIS — M79.7 FIBROMYALGIA: Chronic | ICD-10-CM

## 2024-08-26 PROCEDURE — 3077F SYST BP >= 140 MM HG: CPT | Mod: CPTII,,, | Performed by: INTERNAL MEDICINE

## 2024-08-26 PROCEDURE — 99213 OFFICE O/P EST LOW 20 MIN: CPT | Mod: PBBFAC | Performed by: INTERNAL MEDICINE

## 2024-08-26 PROCEDURE — 99999 PR PBB SHADOW E&M-EST. PATIENT-LVL III: CPT | Mod: PBBFAC,,, | Performed by: INTERNAL MEDICINE

## 2024-08-26 PROCEDURE — 3079F DIAST BP 80-89 MM HG: CPT | Mod: CPTII,,, | Performed by: INTERNAL MEDICINE

## 2024-08-26 PROCEDURE — 3008F BODY MASS INDEX DOCD: CPT | Mod: CPTII,,, | Performed by: INTERNAL MEDICINE

## 2024-08-26 PROCEDURE — 1159F MED LIST DOCD IN RCRD: CPT | Mod: CPTII,,, | Performed by: INTERNAL MEDICINE

## 2024-08-26 PROCEDURE — 99214 OFFICE O/P EST MOD 30 MIN: CPT | Mod: S$PBB,,, | Performed by: INTERNAL MEDICINE

## 2024-08-26 PROCEDURE — 1160F RVW MEDS BY RX/DR IN RCRD: CPT | Mod: CPTII,,, | Performed by: INTERNAL MEDICINE

## 2024-08-26 RX ORDER — METHOCARBAMOL 750 MG/1
750 TABLET, FILM COATED ORAL 2 TIMES DAILY PRN
Qty: 180 TABLET | Refills: 3 | Status: SHIPPED | OUTPATIENT
Start: 2024-08-26

## 2024-08-26 ASSESSMENT — ROUTINE ASSESSMENT OF PATIENT INDEX DATA (RAPID3)
AM STIFFNESS SCORE: 1, YES
PSYCHOLOGICAL DISTRESS SCORE: 1.1
FATIGUE SCORE: 5
WHEN YOU AWAKENED IN THE MORNING OVER THE LAST WEEK, PLEASE INDICATE THE AMOUNT OF TIME IT TAKES UNTIL YOU ARE AS LIMBER AS YOU WILL BE FOR THE DAY: 1
TOTAL RAPID3 SCORE: 4.11
MDHAQ FUNCTION SCORE: 0.7
PAIN SCORE: 8.5
PATIENT GLOBAL ASSESSMENT SCORE: 1.5

## 2024-08-26 NOTE — PROGRESS NOTES
"Subjective:       Patient ID: Angela Carlson is a 59 y.o. female.    Chief Complaint: Disease Management    HPI      F/u seroneg RA, FMS  RA since ~2004; Dr Cabrera 2004-5, then Dr Camacho until 2012, me since 2013  hx MTX and HCQ 2004-5   Enbrel ~2010, stopped when developed neutropenia; sx remitted 2013-15, then recurrent synovitis  HCQ 4911-2386  Enbrel restarted March 2017     Weekly Enbrel  Methocarbamol  Hydrocodone  Nortriptyline 150 hs     Some morning stiffness of L wrist  No psoriasis  Had flu Nov 2021     Still takes robaxin and nortriptyline for chronic back pain  Hydrocodone #80 Aug 22, July 25, June 23, ...     FMS and migraine HA: Dx before 2013; robaxin and doxepin; nortriptyline added 2014; hydrocodone since 2014 after failed tramadol    LV Sept 2023  No interval issues  Still on all meds which she reports work    :  Hydrocodone: #80 8/22, 7/25, 6/24    Review of Systems   Constitutional:  Positive for unexpected weight change. Negative for fever.   HENT:  Negative for mouth sores and trouble swallowing.    Eyes:  Negative for redness.   Respiratory:  Positive for cough. Negative for shortness of breath.    Cardiovascular:  Negative for chest pain.   Gastrointestinal:  Negative for constipation and diarrhea.   Genitourinary:  Negative for dysuria and genital sores.   Skin:  Negative for rash.   Neurological:  Negative for headaches.   Hematological:  Does not bruise/bleed easily.       .      8/20/2024    12:16 PM   Rapid3 Question Responses and Scores   MDHAQ Score 0.7   Psychologic Score 1.1   Pain Score 8.5   When you awakened in the morning OVER THE LAST WEEK, did you feel stiff? Yes   If Yes, please indicate the number of hours until you are as limber as you will be for the day 1   Fatigue Score 5   Global Health Score 1.5   RAPID3 Score 4.11      Objective:   BP (!) 145/81   Pulse 72   Ht 5' 9" (1.753 m)   Wt 84.6 kg (186 lb 8.2 oz)   BMI 27.54 kg/m²      Physical Exam   Eyes: " Conjunctivae are normal.   Cardiovascular: Normal rate and regular rhythm.   Pulmonary/Chest: She has no wheezes. She has no rales.   Musculoskeletal:         General: Deformity present. No swelling or tenderness.      Comments: Enlarged MCP 1-3 R and 2, 3 L and mild interosseous atrophy dorsum R>L hand   Lymphadenopathy:     She has no cervical adenopathy.   Skin: No rash noted.          3/30/2022 4/24/2023 9/22/2023 8/26/2024   Tender (YAN-28) 0 / 28  0 / 28  0 / 28  0 / 28    Swollen (YAN-28) 0 / 28 1 / 28 2 / 28  0 / 28    Provider Global 0 mm 10 mm 5 mm 15 mm   Patient Global 5 mm 40 mm 5 mm 15 mm   ESR 20 mm/hr 24 mm/hr 17 mm/hr 13 mm/hr   CRP 1.0 mg/L -- 0.7 mg/L 0.8 mg/L   YAN-28 (ESR) 2.17 (Remission) 3.06 (Low disease activity) 2.45 (Remission) 2.01 (Remission)   YAN-28 (CRP) 1.28 (Remission) -- 1.62 (Remission) 1.38 (Remission)   CDAI Score 0.5  6  3  3       Lab Results   Component Value Date    SEDRATE 13 06/27/2024      Lab Results   Component Value Date    CRP 0.8 06/27/2024      Assessment:       1. Seronegative rheumatoid arthritis    2. High risk medication use    3. Fibromyalgia            Plan:       Problem List Items Addressed This Visit          Active Problems    High risk medication use (Chronic)     No treatment related adverse effects; will continue to monitor for drug toxicity while on chronic treatment with Enbrel         Fibromyalgia (Chronic)     Functions at a high level with current regimen including hydrocodone (80/mo) , methocarbamol and hs nortriptyline         Relevant Medications    methocarbamoL (ROBAXIN) 750 MG Tab    Seronegative rheumatoid arthritis - Primary     Rheumatoid arthritis is well controlled on Enbrel monotherapy    Will continue Enbrel for rheumatoid arthritis   Recheck Quantiferon with other labs in Dec; Return To Clinic after

## 2024-08-26 NOTE — ASSESSMENT & PLAN NOTE
Functions at a high level with current regimen including hydrocodone (80/mo) , methocarbamol and hs nortriptyline

## 2024-08-26 NOTE — PROGRESS NOTES
8/20/2024    12:16 PM   Rapid3 Question Responses and Scores   MDHAQ Score 0.7   Psychologic Score 1.1   Pain Score 8.5   When you awakened in the morning OVER THE LAST WEEK, did you feel stiff? Yes   If Yes, please indicate the number of hours until you are as limber as you will be for the day 1   Fatigue Score 5   Global Health Score 1.5   RAPID3 Score 4.11         Answers submitted by the patient for this visit:  Rheumatology Questionnaire (Submitted on 8/20/2024)  fever: No  eye redness: No  mouth sores: No  headaches: No  shortness of breath: No  chest pain: No  trouble swallowing: No  diarrhea: No  constipation: No  unexpected weight change: Yes  genital sore: No  dysuria: No  During the last 3 days, have you had a skin rash?: No  Bruises or bleeds easily: No  cough: Yes

## 2024-08-26 NOTE — ASSESSMENT & PLAN NOTE
Rheumatoid arthritis is well controlled on Enbrel monotherapy    Will continue Enbrel for rheumatoid arthritis   Recheck Quantiferon with other labs in Dec; Return To Clinic after

## 2024-08-26 NOTE — ASSESSMENT & PLAN NOTE
No treatment related adverse effects; will continue to monitor for drug toxicity while on chronic treatment with Enbrel

## 2024-09-05 DIAGNOSIS — L29.9 ITCHING: ICD-10-CM

## 2024-09-05 RX ORDER — HYDROXYZINE HYDROCHLORIDE 50 MG/1
TABLET, FILM COATED ORAL
Qty: 60 TABLET | Refills: 2 | Status: SHIPPED | OUTPATIENT
Start: 2024-09-05

## 2024-09-06 NOTE — TELEPHONE ENCOUNTER
Transferred Enbrel back to [facility]    Detail Level: Detailed Quality 226: Preventive Care And Screening: Tobacco Use: Screening And Cessation Intervention: Patient screened for tobacco use and is an ex/non-smoker Quality 431: Preventive Care And Screening: Unhealthy Alcohol Use - Screening: Patient not identified as an unhealthy alcohol user when screened for unhealthy alcohol use using a systematic screening method Quality 130: Documentation Of Current Medications In The Medical Record: Current Medications Documented

## 2024-09-09 DIAGNOSIS — M06.00 SERONEGATIVE RHEUMATOID ARTHRITIS: Primary | ICD-10-CM

## 2024-09-09 RX ORDER — ETANERCEPT 50 MG/ML
50 SOLUTION SUBCUTANEOUS
Qty: 4 ML | Refills: 2 | Status: ACTIVE | OUTPATIENT
Start: 2024-09-09

## 2024-09-20 DIAGNOSIS — M47.817 SPONDYLOSIS OF LUMBOSACRAL REGION WITHOUT MYELOPATHY OR RADICULOPATHY: ICD-10-CM

## 2024-09-21 RX ORDER — HYDROCODONE BITARTRATE AND ACETAMINOPHEN 5; 325 MG/1; MG/1
1 TABLET ORAL EVERY 6 HOURS PRN
Qty: 80 TABLET | Refills: 0 | Status: SHIPPED | OUTPATIENT
Start: 2024-09-21

## 2024-10-11 ENCOUNTER — PATIENT MESSAGE (OUTPATIENT)
Dept: ADMINISTRATIVE | Facility: OTHER | Age: 59
End: 2024-10-11
Payer: MEDICAID

## 2024-10-21 DIAGNOSIS — M47.817 SPONDYLOSIS OF LUMBOSACRAL REGION WITHOUT MYELOPATHY OR RADICULOPATHY: ICD-10-CM

## 2024-10-22 RX ORDER — HYDROCODONE BITARTRATE AND ACETAMINOPHEN 5; 325 MG/1; MG/1
1 TABLET ORAL EVERY 6 HOURS PRN
Qty: 80 TABLET | Refills: 0 | Status: SHIPPED | OUTPATIENT
Start: 2024-10-22

## 2024-11-19 DIAGNOSIS — M47.817 SPONDYLOSIS OF LUMBOSACRAL REGION WITHOUT MYELOPATHY OR RADICULOPATHY: ICD-10-CM

## 2024-11-19 RX ORDER — HYDROCODONE BITARTRATE AND ACETAMINOPHEN 5; 325 MG/1; MG/1
1 TABLET ORAL EVERY 6 HOURS PRN
Qty: 80 TABLET | Refills: 0 | Status: SHIPPED | OUTPATIENT
Start: 2024-11-19

## 2024-11-25 DIAGNOSIS — L29.9 ITCHING: ICD-10-CM

## 2024-11-26 RX ORDER — HYDROXYZINE HYDROCHLORIDE 50 MG/1
TABLET, FILM COATED ORAL
Qty: 60 TABLET | Refills: 2 | Status: SHIPPED | OUTPATIENT
Start: 2024-11-26

## 2024-12-04 ENCOUNTER — PATIENT MESSAGE (OUTPATIENT)
Dept: RHEUMATOLOGY | Facility: CLINIC | Age: 59
End: 2024-12-04
Payer: MEDICAID

## 2024-12-04 DIAGNOSIS — M06.00 SERONEGATIVE RHEUMATOID ARTHRITIS: ICD-10-CM

## 2024-12-05 RX ORDER — ETANERCEPT 50 MG/ML
50 SOLUTION SUBCUTANEOUS
Qty: 4 ML | Refills: 2 | Status: ACTIVE | OUTPATIENT
Start: 2024-12-05

## 2024-12-18 ENCOUNTER — PATIENT MESSAGE (OUTPATIENT)
Dept: RHEUMATOLOGY | Facility: CLINIC | Age: 59
End: 2024-12-18
Payer: MEDICAID

## 2024-12-18 DIAGNOSIS — M47.817 SPONDYLOSIS OF LUMBOSACRAL REGION WITHOUT MYELOPATHY OR RADICULOPATHY: ICD-10-CM

## 2024-12-18 RX ORDER — HYDROCODONE BITARTRATE AND ACETAMINOPHEN 5; 325 MG/1; MG/1
1 TABLET ORAL EVERY 6 HOURS PRN
Qty: 80 TABLET | Refills: 0 | Status: SHIPPED | OUTPATIENT
Start: 2024-12-19

## 2024-12-18 NOTE — TELEPHONE ENCOUNTER
Medication refill requested for [place].  Last office visit on 8/26/24 and labs done on 12/3/24. Order pended.

## 2024-12-26 ENCOUNTER — PATIENT MESSAGE (OUTPATIENT)
Dept: NEUROLOGY | Facility: CLINIC | Age: 59
End: 2024-12-26
Payer: MEDICAID

## 2024-12-26 NOTE — TELEPHONE ENCOUNTER
Spoke with patient, I informed her we can start a PA for the rx to see if her insurance will cover it.    PA initiated.

## 2025-01-01 ENCOUNTER — PATIENT MESSAGE (OUTPATIENT)
Dept: ADMINISTRATIVE | Facility: OTHER | Age: 60
End: 2025-01-01
Payer: MEDICAID

## 2025-01-16 ENCOUNTER — PATIENT MESSAGE (OUTPATIENT)
Dept: RHEUMATOLOGY | Facility: CLINIC | Age: 60
End: 2025-01-16
Payer: MEDICAID

## 2025-01-16 ENCOUNTER — E-VISIT (OUTPATIENT)
Dept: URGENT CARE | Facility: CLINIC | Age: 60
End: 2025-01-16
Payer: MEDICAID

## 2025-01-16 DIAGNOSIS — M47.817 SPONDYLOSIS OF LUMBOSACRAL REGION WITHOUT MYELOPATHY OR RADICULOPATHY: ICD-10-CM

## 2025-01-16 DIAGNOSIS — M06.00 SERONEGATIVE RHEUMATOID ARTHRITIS: ICD-10-CM

## 2025-01-16 NOTE — TELEPHONE ENCOUNTER
Patient is requesting an refill for [place]. Last office visit was 08/24/24, labs were on 12/03/24 and last refill was on 12/19/24. Order pended.

## 2025-01-17 RX ORDER — HYDROCODONE BITARTRATE AND ACETAMINOPHEN 5; 325 MG/1; MG/1
1 TABLET ORAL EVERY 6 HOURS PRN
Qty: 80 TABLET | Refills: 0 | Status: SHIPPED | OUTPATIENT
Start: 2025-01-17 | End: 2025-01-29 | Stop reason: SDUPTHER

## 2025-01-24 RX ORDER — ETANERCEPT 50 MG/ML
50 SOLUTION SUBCUTANEOUS
Qty: 4 ML | Refills: 2 | Status: SHIPPED | OUTPATIENT
Start: 2025-01-24 | End: 2025-01-24

## 2025-01-24 RX ORDER — ETANERCEPT 50 MG/ML
50 SOLUTION SUBCUTANEOUS
Qty: 4 ML | Refills: 2 | Status: SHIPPED | OUTPATIENT
Start: 2025-01-24

## 2025-01-24 NOTE — PROGRESS NOTES
Patient ID: Angela Carlson is a 59 y.o. female.    Chief Complaint: General Illness (Entered automatically based on patient selection in [employer].) and Medication Refill    The patient initiated a request through [employer] on 1/16/2025 for evaluation and management with a chief complaint of General Illness (Entered automatically based on patient selection in [employer].) and Medication Refill     I evaluated the questionnaire submission on 1/24/20255.    [facility] E[facility]-Medication    1/24/2025 10:06 AM CST - Filed by Patient   What do you need help with? Medication Management   Do you agree to participate in an E-Visit? Yes   If you have any of the following symptoms, please present to your local emergency room or call 911:  I acknowledge   Medication requests for narcotics will not be addressed via an E-Visit.  Please schedule an appointment. I acknowledge   Do you want to address a new or existing medication? I would like to address a medication I currently take   What is the main issue you would like addressed today? Refill   Would you like to change or continue your medication? Continue medication   What medication would you like to continue?  Embrel   Are you taking it as prescribed? Yes    What medical condition is the  medication intended to treat? RA   Is the medication helping your condition? Yes   Are you having any side effects from the medication? No   Provide any additional information you feel is important.    Please attach any relevant images or files    Are you able to take your vital signs? No         Encounter Diagnosis   Name Primary?    Seronegative rheumatoid arthritis         No orders of the defined types were placed in this encounter.     Medications Ordered This Encounter   Medications    etanercept (ENBREL SURECLICK) 50 mg/mL (1 mL)     Sig: Inject 1 mL (50 mg total) into the skin every 7 days.     Dispense:  4 mL     Refill:  2        Follow up if symptoms worsen or fail to  improve.      E-Visit Time Tracking:    Day 1 Time (in minutes): 5    Total Time (in minutes): 5

## 2025-01-29 ENCOUNTER — OFFICE VISIT (OUTPATIENT)
Dept: RHEUMATOLOGY | Facility: CLINIC | Age: 60
End: 2025-01-29
Payer: MEDICAID

## 2025-01-29 VITALS
BODY MASS INDEX: 26.77 KG/M2 | SYSTOLIC BLOOD PRESSURE: 153 MMHG | WEIGHT: 180.75 LBS | HEIGHT: 69 IN | HEART RATE: 70 BPM | DIASTOLIC BLOOD PRESSURE: 88 MMHG

## 2025-01-29 DIAGNOSIS — M79.7 FIBROMYALGIA: Chronic | ICD-10-CM

## 2025-01-29 DIAGNOSIS — M47.817 SPONDYLOSIS OF LUMBOSACRAL REGION WITHOUT MYELOPATHY OR RADICULOPATHY: ICD-10-CM

## 2025-01-29 DIAGNOSIS — M06.00 SERONEGATIVE RHEUMATOID ARTHRITIS: Primary | ICD-10-CM

## 2025-01-29 DIAGNOSIS — Z79.899 HIGH RISK MEDICATION USE: Chronic | ICD-10-CM

## 2025-01-29 PROCEDURE — 3079F DIAST BP 80-89 MM HG: CPT | Mod: CPTII,,, | Performed by: INTERNAL MEDICINE

## 2025-01-29 PROCEDURE — 3008F BODY MASS INDEX DOCD: CPT | Mod: CPTII,,, | Performed by: INTERNAL MEDICINE

## 2025-01-29 PROCEDURE — 1159F MED LIST DOCD IN RCRD: CPT | Mod: CPTII,,, | Performed by: INTERNAL MEDICINE

## 2025-01-29 PROCEDURE — 99213 OFFICE O/P EST LOW 20 MIN: CPT | Mod: PBBFAC | Performed by: INTERNAL MEDICINE

## 2025-01-29 PROCEDURE — 99999 PR PBB SHADOW E&M-EST. PATIENT-LVL III: CPT | Mod: PBBFAC,,, | Performed by: INTERNAL MEDICINE

## 2025-01-29 PROCEDURE — 1160F RVW MEDS BY RX/DR IN RCRD: CPT | Mod: CPTII,,, | Performed by: INTERNAL MEDICINE

## 2025-01-29 PROCEDURE — 3077F SYST BP >= 140 MM HG: CPT | Mod: CPTII,,, | Performed by: INTERNAL MEDICINE

## 2025-01-29 PROCEDURE — 99214 OFFICE O/P EST MOD 30 MIN: CPT | Mod: S$PBB,,, | Performed by: INTERNAL MEDICINE

## 2025-01-29 RX ORDER — HYDROCODONE BITARTRATE AND ACETAMINOPHEN 5; 325 MG/1; MG/1
1 TABLET ORAL EVERY 6 HOURS PRN
Qty: 80 TABLET | Refills: 0 | Status: SHIPPED | OUTPATIENT
Start: 2025-02-19

## 2025-01-29 ASSESSMENT — ROUTINE ASSESSMENT OF PATIENT INDEX DATA (RAPID3)
PAIN SCORE: 6
AM STIFFNESS SCORE: 1, YES
TOTAL RAPID3 SCORE: 4.83
PSYCHOLOGICAL DISTRESS SCORE: 1.1
MDHAQ FUNCTION SCORE: 0.9
FATIGUE SCORE: 5.5
PATIENT GLOBAL ASSESSMENT SCORE: 5.5
WHEN YOU AWAKENED IN THE MORNING OVER THE LAST WEEK, PLEASE INDICATE THE AMOUNT OF TIME IT TAKES UNTIL YOU ARE AS LIMBER AS YOU WILL BE FOR THE DAY: 1

## 2025-01-29 NOTE — PROGRESS NOTES
"Subjective:       Patient ID: Angela Carlson is a 59 y.o. female.    Chief Complaint: Disease Management and Follow-up    HPI      F/u seroneg RA, FMS  RA since ~2004; Dr Cabrera 2004-5, then Dr Camacho until 2012, me since 2013  hx MTX and HCQ 2004-5   Enbrel ~2010, stopped when developed neutropenia; sx remitted 2013-15, then recurrent synovitis  HCQ 7835-0740  Enbrel restarted March 2017     Weekly Enbrel  Methocarbamol  Hydrocodone  Nortriptyline 150 hs     Some morning stiffness of L wrist  No psoriasis  Had flu Nov 2021     Still takes robaxin and nortriptyline for chronic back pain  Hydrocodone #80 Aug 22, July 25, June 23, ...     FMS and migraine HA: Dx before 2013; robaxin and doxepin; nortriptyline added 2014; hydrocodone since 2014 after failed tramadol     :  Hydrocodone: #80 1/18/25, 12/19/24, 11/20/24    Driving [employer]  LV Aug 2024  No interval issues  Still on all meds which she reports work    Review of Systems   Constitutional:  Positive for unexpected weight change. Negative for fever.   HENT:  Negative for mouth sores and trouble swallowing.    Eyes:  Negative for redness.   Respiratory:  Negative for cough and shortness of breath.    Cardiovascular:  Negative for chest pain.   Gastrointestinal:  Negative for constipation and diarrhea.   Genitourinary:  Negative for dysuria and genital sores.   Skin:  Negative for rash.   Neurological:  Negative for headaches.   Hematological:  Does not bruise/bleed easily.         Objective:   BP (!) 153/88 (BP Location: Left arm, Patient Position: Sitting)   Pulse 70   Ht 5' 9" (1.753 m)   Wt 82 kg (180 lb 12.4 oz)   BMI 26.70 kg/m²      Physical Exam   Cardiovascular: Regular rhythm.   Pulmonary/Chest: Breath sounds normal.   Musculoskeletal:         General: No swelling or tenderness.   Skin: No rash noted.         Assessment:       1. Seronegative rheumatoid arthritis    2. Spondylosis of lumbosacral region without myelopathy or radiculopathy    3. High " risk medication use    4. Fibromyalgia            Plan:       Problem List Items Addressed This Visit          Active Problems    High risk medication use (Chronic)     No treatment related adverse effects; will continue to monitor for drug toxicity           Fibromyalgia (Chronic)     Very stable and physically active on current regimen         Seronegative rheumatoid arthritis - Primary     Continues in remission on Enbrel; no adverse effects so will continue this with lab and follow up  6 mo         Spondylosis of lumbosacral region without myelopathy or radiculopathy    Relevant Medications    HYDROcodone-acetaminophen (NORCO) 5-325 mg per tablet (Start on 2/19/2025)

## 2025-01-29 NOTE — PROGRESS NOTES
1/27/2025     5:10 PM   Rapid3 Question Responses and Scores   MDHAQ Score 0.9   Psychologic Score 1.1   Pain Score 6   When you awakened in the morning OVER THE LAST WEEK, did you feel stiff? Yes   If Yes, please indicate the number of hours until you are as limber as you will be for the day 1   Fatigue Score 5.5   Global Health Score 5.5   RAPID3 Score 4.83        Answers submitted by the patient for this visit:  Rheumatology Questionnaire (Submitted on 1/27/2025)  fever: No  eye redness: No  mouth sores: No  headaches: No  shortness of breath: No  chest pain: No  trouble swallowing: No  diarrhea: No  constipation: No  unexpected weight change: Yes  genital sore: No  dysuria: No  During the last 3 days, have you had a skin rash?: No  Bruises or bleeds easily: No  cough: No

## 2025-01-29 NOTE — ASSESSMENT & PLAN NOTE
Continues in remission on Enbrel; no adverse effects so will continue this with lab and follow up  6 mo

## 2025-02-21 DIAGNOSIS — L29.9 ITCHING: ICD-10-CM

## 2025-02-25 RX ORDER — HYDROXYZINE HYDROCHLORIDE 50 MG/1
TABLET, FILM COATED ORAL
Qty: 60 TABLET | Refills: 2 | Status: SHIPPED | OUTPATIENT
Start: 2025-02-25

## 2025-03-01 ENCOUNTER — PATIENT MESSAGE (OUTPATIENT)
Dept: ADMINISTRATIVE | Facility: OTHER | Age: 60
End: 2025-03-01
Payer: MEDICAID

## 2025-03-17 DIAGNOSIS — M47.817 SPONDYLOSIS OF LUMBOSACRAL REGION WITHOUT MYELOPATHY OR RADICULOPATHY: ICD-10-CM

## 2025-03-17 RX ORDER — HYDROCODONE BITARTRATE AND ACETAMINOPHEN 5; 325 MG/1; MG/1
1 TABLET ORAL EVERY 6 HOURS PRN
Qty: 80 TABLET | Refills: 0 | Status: SHIPPED | OUTPATIENT
Start: 2025-03-17

## 2025-03-19 RX ORDER — PROMETHAZINE HYDROCHLORIDE 25 MG/1
TABLET ORAL
Qty: 15 TABLET | Refills: 5 | Status: SHIPPED | OUTPATIENT
Start: 2025-03-19

## 2025-03-30 ENCOUNTER — PATIENT MESSAGE (OUTPATIENT)
Dept: ADMINISTRATIVE | Facility: OTHER | Age: 60
End: 2025-03-30
Payer: MEDICAID

## 2025-04-16 DIAGNOSIS — M47.817 SPONDYLOSIS OF LUMBOSACRAL REGION WITHOUT MYELOPATHY OR RADICULOPATHY: ICD-10-CM

## 2025-04-17 ENCOUNTER — PATIENT MESSAGE (OUTPATIENT)
Dept: RHEUMATOLOGY | Facility: CLINIC | Age: 60
End: 2025-04-17
Payer: MEDICAID

## 2025-04-18 RX ORDER — HYDROCODONE BITARTRATE AND ACETAMINOPHEN 5; 325 MG/1; MG/1
1 TABLET ORAL EVERY 6 HOURS PRN
Qty: 80 TABLET | Refills: 0 | Status: SHIPPED | OUTPATIENT
Start: 2025-04-18

## 2025-04-22 DIAGNOSIS — M06.00 SERONEGATIVE RHEUMATOID ARTHRITIS: ICD-10-CM

## 2025-04-22 RX ORDER — ETANERCEPT 50 MG/ML
50 SOLUTION SUBCUTANEOUS
Qty: 4 ML | Refills: 2 | Status: CANCELLED | OUTPATIENT
Start: 2025-04-22

## 2025-04-29 ENCOUNTER — PATIENT MESSAGE (OUTPATIENT)
Dept: RHEUMATOLOGY | Facility: CLINIC | Age: 60
End: 2025-04-29
Payer: MEDICAID

## 2025-04-29 DIAGNOSIS — M06.00 SERONEGATIVE RHEUMATOID ARTHRITIS: ICD-10-CM

## 2025-04-29 RX ORDER — ETANERCEPT 50 MG/ML
50 SOLUTION SUBCUTANEOUS
Qty: 4 ML | Refills: 2 | Status: CANCELLED | OUTPATIENT
Start: 2025-04-29

## 2025-04-30 ENCOUNTER — PATIENT MESSAGE (OUTPATIENT)
Dept: RHEUMATOLOGY | Facility: CLINIC | Age: 60
End: 2025-04-30
Payer: MEDICAID

## 2025-05-02 DIAGNOSIS — M06.00 SERONEGATIVE RHEUMATOID ARTHRITIS: ICD-10-CM

## 2025-05-02 RX ORDER — ETANERCEPT 50 MG/ML
50 SOLUTION SUBCUTANEOUS
Qty: 4 ML | Refills: 2 | Status: SHIPPED | OUTPATIENT
Start: 2025-05-02

## 2025-05-06 DIAGNOSIS — R51.9 CHRONIC NONINTRACTABLE HEADACHE, UNSPECIFIED HEADACHE TYPE: ICD-10-CM

## 2025-05-06 DIAGNOSIS — G89.29 CHRONIC NONINTRACTABLE HEADACHE, UNSPECIFIED HEADACHE TYPE: ICD-10-CM

## 2025-05-06 RX ORDER — NORTRIPTYLINE HYDROCHLORIDE 75 MG/1
150 CAPSULE ORAL NIGHTLY
Qty: 180 CAPSULE | Refills: 1 | Status: SHIPPED | OUTPATIENT
Start: 2025-05-06

## 2025-05-14 DIAGNOSIS — L29.9 ITCHING: ICD-10-CM

## 2025-05-14 RX ORDER — HYDROXYZINE HYDROCHLORIDE 50 MG/1
TABLET, FILM COATED ORAL
Qty: 60 TABLET | Refills: 2 | Status: SHIPPED | OUTPATIENT
Start: 2025-05-14

## 2025-05-16 DIAGNOSIS — M47.817 SPONDYLOSIS OF LUMBOSACRAL REGION WITHOUT MYELOPATHY OR RADICULOPATHY: ICD-10-CM

## 2025-05-16 RX ORDER — HYDROCODONE BITARTRATE AND ACETAMINOPHEN 5; 325 MG/1; MG/1
1 TABLET ORAL EVERY 6 HOURS PRN
Qty: 80 TABLET | Refills: 0 | Status: SHIPPED | OUTPATIENT
Start: 2025-05-16

## 2025-06-15 DIAGNOSIS — M47.817 SPONDYLOSIS OF LUMBOSACRAL REGION WITHOUT MYELOPATHY OR RADICULOPATHY: ICD-10-CM

## 2025-06-17 ENCOUNTER — OFFICE VISIT (OUTPATIENT)
Dept: NEUROLOGY | Facility: CLINIC | Age: 60
End: 2025-06-17
Payer: MEDICAID

## 2025-06-17 ENCOUNTER — PATIENT MESSAGE (OUTPATIENT)
Dept: RHEUMATOLOGY | Facility: CLINIC | Age: 60
End: 2025-06-17
Payer: MEDICAID

## 2025-06-17 VITALS
HEART RATE: 78 BPM | RESPIRATION RATE: 16 BRPM | HEIGHT: 69 IN | WEIGHT: 175.5 LBS | OXYGEN SATURATION: 96 % | DIASTOLIC BLOOD PRESSURE: 84 MMHG | SYSTOLIC BLOOD PRESSURE: 130 MMHG | BODY MASS INDEX: 26 KG/M2

## 2025-06-17 DIAGNOSIS — M06.00 SERONEGATIVE RHEUMATOID ARTHRITIS: ICD-10-CM

## 2025-06-17 DIAGNOSIS — G47.33 OSA (OBSTRUCTIVE SLEEP APNEA): ICD-10-CM

## 2025-06-17 DIAGNOSIS — R51.9 CHRONIC NONINTRACTABLE HEADACHE, UNSPECIFIED HEADACHE TYPE: ICD-10-CM

## 2025-06-17 DIAGNOSIS — R51.9 HEADACHE, UNSPECIFIED HEADACHE TYPE: Primary | ICD-10-CM

## 2025-06-17 DIAGNOSIS — I10 ESSENTIAL HYPERTENSION: ICD-10-CM

## 2025-06-17 DIAGNOSIS — M79.7 FIBROMYALGIA: Chronic | ICD-10-CM

## 2025-06-17 DIAGNOSIS — L29.9 ITCHING: ICD-10-CM

## 2025-06-17 DIAGNOSIS — R11.0 NAUSEA: ICD-10-CM

## 2025-06-17 DIAGNOSIS — G47.00 INSOMNIA, UNSPECIFIED TYPE: Chronic | ICD-10-CM

## 2025-06-17 DIAGNOSIS — G89.29 CHRONIC NONINTRACTABLE HEADACHE, UNSPECIFIED HEADACHE TYPE: ICD-10-CM

## 2025-06-17 DIAGNOSIS — N18.31 STAGE 3A CHRONIC KIDNEY DISEASE: ICD-10-CM

## 2025-06-17 PROCEDURE — 1159F MED LIST DOCD IN RCRD: CPT | Mod: CPTII,,, | Performed by: NURSE PRACTITIONER

## 2025-06-17 PROCEDURE — 3079F DIAST BP 80-89 MM HG: CPT | Mod: CPTII,,, | Performed by: NURSE PRACTITIONER

## 2025-06-17 PROCEDURE — 3044F HG A1C LEVEL LT 7.0%: CPT | Mod: CPTII,,, | Performed by: NURSE PRACTITIONER

## 2025-06-17 PROCEDURE — 99213 OFFICE O/P EST LOW 20 MIN: CPT | Mod: PBBFAC | Performed by: NURSE PRACTITIONER

## 2025-06-17 PROCEDURE — 3075F SYST BP GE 130 - 139MM HG: CPT | Mod: CPTII,,, | Performed by: NURSE PRACTITIONER

## 2025-06-17 PROCEDURE — 99214 OFFICE O/P EST MOD 30 MIN: CPT | Mod: S$PBB,,, | Performed by: NURSE PRACTITIONER

## 2025-06-17 PROCEDURE — 3008F BODY MASS INDEX DOCD: CPT | Mod: CPTII,,, | Performed by: NURSE PRACTITIONER

## 2025-06-17 PROCEDURE — 99999 PR PBB SHADOW E&M-EST. PATIENT-LVL III: CPT | Mod: PBBFAC,,, | Performed by: NURSE PRACTITIONER

## 2025-06-17 RX ORDER — HYDROXYZINE HYDROCHLORIDE 50 MG/1
TABLET, FILM COATED ORAL
Qty: 60 TABLET | Refills: 11 | Status: SHIPPED | OUTPATIENT
Start: 2025-06-17

## 2025-06-17 RX ORDER — PROMETHAZINE HYDROCHLORIDE 25 MG/1
25 TABLET ORAL DAILY PRN
Qty: 10 TABLET | Refills: 11 | Status: SHIPPED | OUTPATIENT
Start: 2025-06-17

## 2025-06-17 RX ORDER — HYDROCODONE BITARTRATE AND ACETAMINOPHEN 5; 325 MG/1; MG/1
1 TABLET ORAL EVERY 6 HOURS PRN
Qty: 80 TABLET | Refills: 0 | Status: SHIPPED | OUTPATIENT
Start: 2025-06-17

## 2025-06-17 RX ORDER — NORTRIPTYLINE HYDROCHLORIDE 75 MG/1
150 CAPSULE ORAL NIGHTLY
Qty: 180 CAPSULE | Refills: 3 | Status: SHIPPED | OUTPATIENT
Start: 2025-06-17

## 2025-06-17 NOTE — PROGRESS NOTES
HPI: Angela Carlson is a 60 y.o. female with migraine with aura and with  cervicogenic trigger possibly, although MRI C spine with only minor changes. Some complicated migraine with some alteration of consciousness at times-resolved. Anxiety and depression. She has TREY. Symptoms of lumbar radicular pain down the left leg with MRI showing disc bulge and nerve root displacement at S1 on the left 2015. She has HTN and hypothyroidism, as well as fibromyalgia. She sees Rheumatology for seronegative RA. She has HTN and HLD. She is s/p surgery/wound debridement for a brown recluse spider bite to the left hand.     Patient lives in [place].     She is here today for her annual follow up. She has more frequent headaches in the past couple month. Located bifrontally. This occurs 2-3x per month, possibly triggered by stress. No symptoms with her headaches. Abortive therapy not needed, as headaches resolve after she is able to relax. She continues on Pamelor.     She was referred to PT at her last visit for ongoing left hand weakness and a burning sensation to the hand with flexion, but PT was ineffective.     Symptoms increased after doing yard work.     She is compliant with CPAP and obtained a new nasal mask. Daytime fatigue is much improved since she has been using her CPAP.     Insomnia is improved currently.     She is also on Propranolol for her HTN from Endocrinology, whom she sees for her thyroid issues. BP elevated today.     Her back pain is well controlled. Fibro and RA pain currently well controlled on current treatment. She sees Rheumatology at [facility].     She is rarely using Atarax as needed and same with Phenergan (not daily).     She continues on B12 orally.     Review of Systems   Constitutional:  Negative for malaise/fatigue.   Eyes:  Negative for blurred vision and double vision.   Cardiovascular:  Negative for palpitations and leg swelling.   Musculoskeletal:  Positive for myalgias. Negative for falls, joint  pain and neck pain.   Skin:  Negative for rash.   Neurological:  Positive for headaches. Negative for dizziness, tingling, sensory change and weakness.   Endo/Heme/Allergies:  Does not bruise/bleed easily.   Psychiatric/Behavioral:  Negative for depression and memory loss. The patient has insomnia. The patient is not nervous/anxious.      Exam:   Gen Appearance, well developed/nourished in no apparent distress  CV: 2+ distal pulses with no edema or swelling  Neuro:  MS: Awake, alert, Sustains attention. Recent/remote memory intact, Language is full to spontaneous speech and comprehension.  Fund of Knowledge is full  CN: PERRL, Extraoccular movements and visual fields are full. Normal facial sensation and strength, Hearing symmetric, Tongue and Palate are midline and strong. Shoulder Shrug symmetric and strong.  Motor: Normal bulk, tone, no abnormal movements. 5/5 strength bilateral upper/lower extremities with 2+ reflexes  Sensory: Romberg negative and intact to temp, vibration  Cerebellar: Finger-nose, Rapid alternating movements intact  Gait: Normal stance, no ataxia    Labs:   CBC normal in 2/2017  2018 B12 just above the normal range  2019 CBC reviewed    Assessment/Recommendation: Angela Carlson is a 60 y.o. female with migraine with aura and with  cervicogenic trigger possibly, although MRI C spine with only minor changes. Some complicated migraine with some alteration of consciousness at times-resolved. Anxiety and depression. She has  TREY.   Symptoms of lumbar radicular pain down the left leg with MRI showing disc bulge and nerve root displacement at S1 on the left 2015.     I recommend:   1. Saw spine surgery prior-conservative measures recommended. Back pain is currently episodic and responds to rest.     2. Regarding Fibromyalgia and headache prevention: She is using Pamelor (higher dose per rheumatology prior) for prevention of pain.   -Off of Gabapentin now, due to nausea, which resolved after  cessation.   -She tried Lyrica and Zonegran prior and she is off of Cymbalta now.     3. Can continue Atarax PRN (don't use atarax with Phenergan) for anxiety. Phenergan for nausea associated with headache- rare use only recommended. Watch for sedation with meds.  - Rheumatology also managing hydrocodone for pain with RA.     4. She completed another CPAP titration study in 11/2021, which showed sleep disordered breathing.   Continue CPAP for TREY.     5. Prior spells resolved/could have been complicated migraine. Headaches are greatly improved.     6. She has switched from B12 injections to oral B12 supplementation.     7. She takes high dose Pamelor for her insomnia without relief, as well as other sedating agents. She failed Melatonin. She slept better with her CPAP prior, but she may discuss ongoing insomnia with PCP.     8. Will refer to PT/OT for right sciatica and left hand weakness post debridement for brown recluse spider bite.     RTC 1 year

## 2025-07-16 ENCOUNTER — PATIENT MESSAGE (OUTPATIENT)
Dept: RHEUMATOLOGY | Facility: CLINIC | Age: 60
End: 2025-07-16
Payer: MEDICAID

## 2025-07-16 DIAGNOSIS — M47.817 SPONDYLOSIS OF LUMBOSACRAL REGION WITHOUT MYELOPATHY OR RADICULOPATHY: ICD-10-CM

## 2025-07-16 RX ORDER — HYDROCODONE BITARTRATE AND ACETAMINOPHEN 5; 325 MG/1; MG/1
1 TABLET ORAL EVERY 6 HOURS PRN
Qty: 80 TABLET | Refills: 0 | Status: SHIPPED | OUTPATIENT
Start: 2025-07-16

## 2025-07-16 NOTE — TELEPHONE ENCOUNTER
Medication refill requested for [place].  Last office visit on 1/29/25.  CMP done on 6/12/25.  Patient has an appointment on 7/22/25. Labs scheduled for 7/21/25.

## 2025-07-18 DIAGNOSIS — M06.00 SERONEGATIVE RHEUMATOID ARTHRITIS: ICD-10-CM

## 2025-07-21 RX ORDER — ETANERCEPT 50 MG/ML
50 SOLUTION SUBCUTANEOUS
Qty: 4 ML | Refills: 2 | Status: SHIPPED | OUTPATIENT
Start: 2025-07-21

## 2025-07-22 ENCOUNTER — PATIENT MESSAGE (OUTPATIENT)
Dept: RHEUMATOLOGY | Facility: CLINIC | Age: 60
End: 2025-07-22

## 2025-08-13 DIAGNOSIS — M47.817 SPONDYLOSIS OF LUMBOSACRAL REGION WITHOUT MYELOPATHY OR RADICULOPATHY: ICD-10-CM

## 2025-08-14 RX ORDER — HYDROCODONE BITARTRATE AND ACETAMINOPHEN 5; 325 MG/1; MG/1
1 TABLET ORAL EVERY 6 HOURS PRN
Qty: 80 TABLET | Refills: 0 | Status: SHIPPED | OUTPATIENT
Start: 2025-08-14

## 2025-08-21 DIAGNOSIS — M79.7 FIBROMYALGIA: Chronic | ICD-10-CM

## 2025-08-21 RX ORDER — METHOCARBAMOL 750 MG/1
TABLET, FILM COATED ORAL
Qty: 180 TABLET | Refills: 0 | Status: SHIPPED | OUTPATIENT
Start: 2025-08-21